# Patient Record
Sex: FEMALE | Race: WHITE | NOT HISPANIC OR LATINO | Employment: OTHER | URBAN - METROPOLITAN AREA
[De-identification: names, ages, dates, MRNs, and addresses within clinical notes are randomized per-mention and may not be internally consistent; named-entity substitution may affect disease eponyms.]

---

## 2017-01-23 DIAGNOSIS — F41.9 ANXIETY: ICD-10-CM

## 2017-01-23 RX ORDER — ALPRAZOLAM 0.5 MG/1
0.5 TABLET ORAL 2 TIMES DAILY
Qty: 60 TABLET | Refills: 0 | Status: SHIPPED | OUTPATIENT
Start: 2017-01-23 | End: 2017-07-05 | Stop reason: SDUPTHER

## 2017-03-11 ENCOUNTER — HOSPITAL ENCOUNTER (EMERGENCY)
Facility: HOSPITAL | Age: 62
Discharge: HOME | End: 2017-03-11
Attending: EMERGENCY MEDICINE | Admitting: EMERGENCY MEDICINE

## 2017-03-11 VITALS
TEMPERATURE: 98.3 F | OXYGEN SATURATION: 99 % | BODY MASS INDEX: 24.45 KG/M2 | SYSTOLIC BLOOD PRESSURE: 130 MMHG | RESPIRATION RATE: 17 BRPM | HEART RATE: 80 BPM | HEIGHT: 63 IN | WEIGHT: 138 LBS | DIASTOLIC BLOOD PRESSURE: 71 MMHG

## 2017-03-11 DIAGNOSIS — M79.604 PAIN IN BOTH LOWER EXTREMITIES: Primary | ICD-10-CM

## 2017-03-11 DIAGNOSIS — M79.605 PAIN IN BOTH LOWER EXTREMITIES: Primary | ICD-10-CM

## 2017-03-11 LAB
ALBUMIN SERPL-MCNC: 4.1 G/DL (ref 3.5–5.2)
ALBUMIN/GLOB SERPL: 1.5 G/DL
ALP SERPL-CCNC: 46 U/L (ref 39–117)
ALT SERPL W P-5'-P-CCNC: 11 U/L (ref 1–33)
ANION GAP SERPL CALCULATED.3IONS-SCNC: 14.4 MMOL/L
AST SERPL-CCNC: 19 U/L (ref 1–32)
BACTERIA UR QL AUTO: ABNORMAL /HPF
BASOPHILS # BLD AUTO: 0.02 10*3/MM3 (ref 0–0.2)
BASOPHILS NFR BLD AUTO: 0.5 % (ref 0–1.5)
BILIRUB SERPL-MCNC: 0.2 MG/DL (ref 0.1–1.2)
BILIRUB UR QL STRIP: NEGATIVE
BUN BLD-MCNC: 15 MG/DL (ref 6–20)
BUN/CREAT SERPL: 17.9 (ref 7–25)
CALCIUM SPEC-SCNC: 8.9 MG/DL (ref 8.6–10.5)
CHLORIDE SERPL-SCNC: 100 MMOL/L (ref 98–107)
CK SERPL-CCNC: 46 U/L (ref 20–180)
CLARITY UR: CLEAR
CO2 SERPL-SCNC: 25.6 MMOL/L (ref 22–29)
COLOR UR: YELLOW
CREAT BLD-MCNC: 0.84 MG/DL (ref 0.57–1)
CRP SERPL-MCNC: 0.15 MG/DL (ref 0–0.5)
DEPRECATED RDW RBC AUTO: 43.5 FL (ref 37–54)
EOSINOPHIL # BLD AUTO: 0.06 10*3/MM3 (ref 0–0.7)
EOSINOPHIL NFR BLD AUTO: 1.5 % (ref 0.3–6.2)
ERYTHROCYTE [DISTWIDTH] IN BLOOD BY AUTOMATED COUNT: 13.8 % (ref 11.7–13)
ERYTHROCYTE [SEDIMENTATION RATE] IN BLOOD: 7 MM/HR (ref 0–30)
FLUAV AG NPH QL: NEGATIVE
FLUBV AG NPH QL IA: NEGATIVE
GFR SERPL CREATININE-BSD FRML MDRD: 69 ML/MIN/1.73
GLOBULIN UR ELPH-MCNC: 2.7 GM/DL
GLUCOSE BLD-MCNC: 100 MG/DL (ref 65–99)
GLUCOSE UR STRIP-MCNC: NEGATIVE MG/DL
HCT VFR BLD AUTO: 40.4 % (ref 35.6–45.5)
HGB BLD-MCNC: 13.1 G/DL (ref 11.9–15.5)
HGB UR QL STRIP.AUTO: ABNORMAL
HOLD SPECIMEN: NORMAL
HYALINE CASTS UR QL AUTO: ABNORMAL /LPF
IMM GRANULOCYTES # BLD: 0 10*3/MM3 (ref 0–0.03)
IMM GRANULOCYTES NFR BLD: 0 % (ref 0–0.5)
KETONES UR QL STRIP: ABNORMAL
LEUKOCYTE ESTERASE UR QL STRIP.AUTO: NEGATIVE
LYMPHOCYTES # BLD AUTO: 1.36 10*3/MM3 (ref 0.9–4.8)
LYMPHOCYTES NFR BLD AUTO: 33 % (ref 19.6–45.3)
MAGNESIUM SERPL-MCNC: 2.1 MG/DL (ref 1.6–2.6)
MCH RBC QN AUTO: 27.8 PG (ref 26.9–32)
MCHC RBC AUTO-ENTMCNC: 32.4 G/DL (ref 32.4–36.3)
MCV RBC AUTO: 85.6 FL (ref 80.5–98.2)
MONOCYTES # BLD AUTO: 0.54 10*3/MM3 (ref 0.2–1.2)
MONOCYTES NFR BLD AUTO: 13.1 % (ref 5–12)
NEUTROPHILS # BLD AUTO: 2.14 10*3/MM3 (ref 1.9–8.1)
NEUTROPHILS NFR BLD AUTO: 51.9 % (ref 42.7–76)
NITRITE UR QL STRIP: NEGATIVE
PH UR STRIP.AUTO: 6 [PH] (ref 5–8)
PLATELET # BLD AUTO: 247 10*3/MM3 (ref 140–500)
PMV BLD AUTO: 10.6 FL (ref 6–12)
POTASSIUM BLD-SCNC: 3.5 MMOL/L (ref 3.5–5.2)
PROT SERPL-MCNC: 6.8 G/DL (ref 6–8.5)
PROT UR QL STRIP: NEGATIVE
RBC # BLD AUTO: 4.72 10*6/MM3 (ref 3.9–5.2)
RBC # UR: ABNORMAL /HPF
REF LAB TEST METHOD: ABNORMAL
SODIUM BLD-SCNC: 140 MMOL/L (ref 136–145)
SP GR UR STRIP: 1.02 (ref 1–1.03)
SQUAMOUS #/AREA URNS HPF: ABNORMAL /HPF
UROBILINOGEN UR QL STRIP: ABNORMAL
WBC NRBC COR # BLD: 4.12 10*3/MM3 (ref 4.5–10.7)
WBC UR QL AUTO: ABNORMAL /HPF
WHOLE BLOOD HOLD SPECIMEN: NORMAL

## 2017-03-11 PROCEDURE — 96361 HYDRATE IV INFUSION ADD-ON: CPT

## 2017-03-11 PROCEDURE — 82550 ASSAY OF CK (CPK): CPT | Performed by: PHYSICIAN ASSISTANT

## 2017-03-11 PROCEDURE — 36415 COLL VENOUS BLD VENIPUNCTURE: CPT | Performed by: EMERGENCY MEDICINE

## 2017-03-11 PROCEDURE — 83735 ASSAY OF MAGNESIUM: CPT | Performed by: PHYSICIAN ASSISTANT

## 2017-03-11 PROCEDURE — 25010000002 MORPHINE PER 10 MG: Performed by: EMERGENCY MEDICINE

## 2017-03-11 PROCEDURE — 96374 THER/PROPH/DIAG INJ IV PUSH: CPT

## 2017-03-11 PROCEDURE — 80053 COMPREHEN METABOLIC PANEL: CPT | Performed by: EMERGENCY MEDICINE

## 2017-03-11 PROCEDURE — 81001 URINALYSIS AUTO W/SCOPE: CPT | Performed by: PHYSICIAN ASSISTANT

## 2017-03-11 PROCEDURE — 86140 C-REACTIVE PROTEIN: CPT | Performed by: PHYSICIAN ASSISTANT

## 2017-03-11 PROCEDURE — 85025 COMPLETE CBC W/AUTO DIFF WBC: CPT | Performed by: EMERGENCY MEDICINE

## 2017-03-11 PROCEDURE — 87804 INFLUENZA ASSAY W/OPTIC: CPT | Performed by: PHYSICIAN ASSISTANT

## 2017-03-11 PROCEDURE — 85652 RBC SED RATE AUTOMATED: CPT | Performed by: PHYSICIAN ASSISTANT

## 2017-03-11 PROCEDURE — 25010000002 ONDANSETRON PER 1 MG: Performed by: PHYSICIAN ASSISTANT

## 2017-03-11 PROCEDURE — 99283 EMERGENCY DEPT VISIT LOW MDM: CPT

## 2017-03-11 PROCEDURE — 96375 TX/PRO/DX INJ NEW DRUG ADDON: CPT

## 2017-03-11 RX ORDER — ONDANSETRON 4 MG/1
4 TABLET, ORALLY DISINTEGRATING ORAL EVERY 8 HOURS PRN
Qty: 12 TABLET | Refills: 0 | Status: SHIPPED | OUTPATIENT
Start: 2017-03-11 | End: 2017-08-25

## 2017-03-11 RX ORDER — SODIUM CHLORIDE 0.9 % (FLUSH) 0.9 %
10 SYRINGE (ML) INJECTION AS NEEDED
Status: DISCONTINUED | OUTPATIENT
Start: 2017-03-11 | End: 2017-03-12 | Stop reason: HOSPADM

## 2017-03-11 RX ORDER — GABAPENTIN 300 MG/1
300 CAPSULE ORAL 3 TIMES DAILY
Qty: 45 CAPSULE | Refills: 0 | Status: SHIPPED | OUTPATIENT
Start: 2017-03-11 | End: 2017-08-25

## 2017-03-11 RX ORDER — ONDANSETRON 2 MG/ML
4 INJECTION INTRAMUSCULAR; INTRAVENOUS ONCE
Status: COMPLETED | OUTPATIENT
Start: 2017-03-11 | End: 2017-03-11

## 2017-03-11 RX ORDER — OXYCODONE HYDROCHLORIDE AND ACETAMINOPHEN 5; 325 MG/1; MG/1
1 TABLET ORAL ONCE
Status: COMPLETED | OUTPATIENT
Start: 2017-03-11 | End: 2017-03-11

## 2017-03-11 RX ADMIN — MORPHINE SULFATE 4 MG: 4 INJECTION, SOLUTION INTRAMUSCULAR; INTRAVENOUS at 18:50

## 2017-03-11 RX ADMIN — SODIUM CHLORIDE 500 ML: 9 INJECTION, SOLUTION INTRAVENOUS at 18:53

## 2017-03-11 RX ADMIN — ONDANSETRON 4 MG: 2 INJECTION INTRAMUSCULAR; INTRAVENOUS at 18:48

## 2017-03-11 RX ADMIN — OXYCODONE HYDROCHLORIDE AND ACETAMINOPHEN 1 TABLET: 5; 325 TABLET ORAL at 21:41

## 2017-03-11 NOTE — ED PROVIDER NOTES
EMERGENCY DEPARTMENT ENCOUNTER    CHIEF COMPLAINT  Chief Complaint: Bilateral leg pain from the knees down  History given by: Patient  History limited by: Nothing  Room Number: 01/01  PMD: Pat Novak MD      HPI:  Pt is a 61 y.o. female who presents complaining of bilateral leg pain from the knees down onset yesterday. The pt states this pain feels different than her chronic back pain. The pt describes the pain as an ache in the joints. The pt states that nothing makes the pain better or worse. Pt reports diarrhea, nausea, and vomiting onset 3 days ago. The pt states the diarrhea, nausea, and vomiting has resolved but the leg pain started after the diarrhea, nausea, and vomiting. Pt denies fever, HA, cough, and sore throat.    Duration: Since yesterday  Onset: Gradaul  Timing: Constant  Location: Bilateral legs from the knees down  Radiation: None  Quality: Aching pain  Intensity/Severity: Moderate  Progression: Unchanged  Associated Symptoms: Diarrhea, nausea, and vomiting onset 3 days ago that have now resolved  Aggravating Factors: Nothing  Alleviating Factors: Nothing  Previous Episodes: None  Treatment before arrival: Nothing    PAST MEDICAL HISTORY  Active Ambulatory Problems     Diagnosis Date Noted   • Pain in extremity 02/10/2016   • Hypokalemia 02/10/2016   • Gastroparesis 02/10/2016   • Dizziness 02/10/2016   • Anxiety 02/10/2016   • Degeneration of lumbar intervertebral disc 07/29/2016   • Annular tear of lumbar disc 07/29/2016     Resolved Ambulatory Problems     Diagnosis Date Noted   • No Resolved Ambulatory Problems     Past Medical History   Diagnosis Date   • Bladder incontinence    • Spinal headache        PAST SURGICAL HISTORY  Past Surgical History   Procedure Laterality Date   • Hysterectomy     • Breast biopsy     • Oophorectomy     • Colonoscopy  2015 JAN   • Bladder sling modified, anterior and posterior vaginal repair  2007,2011   • Laparoscopic cholecystectomy  2009   •  Endoscopy  2015   • Colonoscopy N/A 5/6/2016     Procedure: COLONOSCOPY with cold polypectomies;  Surgeon: Gustavo Guerrero MD;  Location: [facility] ENDOSCOPY;  Service:        FAMILY HISTORY  Family History   Problem Relation Age of Onset   • Diabetes Mother    • Hypertension Mother    • COPD Mother    • COPD Father        SOCIAL HISTORY  Social History     Social History   • Marital status:      Spouse name: N/A   • Number of children: 2   • Years of education: N/A     Occupational History   • Not on file.     Social History Main Topics   • Smoking status: Former Smoker     Quit date: 4/6/2011   • Smokeless tobacco: Never Used   • Alcohol use No   • Drug use: No   • Sexual activity: Not on file     Other Topics Concern   • Not on file     Social History Narrative       ALLERGIES  Codeine and Prednisone    REVIEW OF SYSTEMS  Review of Systems   Constitutional: Negative for chills and fever.   HENT: Negative for sore throat and trouble swallowing.    Eyes: Negative for visual disturbance.   Respiratory: Negative for cough and shortness of breath.    Cardiovascular: Negative for chest pain, palpitations and leg swelling.   Gastrointestinal: Positive for diarrhea (Onset 3 days ago with symptoms being resolved), nausea (Onset 3 days ago with symptoms being resolved) and vomiting (Onset 3 days ago with symptoms being resolved). Negative for abdominal pain.   Endocrine: Negative.    Genitourinary: Negative for decreased urine volume, dysuria and frequency.   Musculoskeletal: Negative for neck pain.        Bilateral leg pain from the knee down   Skin: Negative for rash.   Allergic/Immunologic: Negative.    Neurological: Negative for syncope, weakness, numbness and headaches.   Hematological: Negative.    Psychiatric/Behavioral: Negative.    All other systems reviewed and are negative.      PHYSICAL EXAM  ED Triage Vitals   Temp Heart Rate Resp BP SpO2   03/11/17 1641 03/11/17 1641 03/11/17 1641 03/11/17 1645 03/11/17 1641    97.6 °F (36.4 °C) 106 18 146/80 99 %      Temp src Heart Rate Source Patient Position BP Location FiO2 (%)   03/11/17 1641 -- 03/11/17 1645 03/11/17 1645 --   Tympanic  Sitting Right arm        Physical Exam   Constitutional: She is oriented to person, place, and time and well-developed, well-nourished, and in no distress.   HENT:   Head: Normocephalic and atraumatic.   Mouth/Throat: Oropharynx is clear and moist. No oropharyngeal exudate or posterior oropharyngeal erythema.   Eyes: EOM are normal. Pupils are equal, round, and reactive to light.   Neck: Normal range of motion.   Cardiovascular: Normal rate, regular rhythm and normal heart sounds.    No murmur heard.  Pulses:       Posterior tibial pulses are 2+ on the right side, and 2+ on the left side.   Pulmonary/Chest: Effort normal and breath sounds normal. No respiratory distress.   Abdominal: Soft. There is no tenderness.   Femoral pulses equal bilaterally.   Musculoskeletal: Normal range of motion. She exhibits no edema or tenderness.   The pt has no joint tenderness, warmth or erythema. The pt has no calf tenderness.  Good pedal pulses bilaterally.   Lymphadenopathy:     She has no cervical adenopathy.   Neurological: She is alert and oriented to person, place, and time. She has normal sensation and normal strength.   Skin: Skin is warm and dry. No rash noted.   Psychiatric: Affect normal.   Nursing note and vitals reviewed.      LAB RESULTS  Lab Results (last 24 hours)     Procedure Component Value Units Date/Time    CBC & Differential [[id number]] Collected:  03/11/17 1702    Specimen:  Blood Updated:  03/11/17 1716    Narrative:       The following orders were created for panel order CBC & Differential.  Procedure                               Abnormality         Status                     ---------                               -----------         ------                     CBC Auto Differential[[id number]]         Abnormal            Final result                  Please view results for these tests on the individual orders.    Comprehensive Metabolic Panel [[id number]]  (Abnormal) Collected:  03/11/17 1702    Specimen:  Blood Updated:  03/11/17 1741     Glucose 100 (H) mg/dL      BUN 15 mg/dL      Creatinine 0.84 mg/dL      Sodium 140 mmol/L      Potassium 3.5 mmol/L      Chloride 100 mmol/L      CO2 25.6 mmol/L      Calcium 8.9 mg/dL      Total Protein 6.8 g/dL      Albumin 4.10 g/dL      ALT (SGPT) 11 U/L      AST (SGOT) 19 U/L      Alkaline Phosphatase 46 U/L      Total Bilirubin 0.2 mg/dL      eGFR Non African Amer 69 mL/min/1.73      Globulin 2.7 gm/dL      A/G Ratio 1.5 g/dL      BUN/Creatinine Ratio 17.9      Anion Gap 14.4 mmol/L     CBC Auto Differential [[id number]]  (Abnormal) Collected:  03/11/17 1702    Specimen:  Blood Updated:  03/11/17 1716     WBC 4.12 (L) 10*3/mm3      RBC 4.72 10*6/mm3      Hemoglobin 13.1 g/dL      Hematocrit 40.4 %      MCV 85.6 fL      MCH 27.8 pg      MCHC 32.4 g/dL      RDW 13.8 (H) %      RDW-SD 43.5 fl      MPV 10.6 fL      Platelets 247 10*3/mm3      Neutrophil % 51.9 %      Lymphocyte % 33.0 %      Monocyte % 13.1 (H) %      Eosinophil % 1.5 %      Basophil % 0.5 %      Immature Grans % 0.0 %      Neutrophils, Absolute 2.14 10*3/mm3      Lymphocytes, Absolute 1.36 10*3/mm3      Monocytes, Absolute 0.54 10*3/mm3      Eosinophils, Absolute 0.06 10*3/mm3      Basophils, Absolute 0.02 10*3/mm3      Immature Grans, Absolute 0.00 10*3/mm3     CK [[id number]]  (Normal) Collected:  03/11/17 1702    Specimen:  Blood Updated:  03/11/17 1849     Creatine Kinase 46 U/L     C-reactive Protein [[id number]]  (Normal) Collected:  03/11/17 1702    Specimen:  Blood Updated:  03/11/17 1849     C-Reactive Protein 0.15 mg/dL     Sedimentation Rate [[id number]]  (Normal) Collected:  03/11/17 1702    Specimen:  Blood Updated:  03/11/17 1905     Sed Rate 7 mm/hr     Magnesium [[id number]]  (Normal) Collected:  03/11/17 1702    Specimen:  Blood Updated:   03/11/17 1849     Magnesium 2.1 mg/dL     Influenza Antigen [[id number]]  (Normal) Collected:  03/11/17 1847    Specimen:  Swab from Nasopharynx Updated:  03/11/17 1923     Influenza A Ag, EIA Negative      Influenza B Ag, EIA Negative     Urinalysis With / Culture If Indicated [[id number]]  (Abnormal) Collected:  03/11/17 2028    Specimen:  Urine from Urine, Clean Catch Updated:  03/11/17 2051     Color, UA Yellow      Appearance, UA Clear      pH, UA 6.0      Specific Gravity, UA 1.016      Glucose, UA Negative      Ketones, UA 40 mg/dL (2+) (A)      Bilirubin, UA Negative      Blood, UA Small (1+) (A)      Protein, UA Negative      Leuk Esterase, UA Negative      Nitrite, UA Negative      Urobilinogen, UA 1.0 E.U./dL     Urinalysis, Microscopic Only [[id number]]  (Abnormal) Collected:  03/11/17 2028    Specimen:  Urine from Urine, Clean Catch Updated:  03/11/17 2051     RBC, UA 3-5 (A) /HPF      WBC, UA 0-2 /HPF      Bacteria, UA None Seen /HPF      Squamous Epithelial Cells, UA 0-2 /HPF      Hyaline Casts, UA 3-6 /LPF      Methodology Automated Microscopy           I ordered the above labs and reviewed the results    RADIOLOGY  No orders to display        I ordered the above noted radiological studies. Interpreted by radiologist. Reviewed by me in PACS.       PROCEDURES  Procedures      PROGRESS AND CONSULTS  ED Course     1645  Labs ordered for further evaluation.    1832  Labs ordered for further evaluation. IVFs, Zofran, and Morphine ordered.    2121  BP- 157/74 HR- 83 Temp- 97.6 °F (36.4 °C) (Tympanic) O2 sat- 97%    Rechecked pt, her pain has not improved after receiving the Morphine. Discussed the negative lab results with the pt. Discussed the possible causes of the pt's leg pain. Discussed the plan to discharge the pt with the plan to get an out patient US of her LE's. The pt understands and agrees with the plan.    2124  Percocet ordered.      MEDICAL DECISION MAKING  Results were reviewed/discussed with the  patient and they were also made aware of online access. Pt also made aware that some labs, such as cultures, will not be resulted during ER visit and follow up with PMD is necessary.     MDM  Number of Diagnoses or Management Options     Amount and/or Complexity of Data Reviewed  Clinical lab tests: ordered and reviewed  Review and summarize past medical records: yes (The pt was seen by Dr. Nix on 11-21-16 and was diagnosed with degeneration of the lumbar intervertebral disc.)    Patient Progress  Patient progress: stable         DIAGNOSIS  Final diagnoses:   Pain in both lower extremities       DISPOSITION  DISCHARGE    Patient discharged in stable condition.    Reviewed implications of results, diagnosis, meds, responsibility to follow up, warning signs and symptoms of possible worsening, potential complications and reasons to return to ER.    Patient/Family voiced understanding of above instructions.    Discussed plan for discharge, as there is no emergent indication for admission.  Pt/family is agreeable and understands need for follow up and repeat testing.  Pt is aware that discharge does not mean that nothing is wrong but it indicates no emergency is present that requires admission and they must continue care with follow-up as given below or physician of their choice.     FOLLOW-UP  Pat Novak MD  [address]  [place] [address]  [phone]    Schedule an appointment as soon as possible for a visit in 3 days           Medication List      New Prescriptions          gabapentin 300 MG capsule   Commonly known as:  NEURONTIN   Take 1 capsule by mouth 3 (Three) Times a Day.       ondansetron ODT 4 MG disintegrating tablet   Commonly known as:  ZOFRAN ODT   Take 1 tablet by mouth Every 8 (Eight) Hours As Needed for Nausea.               Latest Documented Vital Signs:  As of 10:06 PM  BP- 130/71 HR- 80 Temp- 98.3 °F (36.8 °C) (Oral) O2 sat- 99%    --  Documentation assistance  provided by ken Benavides for Virgilio Cordova.  Information recorded by the ken was done at my direction and has been verified and validated by me.     Joshua Benavides  03/11/17 2128       HENRY Nuñez  03/11/17 2134       HENRY Nuñez  03/11/17 2206

## 2017-03-11 NOTE — ED NOTES
Pt. reports BLE pain in both calf areas. Does not hurt with dorsiflexion.     Antonio Gleason RN  03/11/17 1112

## 2017-03-12 NOTE — ED NOTES
Discussed discharge instructions with patient, patient verbalized understanding, pt left with NAD.     Diana Cobian RN  03/11/17 6581

## 2017-03-12 NOTE — DISCHARGE INSTRUCTIONS
Home, rest, medicine as directed, follow up with PCP for recheck and further evaluation with outpatient arterial doppler.

## 2017-03-12 NOTE — ED PROVIDER NOTES
Pt is a 61 y.o. female who presents c/o bilateral lower leg pain, onset yesterday, with no known injury or trauma. Pt states there is nothing she can do to exacerbate or alleviate her pain. Pt states she has recently been ill with vomiting and diarrhea. Pt denies any vomiting or diarrhea today. Pt states she was seen by her chiropractor this morning but he did not feel that her sx were related to her back.     PE:  Pt appears uncomfortable  Vitals stable- /101  Heart RRR, lungs clear  2+ pedal pulses bilaterally with cap refill 3-4 sec  Abdomen soft, non tender  Lower extremities appear modeled in thigh but legs are warm to touch      Discussed with Pt nothing acute seen in labs. Plan to discharge for follow up doppler.     I supervised care provided by the midlevel provider Virgilio Cordova PA-C.    We have discussed this patient's history, physical exam, and treatment plan.   I have reviewed the note and personally saw and examined the patient and agree with the plan of care.    Documentation assistance provided by ken Simmons for Dr. Paulo MD.  Information recorded by the scribe was done at my direction and has been verified and validated by me.         Arielle Simmons  03/11/17 2002       Arnold Bates MD  03/11/17 6942

## 2017-03-13 ENCOUNTER — TELEPHONE (OUTPATIENT)
Dept: SOCIAL WORK | Facility: HOSPITAL | Age: 62
End: 2017-03-13

## 2017-03-14 ENCOUNTER — OFFICE VISIT (OUTPATIENT)
Dept: FAMILY MEDICINE CLINIC | Facility: CLINIC | Age: 62
End: 2017-03-14

## 2017-03-14 VITALS
SYSTOLIC BLOOD PRESSURE: 122 MMHG | WEIGHT: 136.5 LBS | HEART RATE: 83 BPM | HEIGHT: 63 IN | BODY MASS INDEX: 24.19 KG/M2 | OXYGEN SATURATION: 99 % | DIASTOLIC BLOOD PRESSURE: 80 MMHG | TEMPERATURE: 98.3 F

## 2017-03-14 DIAGNOSIS — M79.604 PAIN IN BOTH LOWER EXTREMITIES: Primary | ICD-10-CM

## 2017-03-14 DIAGNOSIS — M79.605 PAIN IN BOTH LOWER EXTREMITIES: Primary | ICD-10-CM

## 2017-03-14 PROCEDURE — 99212 OFFICE O/P EST SF 10 MIN: CPT | Performed by: FAMILY MEDICINE

## 2017-03-14 NOTE — PROGRESS NOTES
Subjective   Ritu Hanson is a 61 y.o. female. CC: leg pain    History of Present Illness   Ritu is a 61 year old female who comes in today because of pain in her legs.  This started on Saturday.  The pain was so severe that she went to the ER.  Evaluation was negative.    The pain is much better at this time.  She was able to work today.  No other symptoms besides the pain.  Blood pressure is good.        The following portions of the patient's history were reviewed and updated as appropriate: allergies, current medications, past medical history, past social history, past surgical history and problem list.    Review of Systems   Constitutional: Negative.  Negative for fatigue and unexpected weight change.   HENT: Negative.  Negative for congestion.    Respiratory: Negative.  Negative for cough, shortness of breath and wheezing.    Cardiovascular: Negative.  Negative for chest pain, palpitations and leg swelling.   Gastrointestinal: Negative.  Negative for abdominal pain, blood in stool, constipation, diarrhea, nausea and vomiting.   Genitourinary: Negative.  Negative for difficulty urinating, dysuria, frequency and hematuria.   Musculoskeletal: Negative.         ;pain in both lower legs   Skin: Negative.  Negative for rash.   Neurological: Negative.  Negative for dizziness, light-headedness, numbness and headaches.   Psychiatric/Behavioral: Negative for dysphoric mood and sleep disturbance. The patient is nervous/anxious.        Objective   Physical Exam   Constitutional: She is oriented to person, place, and time. She appears well-developed and well-nourished.   Cardiovascular: Normal rate, regular rhythm, normal heart sounds and intact distal pulses.    No murmur heard.  Pulmonary/Chest: Effort normal and breath sounds normal. No respiratory distress.   Musculoskeletal: She exhibits no edema.   Neurological: She is alert and oriented to person, place, and time.   Skin: Skin is warm and dry. No rash noted.    Psychiatric: She has a normal mood and affect. Her behavior is normal.   Nursing note and vitals reviewed.    Vitals:    03/14/17 1429   BP: 122/80   Pulse: 83   Temp: 98.3 °F (36.8 °C)   SpO2: 99%     Assessment/Plan   Problems Addressed this Visit     None      Visit Diagnoses     Pain in both lower extremities    -  Primary        No further evaluation at this time since the pain is better.  She is to let me know if she has recurrent problems.

## 2017-06-06 ENCOUNTER — TRANSCRIBE ORDERS (OUTPATIENT)
Dept: ADMINISTRATIVE | Facility: HOSPITAL | Age: 62
End: 2017-06-06

## 2017-06-06 DIAGNOSIS — Z12.31 VISIT FOR SCREENING MAMMOGRAM: Primary | ICD-10-CM

## 2017-06-12 ENCOUNTER — HOSPITAL ENCOUNTER (OUTPATIENT)
Dept: MAMMOGRAPHY | Facility: HOSPITAL | Age: 62
Discharge: HOME | End: 2017-06-12
Attending: OBSTETRICS & GYNECOLOGY | Admitting: OBSTETRICS & GYNECOLOGY

## 2017-06-12 DIAGNOSIS — Z12.31 VISIT FOR SCREENING MAMMOGRAM: ICD-10-CM

## 2017-06-12 PROCEDURE — 77063 BREAST TOMOSYNTHESIS BI: CPT

## 2017-06-12 PROCEDURE — G0202 SCR MAMMO BI INCL CAD: HCPCS

## 2017-07-05 DIAGNOSIS — F41.9 ANXIETY: ICD-10-CM

## 2017-07-05 RX ORDER — ALPRAZOLAM 0.5 MG/1
TABLET ORAL
Qty: 60 TABLET | Refills: 0 | OUTPATIENT
Start: 2017-07-05 | End: 2017-09-08 | Stop reason: SDUPTHER

## 2017-07-18 RX ORDER — PANTOPRAZOLE SODIUM 40 MG/1
40 TABLET, DELAYED RELEASE ORAL DAILY
Qty: 90 TABLET | Refills: 3 | OUTPATIENT
Start: 2017-07-18

## 2017-07-19 RX ORDER — PANTOPRAZOLE SODIUM 40 MG/1
40 TABLET, DELAYED RELEASE ORAL DAILY
Qty: 90 TABLET | Refills: 0 | Status: SHIPPED | OUTPATIENT
Start: 2017-07-19 | End: 2017-11-02 | Stop reason: SDUPTHER

## 2017-07-19 NOTE — TELEPHONE ENCOUNTER
----- Message from Aidan Dinero sent at 7/19/2017  1:29 PM EDT -----  Regarding: [facility]  Contact: [phone]  PT CALLED WAITING ON PENDING MEDICATION, PT HAS A FOLLOW UP WITH  ON SEP 13TH.

## 2017-07-19 NOTE — TELEPHONE ENCOUNTER
Pantoprazole 40 mg 1 tab po daily in the morning, #90, R0 called to [facility] @ [phone].    Call to pt to advise of above.  Verb understanding.

## 2017-08-24 ENCOUNTER — TELEPHONE (OUTPATIENT)
Dept: CARDIOLOGY | Facility: CLINIC | Age: 62
End: 2017-08-24

## 2017-08-24 NOTE — TELEPHONE ENCOUNTER
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 She has noticed a fluttering sensation in her chest for the last week. A co-worker did an EKG on her today and stated that the EKG was abnormal; the read out stated NSR with PACs. Her HR was 71.     She would like to be evaluated by a cardiologist. I scheduled her to see Dr. Her tomorrow 8/25 at 1020. She will bring the abnormal EKG with her.    Keke OLIVARES RN

## 2017-08-25 ENCOUNTER — HOSPITAL ENCOUNTER (OUTPATIENT)
Dept: CARDIOLOGY | Facility: HOSPITAL | Age: 62
Discharge: HOME | End: 2017-08-25
Attending: INTERNAL MEDICINE | Admitting: INTERNAL MEDICINE

## 2017-08-25 ENCOUNTER — OFFICE VISIT (OUTPATIENT)
Dept: CARDIOLOGY | Facility: CLINIC | Age: 62
End: 2017-08-25

## 2017-08-25 VITALS
BODY MASS INDEX: 23.57 KG/M2 | DIASTOLIC BLOOD PRESSURE: 100 MMHG | SYSTOLIC BLOOD PRESSURE: 160 MMHG | WEIGHT: 133 LBS | HEIGHT: 63 IN | HEART RATE: 93 BPM

## 2017-08-25 DIAGNOSIS — R00.2 PALPITATIONS: ICD-10-CM

## 2017-08-25 DIAGNOSIS — R00.2 PALPITATIONS: Primary | ICD-10-CM

## 2017-08-25 LAB
BH CV STRESS BP STAGE 1: NORMAL
BH CV STRESS BP STAGE 2: NORMAL
BH CV STRESS DURATION MIN STAGE 1: 3
BH CV STRESS DURATION MIN STAGE 2: 3
BH CV STRESS DURATION SEC STAGE 1: 0
BH CV STRESS DURATION SEC STAGE 2: 0
BH CV STRESS GRADE STAGE 1: 10
BH CV STRESS GRADE STAGE 2: 12
BH CV STRESS HR STAGE 1: 132
BH CV STRESS HR STAGE 2: 156
BH CV STRESS METS STAGE 1: 5
BH CV STRESS METS STAGE 2: 7.5
BH CV STRESS PROTOCOL 1: NORMAL
BH CV STRESS RECOVERY BP: NORMAL MMHG
BH CV STRESS RECOVERY HR: 94 BPM
BH CV STRESS SPEED STAGE 1: 1.7
BH CV STRESS SPEED STAGE 2: 2.5
BH CV STRESS STAGE 1: 1
BH CV STRESS STAGE 2: 2
MAXIMAL PREDICTED HEART RATE: 160 BPM
PERCENT MAX PREDICTED HR: 97.5 %
STRESS BASELINE BP: NORMAL MMHG
STRESS BASELINE HR: 93 BPM
STRESS PERCENT HR: 115 %
STRESS POST ESTIMATED WORKLOAD: 7.5 METS
STRESS POST EXERCISE DUR MIN: 6 MIN
STRESS POST EXERCISE DUR SEC: 0 SEC
STRESS POST PEAK BP: NORMAL MMHG
STRESS POST PEAK HR: 156 BPM
STRESS TARGET HR: 136 BPM

## 2017-08-25 PROCEDURE — 93017 CV STRESS TEST TRACING ONLY: CPT

## 2017-08-25 PROCEDURE — 93000 ELECTROCARDIOGRAM COMPLETE: CPT | Performed by: INTERNAL MEDICINE

## 2017-08-25 PROCEDURE — 93018 CV STRESS TEST I&R ONLY: CPT | Performed by: INTERNAL MEDICINE

## 2017-08-25 PROCEDURE — 99204 OFFICE O/P NEW MOD 45 MIN: CPT | Performed by: INTERNAL MEDICINE

## 2017-08-25 PROCEDURE — 93016 CV STRESS TEST SUPVJ ONLY: CPT | Performed by: INTERNAL MEDICINE

## 2017-08-25 NOTE — PROGRESS NOTES
Ritu Hanson  1955  Date of Office Visit: 08/25/2017  Encounter Provider: Colton Her MD  Place of Service: [facility] CARDIOLOGY      CHIEF COMPLAINT:  Palpitations  Left arm pain  Elevated blood pressure without diagnosis of hypertension    HISTORY OF PRESENT ILLNESS:   Dr. Novak,   I had the pleasure of seeing your patient Ritu Hanson in consultation today.  As you well know she is a very pleasant 62-year-old female with a medical history of previous tobacco abuse, gastroesophageal reflux disease, anxiety, who presents to me secondary to palpitations and left arm discomfort.  She states that over the past 2 weeks in duration she has noticed palpitations that come on typically daily.  They're moderate in intensity and last approximately 5 minutes in duration.  They're nonexertional and come on without any precipitating factor.  She does not have extensive caffeine or alcohol use.  She does state that she has underlying more stress than normal at work over the past couple weeks.  She denies any chest pain with exertion or increased palpitations with exertion.  She does report mild left arm discomfort that is dull and comes on at rest during the same period of time.  This also goes away within 5 minutes.  That this does not increase with movement or with activity.  She had an electrocardiogram done at her work at [employer] which was within normal limits.  Her EKG today shows a normal sinus rhythm with nonspecific ST-T wave abnormalities.  In addition, her blood pressure today is elevated, however she states that she is anxious.  When I look at her previous blood pressure measurements in your office they're within normal limits.    Review of Systems   Constitution: Negative for fever, weakness and malaise/fatigue.   HENT: Negative for nosebleeds and sore throat.    Eyes: Negative for blurred vision and double vision.   Cardiovascular: Negative for chest pain,  claudication, palpitations and syncope.   Respiratory: Negative for cough, shortness of breath and snoring.    Endocrine: Negative for cold intolerance, heat intolerance and polydipsia.   Skin: Negative for itching, poor wound healing and rash.   Musculoskeletal: Negative for joint pain, joint swelling, muscle weakness and myalgias.   Gastrointestinal: Negative for abdominal pain, melena, nausea and vomiting.   Neurological: Negative for light-headedness, loss of balance, seizures and vertigo.   Psychiatric/Behavioral: Negative for altered mental status and depression.          Past Medical History:   Diagnosis Date   • Anxiety    • Bladder incontinence    • Colon polyps    • Dizziness    • PAC (premature atrial contraction)    • Spinal headache     NO FAMILY HX   • Tachycardia        The following portions of the patient's history were reviewed and updated as appropriate: Social history , Family history and Surgical history     Current Outpatient Prescriptions on File Prior to Visit   Medication Sig Dispense Refill   • ALPRAZolam (XANAX) 0.5 MG tablet take 1 tablet by mouth twice a day 60 tablet 0   • estradiol (MINIVELLE, VIVELLE-DOT) 0.1 MG/24HR patch Place on the skin.     • ibuprofen (ADVIL,MOTRIN) 600 MG tablet Take 1 tablet by mouth every 8 (eight) hours as needed for mild pain (1-3). 30 tablet 0   • pantoprazole (PROTONIX) 40 MG EC tablet Take 1 tablet by mouth daily in the morning. 90 tablet 0   • [DISCONTINUED] cyclobenzaprine (FLEXERIL) 10 MG tablet Take 1 tablet by mouth 3 (three) times a day as needed for muscle spasms. 21 tablet 0   • [DISCONTINUED] gabapentin (NEURONTIN) 300 MG capsule Take 1 capsule by mouth 3 (Three) Times a Day. 45 capsule 0   • [DISCONTINUED] ondansetron ODT (ZOFRAN ODT) 4 MG disintegrating tablet Take 1 tablet by mouth Every 8 (Eight) Hours As Needed for Nausea. 12 tablet 0     No current facility-administered medications on file prior to visit.        Allergies   Allergen  "Reactions   • Codeine    • Prednisone        Vitals:    08/25/17 1025 08/25/17 1039   BP: (!) 162/102 160/100   BP Location: Left arm Left arm   Pulse: 93    Weight: 133 lb (60.3 kg)    Height: 63\" (160 cm)      Physical Exam   Constitutional: She is oriented to person, place, and time. She appears well-developed and well-nourished.   HENT:   Head: Normocephalic and atraumatic.   Eyes: Conjunctivae and EOM are normal. No scleral icterus.   Neck: Normal range of motion. Neck supple. Normal carotid pulses, no hepatojugular reflux and no JVD present. Carotid bruit is not present. No tracheal deviation present. No thyromegaly present.   Cardiovascular: Normal rate and regular rhythm.  Exam reveals no gallop and no friction rub.    No murmur heard.  Pulses:       Carotid pulses are 2+ on the right side, and 2+ on the left side.       Radial pulses are 2+ on the right side, and 2+ on the left side.        Femoral pulses are 2+ on the right side, and 2+ on the left side.       Dorsalis pedis pulses are 2+ on the right side, and 2+ on the left side.        Posterior tibial pulses are 2+ on the right side, and 2+ on the left side.   Pulmonary/Chest: Breath sounds normal. No respiratory distress. She has no decreased breath sounds. She has no wheezes. She has no rhonchi. She has no rales. She exhibits no tenderness.   Abdominal: Soft. Bowel sounds are normal. She exhibits no distension. There is no tenderness. There is no rebound.   Musculoskeletal: She exhibits no edema or deformity.   Neurological: She is alert and oriented to person, place, and time. She has normal strength. No sensory deficit.   Skin: No rash noted. No erythema.   Psychiatric: She has a normal mood and affect. Her behavior is normal.     No components found for: CBC  No results found for: CMP  No components found for: LIPID  No results found for: BMP      ECG 12 Lead  Date/Time: 8/25/2017 11:04 AM  Performed by: BREE MONTEIRO  Authorized by: " BREE MONTEIRO   Comparison: compared with previous ECG from 8/24/2017  Similar to previous ECG  Rhythm: sinus rhythm  Rate: normal  ST Segments: ST segments normal  T Waves: T waves normal  QRS axis: normal  Clinical impression: normal ECG          DISCUSSION/SUMMARY  62-year-old female with no significant cardiac history who presents to me for evaluation of palpitations and occasional left arm discomfort.  The palpitations and arm discomfort do not appear to be exertional, and I do not think they're representative of ischemia or angina.  Her palpitations do seem to be frequent and I think that we should establish what the etiology of the palpitations actually is.    1.  Palpitations:      -48 hour Holter monitor has been ordered    -Treadmill stress today.    I will discuss results with her after we have them back.

## 2017-08-29 ENCOUNTER — HOSPITAL ENCOUNTER (OUTPATIENT)
Dept: CARDIOLOGY | Facility: HOSPITAL | Age: 62
Discharge: HOME | End: 2017-08-29
Attending: INTERNAL MEDICINE | Admitting: INTERNAL MEDICINE

## 2017-08-29 VITALS
DIASTOLIC BLOOD PRESSURE: 86 MMHG | BODY MASS INDEX: 23.57 KG/M2 | WEIGHT: 133 LBS | HEIGHT: 63 IN | HEART RATE: 82 BPM | SYSTOLIC BLOOD PRESSURE: 122 MMHG

## 2017-08-29 DIAGNOSIS — R00.2 PALPITATIONS: ICD-10-CM

## 2017-08-29 LAB
BH CV ECHO MEAS - ACS: 2.1 CM
BH CV ECHO MEAS - AO MAX PG: 4.5 MMHG
BH CV ECHO MEAS - AO ROOT AREA (BSA CORRECTED): 1.8
BH CV ECHO MEAS - AO ROOT AREA: 6.9 CM^2
BH CV ECHO MEAS - AO ROOT DIAM: 3 CM
BH CV ECHO MEAS - AO V2 MAX: 106.4 CM/SEC
BH CV ECHO MEAS - BSA(HAYCOCK): 1.6 M^2
BH CV ECHO MEAS - BSA: 1.6 M^2
BH CV ECHO MEAS - BZI_BMI: 23.6 KILOGRAMS/M^2
BH CV ECHO MEAS - BZI_METRIC_HEIGHT: 160 CM
BH CV ECHO MEAS - BZI_METRIC_WEIGHT: 60.3 KG
BH CV ECHO MEAS - CONTRAST EF 4CH: 78 ML/M^2
BH CV ECHO MEAS - EDV(MOD-SP4): 59 ML
BH CV ECHO MEAS - EDV(TEICH): 86.7 ML
BH CV ECHO MEAS - EF(CUBED): 71.5 %
BH CV ECHO MEAS - EF(MOD-SP4): 63 %
BH CV ECHO MEAS - EF(TEICH): 63.4 %
BH CV ECHO MEAS - ESV(MOD-SP4): 13 ML
BH CV ECHO MEAS - ESV(TEICH): 31.7 ML
BH CV ECHO MEAS - FS: 34.2 %
BH CV ECHO MEAS - IVS/LVPW: 1
BH CV ECHO MEAS - IVSD: 0.85 CM
BH CV ECHO MEAS - LAT PEAK E' VEL: 10 CM/SEC
BH CV ECHO MEAS - LV DIASTOLIC VOL/BSA (35-75): 36.3 ML/M^2
BH CV ECHO MEAS - LV MASS(C)D: 113.2 GRAMS
BH CV ECHO MEAS - LV MASS(C)DI: 69.6 GRAMS/M^2
BH CV ECHO MEAS - LV SYSTOLIC VOL/BSA (12-30): 8 ML/M^2
BH CV ECHO MEAS - LVIDD: 4.4 CM
BH CV ECHO MEAS - LVIDS: 2.9 CM
BH CV ECHO MEAS - LVLD AP4: 7 CM
BH CV ECHO MEAS - LVLS AP4: 6.1 CM
BH CV ECHO MEAS - LVPWD: 0.81 CM
BH CV ECHO MEAS - MED PEAK E' VEL: 9 CM/SEC
BH CV ECHO MEAS - MR MAX PG: 37 MMHG
BH CV ECHO MEAS - MR MAX VEL: 304.1 CM/SEC
BH CV ECHO MEAS - MV A DUR: 0.11 SEC
BH CV ECHO MEAS - MV A MAX VEL: 66.9 CM/SEC
BH CV ECHO MEAS - MV DEC SLOPE: 357.6 CM/SEC^2
BH CV ECHO MEAS - MV DEC TIME: 0.21 SEC
BH CV ECHO MEAS - MV E MAX VEL: 74.2 CM/SEC
BH CV ECHO MEAS - MV E/A: 1.1
BH CV ECHO MEAS - MV P1/2T MAX VEL: 72.8 CM/SEC
BH CV ECHO MEAS - MV P1/2T: 59.6 MSEC
BH CV ECHO MEAS - MVA P1/2T LCG: 3 CM^2
BH CV ECHO MEAS - MVA(P1/2T): 3.7 CM^2
BH CV ECHO MEAS - PULM A REVS DUR: 0.11 SEC
BH CV ECHO MEAS - PULM A REVS VEL: 39.8 CM/SEC
BH CV ECHO MEAS - PULM DIAS VEL: 33.5 CM/SEC
BH CV ECHO MEAS - PULM S/D: 1.6
BH CV ECHO MEAS - PULM SYS VEL: 52.4 CM/SEC
BH CV ECHO MEAS - SI(CUBED): 36.9 ML/M^2
BH CV ECHO MEAS - SI(MOD-SP4): 28.3 ML/M^2
BH CV ECHO MEAS - SI(TEICH): 33.8 ML/M^2
BH CV ECHO MEAS - SV(CUBED): 60.1 ML
BH CV ECHO MEAS - SV(MOD-SP4): 46 ML
BH CV ECHO MEAS - SV(TEICH): 55 ML
BH CV ECHO MEAS - TAPSE (>1.6): 2.3 CM2
BH CV STRESS BP STAGE 1: NORMAL
BH CV STRESS BP STAGE 2: NORMAL
BH CV STRESS DURATION MIN STAGE 1: 3
BH CV STRESS DURATION MIN STAGE 2: 3
BH CV STRESS DURATION SEC STAGE 1: 0
BH CV STRESS DURATION SEC STAGE 2: 0
BH CV STRESS ECHO POST STRESS EJECTION FRACTION EF: 77 %
BH CV STRESS GRADE STAGE 1: 10
BH CV STRESS GRADE STAGE 2: 12
BH CV STRESS HR STAGE 1: 131
BH CV STRESS HR STAGE 2: 150
BH CV STRESS METS STAGE 1: 5
BH CV STRESS METS STAGE 2: 7.5
BH CV STRESS PROTOCOL 1: NORMAL
BH CV STRESS SPEED STAGE 1: 1.7
BH CV STRESS SPEED STAGE 2: 2.5
BH CV STRESS STAGE 1: 1
BH CV STRESS STAGE 2: 2
BH CV XLRA - RV BASE: 2.7 CM
BH CV XLRA - TDI S': 10 CM/SEC
E/E' RATIO: 7.5
LEFT ATRIUM VOLUME INDEX: 15 ML/M2
LV EF 2D ECHO EST: 63 %
MAXIMAL PREDICTED HEART RATE: 160 BPM
PERCENT MAX PREDICTED HR: 93.75 %
STRESS BASELINE BP: NORMAL MMHG
STRESS BASELINE HR: 82 BPM
STRESS PERCENT HR: 110 %
STRESS POST ESTIMATED WORKLOAD: 7 METS
STRESS POST EXERCISE DUR MIN: 6 MIN
STRESS POST EXERCISE DUR SEC: 0 SEC
STRESS POST PEAK BP: NORMAL MMHG
STRESS POST PEAK HR: 150 BPM
STRESS TARGET HR: 136 BPM

## 2017-08-29 PROCEDURE — 93325 DOPPLER ECHO COLOR FLOW MAPG: CPT | Performed by: INTERNAL MEDICINE

## 2017-08-29 PROCEDURE — 93350 STRESS TTE ONLY: CPT | Performed by: INTERNAL MEDICINE

## 2017-08-29 PROCEDURE — 93016 CV STRESS TEST SUPVJ ONLY: CPT | Performed by: INTERNAL MEDICINE

## 2017-08-29 PROCEDURE — 93017 CV STRESS TEST TRACING ONLY: CPT

## 2017-08-29 PROCEDURE — 93350 STRESS TTE ONLY: CPT

## 2017-08-29 PROCEDURE — 93325 DOPPLER ECHO COLOR FLOW MAPG: CPT

## 2017-08-29 PROCEDURE — 93320 DOPPLER ECHO COMPLETE: CPT | Performed by: INTERNAL MEDICINE

## 2017-08-29 PROCEDURE — 93320 DOPPLER ECHO COMPLETE: CPT

## 2017-08-29 PROCEDURE — 93018 CV STRESS TEST I&R ONLY: CPT | Performed by: INTERNAL MEDICINE

## 2017-09-08 DIAGNOSIS — F41.9 ANXIETY: ICD-10-CM

## 2017-09-08 RX ORDER — ALPRAZOLAM 0.5 MG/1
TABLET ORAL
Qty: 60 TABLET | Refills: 0 | OUTPATIENT
Start: 2017-09-08 | End: 2017-11-02 | Stop reason: SDUPTHER

## 2017-09-13 ENCOUNTER — OFFICE VISIT (OUTPATIENT)
Dept: GASTROENTEROLOGY | Facility: CLINIC | Age: 62
End: 2017-09-13

## 2017-09-13 VITALS
BODY MASS INDEX: 23.96 KG/M2 | TEMPERATURE: 98.6 F | WEIGHT: 135.2 LBS | HEIGHT: 63 IN | SYSTOLIC BLOOD PRESSURE: 128 MMHG | DIASTOLIC BLOOD PRESSURE: 84 MMHG

## 2017-09-13 DIAGNOSIS — Z80.0 FH: COLON CANCER: ICD-10-CM

## 2017-09-13 DIAGNOSIS — K63.5 COLON POLYPS: ICD-10-CM

## 2017-09-13 DIAGNOSIS — K21.9 GASTROESOPHAGEAL REFLUX DISEASE, ESOPHAGITIS PRESENCE NOT SPECIFIED: Primary | ICD-10-CM

## 2017-09-13 PROCEDURE — 99213 OFFICE O/P EST LOW 20 MIN: CPT | Performed by: INTERNAL MEDICINE

## 2017-09-13 NOTE — PROGRESS NOTES
Chief Complaint   Patient presents with   • Heartburn       History of Present Illness: She is here to get a refill on the Protonix. She occasionall has epigastric bloating but she eats little meals and does OK. She saw Dr. Priest at . He was recommending Domperidone but because of potential cardiac side effects she decided against it. She is on Pantoprazole 40 mg/day. No nausea, vomiting or dysphagia. NO diarrhea or constipation. No rectal bleeding or melena. Weight stable.     Past Medical History:   Diagnosis Date   • Anxiety    • Bladder incontinence    • Colon polyps 01/2015    HP & TA   • Dizziness    • GERD (gastroesophageal reflux disease)    • PAC (premature atrial contraction)    • Spinal headache     NO FAMILY HX   • Tachycardia        Past Surgical History:   Procedure Laterality Date   • BLADDER SLING MODIFIED, ANTERIOR AND POSTERIOR VAGINAL REPAIR  2007,2011   • BREAST BIOPSY     • COLONOSCOPY  01/16/2015    TA w/low grade dysplasia HP x 3   • COLONOSCOPY N/A 5/6/2016    Procedure: COLONOSCOPY with cold polypectomies;  Surgeon: Gustavo Guerrero MD;  Location: [facility] ENDOSCOPY;  Service: IH, HP glands, HP Polyp   • ENDOSCOPY  01/16/2015    erythematous mucosa in stomach   • HYSTERECTOMY     • LAPAROSCOPIC CHOLECYSTECTOMY  2009   • OOPHORECTOMY     • UPPER GASTROINTESTINAL ENDOSCOPY  11/26/2012    erythematous mucosa in stomach   • UPPER GASTROINTESTINAL ENDOSCOPY  06/11/2012    monoilial esophagitis, erythema         Current Outpatient Prescriptions:   •  ALPRAZolam (XANAX) 0.5 MG tablet, take 1 tablet by mouth twice a day if needed, Disp: 60 tablet, Rfl: 0  •  estradiol (MINIVELLE, VIVELLE-DOT) 0.1 MG/24HR patch, Place on the skin., Disp: , Rfl:   •  pantoprazole (PROTONIX) 40 MG EC tablet, Take 1 tablet by mouth daily in the morning., Disp: 90 tablet, Rfl: 0  •  ibuprofen (ADVIL,MOTRIN) 600 MG tablet, Take 1 tablet by mouth every 8 (eight) hours as needed for mild pain (1-3)., Disp: 30 tablet, Rfl:  0    Allergies   Allergen Reactions   • Codeine    • Prednisone        Family History   Problem Relation Age of Onset   • Diabetes Mother    • Hypertension Mother    • COPD Mother    • COPD Father    • Colon cancer Maternal Grandfather        Social History     Social History   • Marital status:      Spouse name: N/A   • Number of children: 2   • Years of education: N/A     Occupational History   • Not on file.     Social History Main Topics   • Smoking status: Former Smoker     Packs/day: 0.50     Quit date: 4/6/2011   • Smokeless tobacco: Never Used   • Alcohol use No   • Drug use: No   • Sexual activity: Not on file     Other Topics Concern   • Not on file     Social History Narrative       Review of Systems   All other systems reviewed and are negative.      Vitals:    09/13/17 1422   BP: 128/84   Temp: 98.6 °F (37 °C)       Physical Exam   Constitutional: She is oriented to person, place, and time. She appears well-developed and well-nourished. No distress.   HENT:   Head: Normocephalic and atraumatic. Hair is normal.   Right Ear: Hearing, tympanic membrane, external ear and ear canal normal. No drainage. No decreased hearing is noted.   Left Ear: Hearing, tympanic membrane, external ear and ear canal normal. No decreased hearing is noted.   Nose: No nasal deformity.   Mouth/Throat: Oropharynx is clear and moist.   Eyes: Conjunctivae, EOM and lids are normal. Pupils are equal, round, and reactive to light. Right eye exhibits no discharge. Left eye exhibits no discharge.   Neck: Normal range of motion. Neck supple. No JVD present. No tracheal deviation present. No thyromegaly present.   Cardiovascular: Normal rate, regular rhythm, normal heart sounds, intact distal pulses and normal pulses.  Exam reveals no gallop and no friction rub.    No murmur heard.  Pulmonary/Chest: Effort normal and breath sounds normal. No respiratory distress. She has no wheezes. She has no rales. She exhibits no tenderness.    Abdominal: Soft. Bowel sounds are normal. She exhibits no distension and no mass. There is no tenderness. There is no rebound and no guarding. No hernia.   Genitourinary: Rectal exam shows guaiac negative stool.   Genitourinary Comments: Normal anorectal exam.   Musculoskeletal: Normal range of motion. She exhibits no edema, tenderness or deformity.   Lymphadenopathy:     She has no cervical adenopathy.   Neurological: She is alert and oriented to person, place, and time. She has normal reflexes. She displays normal reflexes. No cranial nerve deficit. She exhibits normal muscle tone. Coordination normal.   Skin: Skin is warm and dry. No rash noted. She is not diaphoretic. No erythema.   Psychiatric: She has a normal mood and affect. Her behavior is normal. Judgment and thought content normal.   Vitals reviewed.      Ritu was seen today for heartburn.    Diagnoses and all orders for this visit:    Gastroesophageal reflux disease, esophagitis presence not specified    Colon polyps    FH: colon cancer       Assessment:  1) h/o colonic adenomas  2) FH (PGF and MGF) colon cancer.  3) h/o gastroparesis - She has seen Dr. Priest    Recommendations:  1) We discussed the pros and cons of chronic PPI use. Try to decrease of stop the PPI  2) f/u one year.       Return in about 1 year (around 9/13/2018).    Gustavo Guerrero MD  9/13/2017

## 2017-10-12 DIAGNOSIS — F41.9 ANXIETY: ICD-10-CM

## 2017-10-13 RX ORDER — ALPRAZOLAM 0.5 MG/1
TABLET ORAL
Qty: 60 TABLET | Refills: 0 | OUTPATIENT
Start: 2017-10-13

## 2017-11-02 ENCOUNTER — OFFICE VISIT (OUTPATIENT)
Dept: FAMILY MEDICINE CLINIC | Facility: CLINIC | Age: 62
End: 2017-11-02

## 2017-11-02 VITALS
DIASTOLIC BLOOD PRESSURE: 80 MMHG | HEIGHT: 63 IN | BODY MASS INDEX: 23.94 KG/M2 | TEMPERATURE: 98.2 F | SYSTOLIC BLOOD PRESSURE: 120 MMHG | HEART RATE: 86 BPM | WEIGHT: 135.1 LBS | OXYGEN SATURATION: 99 %

## 2017-11-02 DIAGNOSIS — F41.9 ANXIETY: ICD-10-CM

## 2017-11-02 DIAGNOSIS — Z23 ENCOUNTER FOR IMMUNIZATION: Primary | ICD-10-CM

## 2017-11-02 PROCEDURE — 99213 OFFICE O/P EST LOW 20 MIN: CPT | Performed by: FAMILY MEDICINE

## 2017-11-02 PROCEDURE — 90715 TDAP VACCINE 7 YRS/> IM: CPT | Performed by: FAMILY MEDICINE

## 2017-11-02 PROCEDURE — 90471 IMMUNIZATION ADMIN: CPT | Performed by: FAMILY MEDICINE

## 2017-11-02 RX ORDER — ALPRAZOLAM 0.5 MG/1
0.5 TABLET ORAL 2 TIMES DAILY PRN
Qty: 60 TABLET | Refills: 1 | Status: SHIPPED | OUTPATIENT
Start: 2017-11-02 | End: 2018-03-21 | Stop reason: SDUPTHER

## 2017-11-02 NOTE — PROGRESS NOTES
Subjective   Ritu Hanson is a 62 y.o. female. CC: anxiety    History of Present Illness     Ritu is a 62 year old female who comes in today for anxiety.  She is on Xanax.  We hd previously discussed cutting back on this and getting her off of it.  She is requesting to stay on it for the present time.  She is under a lot of stress right now.  Her father  earlier this year.  She and her  are currently .  They are working things through.    She is due for a tetanus shot and will get that today.      The following portions of the patient's history were reviewed and updated as appropriate: allergies, current medications, past medical history, past social history, past surgical history and problem list.    Review of Systems   Constitutional: Negative.  Negative for fatigue and unexpected weight change.   HENT: Negative.  Negative for congestion.    Respiratory: Negative.  Negative for cough, shortness of breath and wheezing.    Cardiovascular: Negative.  Negative for chest pain, palpitations and leg swelling.   Gastrointestinal: Negative.  Negative for diarrhea and nausea.   Genitourinary: Negative.  Negative for difficulty urinating and hematuria.   Musculoskeletal: Negative.  Negative for arthralgias and back pain.   Skin: Negative.  Negative for rash.   Neurological: Negative.  Negative for dizziness, light-headedness and headaches.   Psychiatric/Behavioral: Negative for dysphoric mood and sleep disturbance. The patient is nervous/anxious.        Objective   Physical Exam   Constitutional: She is oriented to person, place, and time. She appears well-developed and well-nourished.   Neck: Normal range of motion. Neck supple.   Cardiovascular: Normal rate, regular rhythm and normal heart sounds.    Pulmonary/Chest: Effort normal and breath sounds normal.   Neurological: She is alert and oriented to person, place, and time.   Skin: Skin is warm and dry. No rash noted.   Psychiatric: She has a  normal mood and affect. Her behavior is normal.   Nursing note and vitals reviewed.    Vitals:    11/02/17 1333   BP: 120/80   Pulse: 86   Temp: 98.2 °F (36.8 °C)   SpO2: 99%     Assessment/Plan   Problems Addressed this Visit        Other    Anxiety    Relevant Medications    ALPRAZolam (XANAX) 0.5 MG tablet      Other Visit Diagnoses     Encounter for immunization    -  Primary    Relevant Orders    Tdap Vaccine Greater Than or Equal To 8yo IM (Completed)      After obtaining informed consent, the immunization is given by Aura BUSH    The patient has read and signed the [facility] Controlled Substance Contract.  I will continue to see patient for regular follow up appointments.  They are well controlled on their medication.  EULALIO is updated every 3 months. The patient is aware of the potential for addiction and dependence.    EULALIO obtained today and is appropriate.

## 2018-02-22 ENCOUNTER — OFFICE VISIT (OUTPATIENT)
Dept: CARDIOLOGY | Facility: CLINIC | Age: 63
End: 2018-02-22

## 2018-02-22 VITALS
SYSTOLIC BLOOD PRESSURE: 138 MMHG | BODY MASS INDEX: 25.16 KG/M2 | WEIGHT: 142 LBS | HEIGHT: 63 IN | HEART RATE: 81 BPM | DIASTOLIC BLOOD PRESSURE: 84 MMHG

## 2018-02-22 DIAGNOSIS — R00.2 HEART PALPITATIONS: Primary | ICD-10-CM

## 2018-02-22 PROCEDURE — 99213 OFFICE O/P EST LOW 20 MIN: CPT | Performed by: INTERNAL MEDICINE

## 2018-02-22 PROCEDURE — 93000 ELECTROCARDIOGRAM COMPLETE: CPT | Performed by: INTERNAL MEDICINE

## 2018-02-22 NOTE — PROGRESS NOTES
Ritu Hanson  1955  Date of Office Visit: 2/22/18  Encounter Provider: Colton Her MD  Place of Service: [facility]      CHIEF COMPLAINT:  Palpitations  Left arm pain      HISTORY OF PRESENT ILLNESS:   Dr. Novak,   I had the pleasure of seeing your patient Ritu Hanson in follow-up today.  As you well know she is a very pleasant 62-year-old female with a medical history of previous tobacco abuse, gastroesophageal reflux disease, anxiety, who presented to me secondary to palpitations and left arm discomfort.  She stated that she had noticed palpitations that come on typically daily.  They were documented to be moderate in intensity and last approximately 5 minutes in duration.    She underwent an evaluation including a stress echocardiogram which is documented below. She did have 1.5 mm of ST depression documented on the stress test; however, her echo images during her stress echocardiogram were normal. I did not recommend additional ischemic evaluation. Her monitor that she wore showed sinus with rare PACs. She had no atrial fibrillation or sustained arrhythmias.          Review of Systems   Constitution: Negative for fever, weakness and malaise/fatigue.   HENT: Negative for nosebleeds and sore throat.    Eyes: Negative for blurred vision and double vision.   Cardiovascular: Positive for palpitations. Negative for chest pain, claudication and syncope.   Respiratory: Negative for cough, shortness of breath and snoring.    Endocrine: Negative for cold intolerance, heat intolerance and polydipsia.   Skin: Negative for itching, poor wound healing and rash.   Musculoskeletal: Negative for joint pain, joint swelling, muscle weakness and myalgias.   Gastrointestinal: Negative for abdominal pain, melena, nausea and vomiting.   Neurological: Negative for light-headedness, loss of balance, seizures and vertigo.   Psychiatric/Behavioral: Negative for altered  "mental status and depression.          Past Medical History:   Diagnosis Date   • Anxiety    • Bladder incontinence    • Colon polyps 01/2015    HP & TA   • Dizziness    • GERD (gastroesophageal reflux disease)    • PAC (premature atrial contraction)    • Spinal headache     NO FAMILY HX   • Tachycardia        The following portions of the patient's history were reviewed and updated as appropriate: Social history , Family history and Surgical history     Current Outpatient Prescriptions on File Prior to Visit   Medication Sig Dispense Refill   • ALPRAZolam (XANAX) 0.5 MG tablet Take 1 tablet by mouth 2 (Two) Times a Day As Needed for Anxiety. 60 tablet 1   • estradiol (MINIVELLE, VIVELLE-DOT) 0.1 MG/24HR patch Place 1 patch on the skin 2 (Two) Times a Week.     • ibuprofen (ADVIL,MOTRIN) 600 MG tablet Take 1 tablet by mouth every 8 (eight) hours as needed for mild pain (1-3). 30 tablet 0   • pantoprazole (PROTONIX) 40 MG EC tablet Take 1 tablet by mouth Daily. 90 tablet 3     No current facility-administered medications on file prior to visit.        Allergies   Allergen Reactions   • Codeine    • Prednisone        Vitals:    02/22/18 1339   BP: 138/84   Pulse: 81   Weight: 64.4 kg (142 lb)   Height: 160 cm (63\")     Physical Exam   Constitutional: She is oriented to person, place, and time. She appears well-developed and well-nourished.   HENT:   Head: Normocephalic and atraumatic.   Eyes: Conjunctivae and EOM are normal. No scleral icterus.   Neck: Normal range of motion. Neck supple. Normal carotid pulses, no hepatojugular reflux and no JVD present. Carotid bruit is not present. No tracheal deviation present. No thyromegaly present.   Cardiovascular: Normal rate and regular rhythm.  Exam reveals no gallop and no friction rub.    No murmur heard.  Pulses:       Carotid pulses are 2+ on the right side, and 2+ on the left side.       Radial pulses are 2+ on the right side, and 2+ on the left side.        Femoral " pulses are 2+ on the right side, and 2+ on the left side.       Dorsalis pedis pulses are 2+ on the right side, and 2+ on the left side.        Posterior tibial pulses are 2+ on the right side, and 2+ on the left side.   Pulmonary/Chest: Breath sounds normal. No respiratory distress. She has no decreased breath sounds. She has no wheezes. She has no rhonchi. She has no rales. She exhibits no tenderness.   Abdominal: Soft. Bowel sounds are normal. She exhibits no distension. There is no tenderness. There is no rebound.   Musculoskeletal: She exhibits no edema or deformity.   Neurological: She is alert and oriented to person, place, and time. She has normal strength. No sensory deficit.   Skin: No rash noted. No erythema.   Psychiatric: She has a normal mood and affect. Her behavior is normal.     No components found for: CBC  No results found for: CMP  No components found for: LIPID  No results found for: BMP      ECG 12 Lead  Date/Time: 2/22/2018 1:59 PM  Performed by: BREE MONTEIRO  Authorized by: BREE MONTEIRO   Comparison: compared with previous ECG from 8/25/2017  Similar to previous ECG  Rhythm: sinus rhythm  Rate: normal  Conduction: conduction normal  ST Segments: ST segments normal  T Waves: T waves normal  QRS axis: normal  Clinical impression: normal ECG               8/31/17  · A normal monitor study.   Sinus rhythm with rare PACs.  No atrial fibrillation.     8/29/17 Stress echo  · Left ventricular systolic function is normal. Calculated EF = 63%.  · ST segment depression of 1.5 mm was noted during stress, beginning at 3 minutes of stress, and returning to baseline after 5-9 minutes of recovery.  · Normal stress echo with no significant echocardiographic evidence for myocardial ischemia.    DISCUSSION/SUMMARY  62-year-old female with no significant cardiac history who presents to me for evaluation of palpitations and occasional left arm discomfort.  The palpitations and arm discomfort do  not appear to be exertional, and I did not think they were representative of ischemia or angina.  She underwent a Holter monitor as documented above which is rare PACs.  Her she rests echocardiogram did show ST depression, however no echocardiographic evidence for ischemia.  No additional ischemic evaluation was warranted..    1.  Palpitations: Short in duration, low risk   -Monitor previously just documented rare premature atrial complexes.    I would not recommend beta-blockade or additional evaluation at this time.  I will see her back as needed.

## 2018-03-21 ENCOUNTER — TELEPHONE (OUTPATIENT)
Dept: FAMILY MEDICINE CLINIC | Facility: CLINIC | Age: 63
End: 2018-03-21

## 2018-03-21 DIAGNOSIS — F41.9 ANXIETY: ICD-10-CM

## 2018-03-22 RX ORDER — ALPRAZOLAM 0.5 MG/1
0.5 TABLET ORAL 2 TIMES DAILY PRN
Qty: 60 TABLET | Refills: 0 | OUTPATIENT
Start: 2018-03-22 | End: 2018-06-18 | Stop reason: SDUPTHER

## 2018-04-24 ENCOUNTER — APPOINTMENT (OUTPATIENT)
Dept: GENERAL RADIOLOGY | Facility: HOSPITAL | Age: 63
End: 2018-04-24

## 2018-04-24 PROCEDURE — 71046 X-RAY EXAM CHEST 2 VIEWS: CPT | Performed by: EMERGENCY MEDICINE

## 2018-05-22 ENCOUNTER — TRANSCRIBE ORDERS (OUTPATIENT)
Dept: ADMINISTRATIVE | Facility: HOSPITAL | Age: 63
End: 2018-05-22

## 2018-05-22 DIAGNOSIS — Z12.39 SCREENING BREAST EXAMINATION: Primary | ICD-10-CM

## 2018-06-15 ENCOUNTER — HOSPITAL ENCOUNTER (OUTPATIENT)
Dept: MAMMOGRAPHY | Facility: HOSPITAL | Age: 63
Discharge: HOME | End: 2018-06-15
Attending: OBSTETRICS & GYNECOLOGY | Admitting: OBSTETRICS & GYNECOLOGY

## 2018-06-15 DIAGNOSIS — Z12.39 SCREENING BREAST EXAMINATION: ICD-10-CM

## 2018-06-15 DIAGNOSIS — F41.9 ANXIETY: ICD-10-CM

## 2018-06-15 PROCEDURE — 77063 BREAST TOMOSYNTHESIS BI: CPT

## 2018-06-15 PROCEDURE — 77067 SCR MAMMO BI INCL CAD: CPT

## 2018-06-15 RX ORDER — ALPRAZOLAM 0.5 MG/1
TABLET ORAL
Qty: 60 TABLET | Refills: 0 | Status: CANCELLED | OUTPATIENT
Start: 2018-06-15

## 2018-06-18 DIAGNOSIS — F41.9 ANXIETY: ICD-10-CM

## 2018-06-18 RX ORDER — ALPRAZOLAM 0.5 MG/1
0.5 TABLET ORAL 2 TIMES DAILY PRN
Qty: 60 TABLET | Refills: 0 | Status: SHIPPED | OUTPATIENT
Start: 2018-06-18 | End: 2018-09-12 | Stop reason: SDUPTHER

## 2018-09-05 ENCOUNTER — OFFICE VISIT (OUTPATIENT)
Dept: FAMILY MEDICINE CLINIC | Facility: CLINIC | Age: 63
End: 2018-09-05

## 2018-09-05 VITALS
SYSTOLIC BLOOD PRESSURE: 132 MMHG | BODY MASS INDEX: 25.87 KG/M2 | OXYGEN SATURATION: 99 % | HEART RATE: 89 BPM | HEIGHT: 63 IN | WEIGHT: 146 LBS | DIASTOLIC BLOOD PRESSURE: 88 MMHG

## 2018-09-05 DIAGNOSIS — D72.819 LEUKOPENIA, UNSPECIFIED TYPE: ICD-10-CM

## 2018-09-05 DIAGNOSIS — K31.84 GASTROPARESIS: ICD-10-CM

## 2018-09-05 DIAGNOSIS — R73.9 HYPERGLYCEMIA: ICD-10-CM

## 2018-09-05 DIAGNOSIS — E78.49 OTHER HYPERLIPIDEMIA: ICD-10-CM

## 2018-09-05 DIAGNOSIS — F41.9 ANXIETY: Primary | ICD-10-CM

## 2018-09-05 PROCEDURE — 99214 OFFICE O/P EST MOD 30 MIN: CPT | Performed by: FAMILY MEDICINE

## 2018-09-07 ENCOUNTER — TRANSCRIBE ORDERS (OUTPATIENT)
Dept: ADMINISTRATIVE | Facility: HOSPITAL | Age: 63
End: 2018-09-07

## 2018-09-07 ENCOUNTER — APPOINTMENT (OUTPATIENT)
Dept: LAB | Facility: HOSPITAL | Age: 63
End: 2018-09-07

## 2018-09-07 LAB
ALBUMIN SERPL-MCNC: 4.5 G/DL (ref 3.5–5.2)
ALBUMIN/GLOB SERPL: 1.6 G/DL
ALP SERPL-CCNC: 80 U/L (ref 39–117)
ALT SERPL W P-5'-P-CCNC: 10 U/L (ref 1–33)
ANION GAP SERPL CALCULATED.3IONS-SCNC: 12.3 MMOL/L
AST SERPL-CCNC: 11 U/L (ref 1–32)
BASOPHILS # BLD AUTO: 0.03 10*3/MM3 (ref 0–0.2)
BASOPHILS NFR BLD AUTO: 0.4 % (ref 0–1.5)
BILIRUB SERPL-MCNC: 0.3 MG/DL (ref 0.1–1.2)
BUN BLD-MCNC: 9 MG/DL (ref 8–23)
BUN/CREAT SERPL: 13.2 (ref 7–25)
CALCIUM SPEC-SCNC: 9 MG/DL (ref 8.6–10.5)
CHLORIDE SERPL-SCNC: 102 MMOL/L (ref 98–107)
CHOLEST SERPL-MCNC: 164 MG/DL (ref 0–200)
CO2 SERPL-SCNC: 26.7 MMOL/L (ref 22–29)
CREAT BLD-MCNC: 0.68 MG/DL (ref 0.57–1)
DEPRECATED RDW RBC AUTO: 43.1 FL (ref 37–54)
EOSINOPHIL # BLD AUTO: 0.14 10*3/MM3 (ref 0–0.7)
EOSINOPHIL NFR BLD AUTO: 1.9 % (ref 0.3–6.2)
ERYTHROCYTE [DISTWIDTH] IN BLOOD BY AUTOMATED COUNT: 13.4 % (ref 11.7–13)
GFR SERPL CREATININE-BSD FRML MDRD: 88 ML/MIN/1.73
GLOBULIN UR ELPH-MCNC: 2.8 GM/DL
GLUCOSE BLD-MCNC: 94 MG/DL (ref 65–99)
HCT VFR BLD AUTO: 39.7 % (ref 35.6–45.5)
HDLC SERPL-MCNC: 53 MG/DL (ref 40–60)
HGB BLD-MCNC: 12.2 G/DL (ref 11.9–15.5)
IMM GRANULOCYTES # BLD: 0 10*3/MM3 (ref 0–0.03)
IMM GRANULOCYTES NFR BLD: 0 % (ref 0–0.5)
LDLC SERPL CALC-MCNC: 98 MG/DL (ref 0–100)
LDLC/HDLC SERPL: 1.84 {RATIO}
LYMPHOCYTES # BLD AUTO: 1.3 10*3/MM3 (ref 0.9–4.8)
LYMPHOCYTES NFR BLD AUTO: 17.4 % (ref 19.6–45.3)
MCH RBC QN AUTO: 27.1 PG (ref 26.9–32)
MCHC RBC AUTO-ENTMCNC: 30.7 G/DL (ref 32.4–36.3)
MCV RBC AUTO: 88.2 FL (ref 80.5–98.2)
MONOCYTES # BLD AUTO: 0.85 10*3/MM3 (ref 0.2–1.2)
MONOCYTES NFR BLD AUTO: 11.4 % (ref 5–12)
NEUTROPHILS # BLD AUTO: 5.15 10*3/MM3 (ref 1.9–8.1)
NEUTROPHILS NFR BLD AUTO: 68.9 % (ref 42.7–76)
PLATELET # BLD AUTO: 362 10*3/MM3 (ref 140–500)
PMV BLD AUTO: 10.4 FL (ref 6–12)
POTASSIUM BLD-SCNC: 4.2 MMOL/L (ref 3.5–5.2)
PROT SERPL-MCNC: 7.3 G/DL (ref 6–8.5)
RBC # BLD AUTO: 4.5 10*6/MM3 (ref 3.9–5.2)
SODIUM BLD-SCNC: 141 MMOL/L (ref 136–145)
TRIGL SERPL-MCNC: 67 MG/DL (ref 0–150)
VLDLC SERPL-MCNC: 13.4 MG/DL (ref 5–40)
WBC NRBC COR # BLD: 7.47 10*3/MM3 (ref 4.5–10.7)

## 2018-09-07 PROCEDURE — 80061 LIPID PANEL: CPT | Performed by: FAMILY MEDICINE

## 2018-09-07 PROCEDURE — 85025 COMPLETE CBC W/AUTO DIFF WBC: CPT | Performed by: FAMILY MEDICINE

## 2018-09-07 PROCEDURE — 36415 COLL VENOUS BLD VENIPUNCTURE: CPT | Performed by: FAMILY MEDICINE

## 2018-09-07 PROCEDURE — 80053 COMPREHEN METABOLIC PANEL: CPT | Performed by: FAMILY MEDICINE

## 2018-09-12 ENCOUNTER — TELEPHONE (OUTPATIENT)
Dept: FAMILY MEDICINE CLINIC | Facility: CLINIC | Age: 63
End: 2018-09-12

## 2018-09-12 DIAGNOSIS — F41.9 ANXIETY: ICD-10-CM

## 2018-09-13 RX ORDER — ALPRAZOLAM 0.25 MG/1
0.25 TABLET ORAL DAILY PRN
Qty: 36 TABLET | Refills: 0 | Status: SHIPPED | OUTPATIENT
Start: 2018-09-13 | End: 2018-12-05 | Stop reason: SDUPTHER

## 2018-11-13 RX ORDER — PANTOPRAZOLE SODIUM 40 MG/1
40 TABLET, DELAYED RELEASE ORAL DAILY
Qty: 90 TABLET | Refills: 3 | OUTPATIENT
Start: 2018-11-13

## 2018-11-14 ENCOUNTER — TELEPHONE (OUTPATIENT)
Dept: FAMILY MEDICINE CLINIC | Facility: CLINIC | Age: 63
End: 2018-11-14

## 2018-11-14 NOTE — TELEPHONE ENCOUNTER
Patient called requesting a letter be written for her dog, she takes the dog everywhere with her and its not official a therapy pet but would like something stating that she is allowed

## 2018-11-20 ENCOUNTER — TELEPHONE (OUTPATIENT)
Dept: FAMILY MEDICINE CLINIC | Facility: CLINIC | Age: 63
End: 2018-11-20

## 2018-11-20 RX ORDER — PANTOPRAZOLE SODIUM 40 MG/1
40 TABLET, DELAYED RELEASE ORAL DAILY
Qty: 90 TABLET | Refills: 3 | OUTPATIENT
Start: 2018-11-20

## 2018-11-20 NOTE — TELEPHONE ENCOUNTER
Patient called requesting a letter to be written for her pet, or direction in where to start on getting her pet approved to be a therapy dog

## 2018-11-21 ENCOUNTER — TELEPHONE (OUTPATIENT)
Dept: GASTROENTEROLOGY | Facility: CLINIC | Age: 63
End: 2018-11-21

## 2018-11-21 RX ORDER — PANTOPRAZOLE SODIUM 40 MG/1
40 TABLET, DELAYED RELEASE ORAL DAILY
Qty: 90 TABLET | Refills: 0 | Status: SHIPPED | OUTPATIENT
Start: 2018-11-21 | End: 2019-01-14 | Stop reason: SDUPTHER

## 2018-11-21 NOTE — TELEPHONE ENCOUNTER
----- Message from Bijal Zuniga sent at 11/21/2018 10:17 AM EST -----  Regarding: med refill  Pt needs her pantoprazol refilled she has her follow up scheduled for 12-26 can you please call it in to the [facility] thank you so much

## 2018-11-21 NOTE — TELEPHONE ENCOUNTER
See o/v note of 9/13/17.  Escribe completed for pantoprazole 40 mg 1 tab po daily, #90, R0.    VM to pt - identifies as Ritu.  Advise that pantoprazole has been refilled.  Contact office if any questions.

## 2018-11-27 RX ORDER — PANTOPRAZOLE SODIUM 40 MG/1
40 TABLET, DELAYED RELEASE ORAL DAILY
Qty: 90 TABLET | Refills: 0 | OUTPATIENT
Start: 2018-11-27

## 2018-12-05 ENCOUNTER — OFFICE VISIT (OUTPATIENT)
Dept: FAMILY MEDICINE CLINIC | Facility: CLINIC | Age: 63
End: 2018-12-05

## 2018-12-05 VITALS
HEART RATE: 88 BPM | BODY MASS INDEX: 25.69 KG/M2 | WEIGHT: 145 LBS | SYSTOLIC BLOOD PRESSURE: 132 MMHG | HEIGHT: 63 IN | DIASTOLIC BLOOD PRESSURE: 78 MMHG | OXYGEN SATURATION: 99 %

## 2018-12-05 DIAGNOSIS — F41.9 ANXIETY: ICD-10-CM

## 2018-12-05 PROCEDURE — 99213 OFFICE O/P EST LOW 20 MIN: CPT | Performed by: FAMILY MEDICINE

## 2018-12-05 RX ORDER — ALPRAZOLAM 0.25 MG/1
0.25 TABLET ORAL DAILY PRN
Qty: 36 TABLET | Refills: 0 | Status: SHIPPED | OUTPATIENT
Start: 2018-12-05 | End: 2019-03-26 | Stop reason: SDUPTHER

## 2018-12-05 RX ORDER — INFLUENZA A VIRUS A/SINGAPORE/GP1908/2015 IVR-180A (H1N1) ANTIGEN (PROPIOLACTONE INACTIVATED), INFLUENZA A VIRUS A/SINGAPORE/INFIMH-16-0019/2016 IVR-186 (H3N2) ANTIGEN (PROPIOLACTONE INACTIVATED), INFLUENZA B VIRUS B/MARYLAND/15/2016 ANTIGEN (PROPIOLACTONE INACTIVATED), AND INFLUENZA B VIRUS B/PHUKET/3073/2013 BVR-1B ANTIGEN (PROPIOLACTONE INACTIVATED) 15; 15; 15; 15 UG/.5ML; UG/.5ML; UG/.5ML; UG/.5ML
INJECTION, SUSPENSION INTRAMUSCULAR
Refills: 0 | COMMUNITY
Start: 2018-09-27 | End: 2019-05-14

## 2018-12-05 NOTE — PROGRESS NOTES
Subjective    Chief Complaint   Patient presents with   • Anxiety     dicuss medication        Ritu Hanson is a 63 y.o. female.     History of Present Illness   Anxiety  Considering an SSRI but she has gastroparesis with multiple intermittent flares and going to ask GI considering all the flares. She controls her diet and takes alprazolam for the shakes. Will use the alprazolam, last script was for 36 in 8/2018 and did not need more.   She cut her skin on her left forearm prior to leaving work today for this appointment. A fellow nurse cleaned her up and she has gauze and tape in place, small distal area with blood but no active bleeding, pt declines exam of this. She is not on a blood thinner, this is a normal result of small trauma for her.     The following portions of the patient's history were reviewed and updated as appropriate: allergies, current medications, past medical history, past social history and problem list.    Review of Systems   Constitutional: Negative.    HENT: Negative.    Eyes: Negative.    Respiratory: Negative.    Cardiovascular: Negative.    Gastrointestinal: Negative.    Endocrine: Negative.    Genitourinary: Negative.    Musculoskeletal: Negative.    Skin: Negative.    Allergic/Immunologic: Negative.    Neurological: Negative.    Hematological: Negative.    Psychiatric/Behavioral: Negative.        Objective    Vitals:    12/05/18 1430   BP: 132/78   Pulse: 88   SpO2: 99%       Physical Exam   Constitutional: She is oriented to person, place, and time. She appears well-nourished. No distress.   Eyes: Conjunctivae are normal. No scleral icterus.   Pulmonary/Chest: Effort normal. No respiratory distress.   Neurological: She is alert and oriented to person, place, and time.   Psychiatric: She has a normal mood and affect. Her behavior is normal.   Vitals reviewed.  pt in good spirits today, smiling and talkative.    Assessment/Plan   Ritu was seen today for anxiety.    Diagnoses and all  orders for this visit:    Anxiety  -     ALPRAZolam (XANAX) 0.25 MG tablet; Take 1 tablet by mouth Daily As Needed for Anxiety.

## 2019-01-14 ENCOUNTER — OFFICE VISIT (OUTPATIENT)
Dept: GASTROENTEROLOGY | Facility: CLINIC | Age: 64
End: 2019-01-14

## 2019-01-14 VITALS
SYSTOLIC BLOOD PRESSURE: 124 MMHG | WEIGHT: 145.6 LBS | DIASTOLIC BLOOD PRESSURE: 78 MMHG | HEIGHT: 62 IN | BODY MASS INDEX: 26.79 KG/M2 | TEMPERATURE: 97.6 F

## 2019-01-14 DIAGNOSIS — K21.9 GASTROESOPHAGEAL REFLUX DISEASE, ESOPHAGITIS PRESENCE NOT SPECIFIED: Primary | ICD-10-CM

## 2019-01-14 DIAGNOSIS — D12.6 ADENOMATOUS POLYP OF COLON, UNSPECIFIED PART OF COLON: ICD-10-CM

## 2019-01-14 DIAGNOSIS — K31.84 GASTROPARESIS: ICD-10-CM

## 2019-01-14 PROCEDURE — 99214 OFFICE O/P EST MOD 30 MIN: CPT | Performed by: NURSE PRACTITIONER

## 2019-01-14 RX ORDER — PANTOPRAZOLE SODIUM 40 MG/1
40 TABLET, DELAYED RELEASE ORAL DAILY
Qty: 90 TABLET | Refills: 3 | Status: SHIPPED | OUTPATIENT
Start: 2019-01-14 | End: 2020-02-26 | Stop reason: SDUPTHER

## 2019-01-14 RX ORDER — MELATONIN
2000 DAILY
COMMUNITY
End: 2021-03-01

## 2019-01-14 NOTE — PROGRESS NOTES
Chief Complaint   Patient presents with   • Follow-up     medication refill        Ritu Hanson is a  63 y.o. female here for a follow up visit for GERD.    HPI  62 yo f presents today for follow up visit for GERD, gastroparesis and hx colon polyps. She is a patient of Dr. Guerrero. She was last seen in the office on 9/2017. She has hx GERD and admits the protonix 40 mg daily works well for her. She denies any dysphagia, reflux, abd pain, N&V, diarrhea, constipation, rectal bleeding or melena. She does report some intermittent rectal itching but otherwise she admits she is doing well. She uses preparation H and Vaseline and admits these help. She also has hx gastroparesis and reports she controls it with diet modification. [facility] of a [facility] there. She admits her appetite is good and her weight is stable. She does have hx adenomatous colon polyps and her last Colonoscopy was 5/2016.     Past Medical History:   Diagnosis Date   • Anxiety    • Bladder incontinence    • Colon polyps 01/2015    HP & TA   • Dizziness    • GERD (gastroesophageal reflux disease)    • PAC (premature atrial contraction)    • Spinal headache     NO FAMILY HX   • Tachycardia        Past Surgical History:   Procedure Laterality Date   • BLADDER SLING MODIFIED, ANTERIOR AND POSTERIOR VAGINAL REPAIR  2007,2011   • BREAST BIOPSY     • COLONOSCOPY  01/16/2015    TA w/low grade dysplasia HP x 3   • COLONOSCOPY N/A 5/6/2016    IH, multiple hyperplastic polyps, otherwise normal exam   • ENDOSCOPY  01/16/2015    erythematous mucosa in stomach   • HYSTERECTOMY     • LAPAROSCOPIC CHOLECYSTECTOMY  2009   • OOPHORECTOMY     • UPPER GASTROINTESTINAL ENDOSCOPY  11/26/2012    erythematous mucosa in stomach   • UPPER GASTROINTESTINAL ENDOSCOPY  06/11/2012    monoilial esophagitis, erythema       Scheduled Meds:    Continuous Infusions:  No current facility-administered medications for this visit.     PRN  Meds:.    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   Tobacco Use   • Smoking status: Former Smoker     Packs/day: 0.50     Years: 30.00     Pack years: 15.00     Last attempt to quit: 2009     Years since quitting: 10.0   • Smokeless tobacco: Never Used   Substance and Sexual Activity   • Alcohol use: No   • Drug use: No   • Sexual activity: Defer   Other Topics Concern   • Not on file   Social History Narrative   • Not on file       Family History   Problem Relation Age of Onset   • Diabetes Mother    • Hypertension Mother    • COPD Mother    • COPD Father    • Colon cancer Maternal Grandfather    • No Known Problems Sister    • No Known Problems Brother    • No Known Problems Sister    • No Known Problems Sister        Review of Systems   Constitutional: Negative for appetite change, chills, diaphoresis, fatigue, fever and unexpected weight change.   HENT: Negative for nosebleeds, postnasal drip, sore throat, trouble swallowing and voice change.    Respiratory: Negative for cough, choking, chest tightness, shortness of breath and wheezing.    Cardiovascular: Negative for chest pain.   Gastrointestinal: Negative for abdominal distention, abdominal pain, anal bleeding, blood in stool, constipation, diarrhea, nausea, rectal pain and vomiting.   Endocrine: Negative for polydipsia, polyphagia and polyuria.   Musculoskeletal: Negative for gait problem.   Skin: Negative for rash and wound.    Allergic/Immunologic: Negative for food allergies.   Neurological: Negative for dizziness, speech difficulty and light-headedness.   Psychiatric/Behavioral: Negative for confusion, self-injury, sleep disturbance and suicidal ideas.       Vitals:    01/14/19 1350   BP: 124/78   Temp: 97.6 °F (36.4 °C)       Physical Exam   Constitutional: She is oriented to person, place, and time. She appears well-developed and well-nourished. She does not appear ill. No distress.   HENT:   Head: Normocephalic.   Eyes: Pupils are equal, round, and reactive to light.   Cardiovascular: Normal rate, regular rhythm and normal heart sounds.   Pulmonary/Chest: Effort normal and breath sounds normal.   Abdominal: Soft. Bowel sounds are normal. She exhibits no distension and no mass. There is no hepatosplenomegaly. There is no tenderness. There is no rebound and no guarding. No hernia.   Musculoskeletal: Normal range of motion.   Neurological: She is alert and oriented to person, place, and time.   Skin: Skin is warm and dry.   Psychiatric: She has a normal mood and affect. Her speech is normal and behavior is normal. Judgment normal.       No images are attached to the encounter.    Assessment & Plan    1. Gastroesophageal reflux disease, esophagitis presence not specified    2. Gastroparesis    3. Adenomatous polyp of colon, unspecified part of colon    GERD is well controlled on Protonix 40 mg daily. Will refill it x 1 year. Gastroparesis doing well with diet control. She does have 1 more follow up planned with  [facility]. Next colonoscopy due 5/2021. Call office with any issues. Follow up with me or Dr. Guerrero in 1 year.

## 2019-02-14 ENCOUNTER — OFFICE VISIT (OUTPATIENT)
Dept: GASTROENTEROLOGY | Facility: CLINIC | Age: 64
End: 2019-02-14

## 2019-02-14 VITALS
WEIGHT: 144 LBS | SYSTOLIC BLOOD PRESSURE: 114 MMHG | TEMPERATURE: 97.7 F | DIASTOLIC BLOOD PRESSURE: 84 MMHG | BODY MASS INDEX: 26.5 KG/M2 | HEIGHT: 62 IN

## 2019-02-14 DIAGNOSIS — L29.0 ANAL ITCHING: Primary | ICD-10-CM

## 2019-02-14 PROCEDURE — 99213 OFFICE O/P EST LOW 20 MIN: CPT | Performed by: NURSE PRACTITIONER

## 2019-02-14 NOTE — PROGRESS NOTES
Chief Complaint   Patient presents with   • Follow-up   • Rectal Pain   • Abdominal Cramping   • change in bowel habits       Ritu Hanson is a  63 y.o. female here for anal itching.     HPI  64 yo established f presents today for anal itching. She is a patient of Dr. Guerrero. She was last seen in the office on 1/14/19. She admits for the past week she has been having anal itching. She denies any change in bowel habits, rectal bleeding or hemorrhoids. She has been using preparation H cream externally but she doesn't feel like the cream is getting where it needs to go. She feels the itching right up inside the rectum. She also tried some other OTC rectal cream and it didn't work either. She denies any dysphagia, reflux, abd pain, N&V, constipation or diarrhea. She has been taking some SITZ baths with vinegar and she thinks that helps a little.       Past Medical History:   Diagnosis Date   • Anxiety    • Bladder incontinence    • Colon polyps 01/2015    HP & TA   • Dizziness    • GERD (gastroesophageal reflux disease)    • PAC (premature atrial contraction)    • Spinal headache     NO FAMILY HX   • Tachycardia        Past Surgical History:   Procedure Laterality Date   • BLADDER SLING MODIFIED, ANTERIOR AND POSTERIOR VAGINAL REPAIR  2007,2011   • BREAST BIOPSY     • COLONOSCOPY  01/16/2015    TA w/low grade dysplasia HP x 3   • COLONOSCOPY N/A 5/6/2016    IH, multiple hyperplastic polyps, otherwise normal exam   • ENDOSCOPY  01/16/2015    erythematous mucosa in stomach   • HYSTERECTOMY     • LAPAROSCOPIC CHOLECYSTECTOMY  2009   • OOPHORECTOMY     • UPPER GASTROINTESTINAL ENDOSCOPY  11/26/2012    erythematous mucosa in stomach   • UPPER GASTROINTESTINAL ENDOSCOPY  06/11/2012    monoilial esophagitis, erythema       Scheduled Meds:    Continuous Infusions:  No current facility-administered medications for this visit.     PRN Meds:.    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   Tobacco Use   • Smoking status: Former Smoker     Packs/day: 0.50     Years: 30.00     Pack years: 15.00     Last attempt to quit: 2009     Years since quitting: 10.1   • Smokeless tobacco: Never Used   Substance and Sexual Activity   • Alcohol use: No   • Drug use: No   • Sexual activity: Defer   Other Topics Concern   • Not on file   Social History Narrative   • Not on file       Family History   Problem Relation Age of Onset   • Diabetes Mother    • Hypertension Mother    • COPD Mother    • COPD Father    • Colon cancer Maternal Grandfather    • No Known Problems Sister    • No Known Problems Brother    • No Known Problems Sister    • No Known Problems Sister        Review of Systems   Constitutional: Negative for appetite change, chills, diaphoresis, fatigue, fever and unexpected weight change.   HENT: Negative for nosebleeds, postnasal drip, sore throat, trouble swallowing and voice change.    Respiratory: Negative for cough, choking, chest tightness, shortness of breath and wheezing.    Cardiovascular: Negative for chest pain.   Gastrointestinal: Negative for abdominal distention, abdominal pain, anal bleeding, blood in stool, constipation, diarrhea, nausea, rectal pain and vomiting.   Endocrine: Negative for polydipsia, polyphagia and polyuria.   Musculoskeletal: Negative for gait problem.   Skin: Negative for rash and wound.   Allergic/Immunologic: Negative for food allergies.   Neurological: Negative for dizziness,  speech difficulty and light-headedness.   Psychiatric/Behavioral: Negative for confusion, self-injury, sleep disturbance and suicidal ideas.       Vitals:    02/14/19 1413   BP: 114/84   Temp: 97.7 °F (36.5 °C)       Physical Exam   Constitutional: She is oriented to person, place, and time. She appears well-developed and well-nourished. She does not appear ill. No distress.   HENT:   Head: Normocephalic.   Eyes: Pupils are equal, round, and reactive to light.   Cardiovascular: Normal rate, regular rhythm and normal heart sounds.   Pulmonary/Chest: Effort normal and breath sounds normal.   Abdominal: Soft. Bowel sounds are normal. She exhibits no distension and no mass. There is no hepatosplenomegaly. There is no tenderness. There is no rebound and no guarding. No hernia.   Musculoskeletal: Normal range of motion.   Neurological: She is alert and oriented to person, place, and time.   Skin: Skin is warm and dry.   Psychiatric: She has a normal mood and affect. Her speech is normal and behavior is normal. Judgment normal.       No images are attached to the encounter.    Assessment & Plan     1. Anal itching    Sounds like the itching is right up inside the rectal vault. Will have her try some preparation H suppositories OTC and see how she does. She needs to try and refrain from scratching if at all possible. Patient to call the office with update in 1 week. Call office with any issues. Follow up as planned.

## 2019-03-26 DIAGNOSIS — F41.9 ANXIETY: ICD-10-CM

## 2019-03-28 RX ORDER — ALPRAZOLAM 0.25 MG/1
TABLET ORAL
Qty: 36 TABLET | Refills: 0 | Status: SHIPPED | OUTPATIENT
Start: 2019-03-28 | End: 2019-05-14 | Stop reason: SDUPTHER

## 2019-05-09 ENCOUNTER — APPOINTMENT (OUTPATIENT)
Dept: GENERAL RADIOLOGY | Facility: HOSPITAL | Age: 64
End: 2019-05-09

## 2019-05-09 PROCEDURE — 73030 X-RAY EXAM OF SHOULDER: CPT | Performed by: PHYSICIAN ASSISTANT

## 2019-05-14 ENCOUNTER — OFFICE VISIT (OUTPATIENT)
Dept: FAMILY MEDICINE CLINIC | Facility: CLINIC | Age: 64
End: 2019-05-14

## 2019-05-14 VITALS
HEART RATE: 70 BPM | SYSTOLIC BLOOD PRESSURE: 140 MMHG | BODY MASS INDEX: 26.5 KG/M2 | DIASTOLIC BLOOD PRESSURE: 88 MMHG | OXYGEN SATURATION: 100 % | TEMPERATURE: 98.4 F | WEIGHT: 144 LBS | HEIGHT: 62 IN | RESPIRATION RATE: 20 BRPM

## 2019-05-14 DIAGNOSIS — M25.562 LEFT MEDIAL KNEE PAIN: ICD-10-CM

## 2019-05-14 DIAGNOSIS — M25.511 ACUTE PAIN OF RIGHT SHOULDER: ICD-10-CM

## 2019-05-14 DIAGNOSIS — M25.511 ACUTE PAIN OF RIGHT SHOULDER: Primary | ICD-10-CM

## 2019-05-14 DIAGNOSIS — M75.22 BICEPS TENDONITIS ON LEFT: ICD-10-CM

## 2019-05-14 DIAGNOSIS — F41.9 ANXIETY: ICD-10-CM

## 2019-05-14 PROCEDURE — 99213 OFFICE O/P EST LOW 20 MIN: CPT | Performed by: NURSE PRACTITIONER

## 2019-05-14 RX ORDER — DICLOFENAC SODIUM 75 MG/1
75 TABLET, DELAYED RELEASE ORAL 2 TIMES DAILY
Qty: 60 TABLET | Refills: 1 | Status: SHIPPED | OUTPATIENT
Start: 2019-05-14 | End: 2019-09-13

## 2019-05-14 NOTE — PROGRESS NOTES
Subjective   Ritu Hanson is a 64 y.o. female.     Chief Complaint   Patient presents with   • Shoulder Pain     no known injury      HPI patient is new to me.  She is here for her right shoulder pain that began about 4 to 5 days ago.  She went to the urgent care and had an x-ray which was normal and was given diclofenac to take orally.  She is also told to rest and ice her right shoulder.  Initially this improved her right shoulder pain, she was off work from Friday through Sunday.  On Monday she returned to work and noticed a return of the shoulder pain.  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 Rest improves pain and is not sure if diclofenac is helpful.     Yesterday when she returned home from work she also noticed acute onset of left medial knee pain.  She has never had this before.  She was not able to walk on her knee.  Her  found an old walker which helped her to keep weight off of her knee and improved her pain.  When she woke up this morning it was gone.  She does not know what made her pain worse or what made it better other than rest.  She has been taking the diclofenac for her right shoulder pain.  She denies any associated left knee swelling, instability, pain along the joint line.  She has never had this sort of knee pain before.  She denies any history is known of arthritis.    She is leaving to go on vacation this Friday and will be working for the next 2 days until she goes on vacation.  Then she will be off for about 10 days.  She was anxious about going on vacation if something terrible was wrong with her.  She plans to just rest on vacation.    Social History     Tobacco Use   • Smoking status: Former Smoker     Packs/day: 0.50     Years: 30.00     Pack years: 15.00     Last attempt to quit: 2009     Years since quitting: 10.3   • Smokeless tobacco: Never Used   Substance Use Topics   • Alcohol use: No   • Drug  "use: No       The following portions of the patient's history were reviewed and updated as appropriate: allergies, current medications, past family history, past medical history, past social history, past surgical history and problem list.    Review of Systems   Constitutional: Negative for chills and fever.   Respiratory: Negative for cough and shortness of breath.    Cardiovascular: Negative for chest pain and palpitations.   Gastrointestinal: Negative for abdominal pain, diarrhea, nausea and vomiting.   Musculoskeletal: Positive for back pain (Chronic back pain). Negative for joint swelling.   Skin: Negative for rash and wound.       Objective   Blood pressure 140/88, pulse 70, temperature 98.4 °F (36.9 °C), resp. rate 20, height 157.5 cm (62\"), weight 65.3 kg (144 lb), SpO2 100 %, not currently breastfeeding.    Physical Exam   Constitutional: She is oriented to person, place, and time. She appears well-developed and well-nourished. No distress.   HENT:   Head: Normocephalic and atraumatic.   Mouth/Throat: Oropharynx is clear and moist.   Eyes: Conjunctivae are normal. Right eye exhibits no discharge. Left eye exhibits no discharge.   Cardiovascular: Normal rate, regular rhythm and normal heart sounds.   Pulmonary/Chest: Effort normal and breath sounds normal.   Abdominal: Soft. Bowel sounds are normal. There is no tenderness.   Musculoskeletal: She exhibits no deformity.   Gait smooth and steady  Right shoulder with full ROM abduction and adduction. Some mild pain with extension to 180 degrees abduction.  Negative for pain with  internal or external rotation.  Mildly TTP anterior shoulder    Left knee without crepitus, instability.  FROM, negative varus, valgus.  Points to medial knee as location of pain, non tender to palpation currently.    Neurological: She is alert and oriented to person, place, and time.   Skin: Skin is warm and dry. She is not diaphoretic.   Psychiatric: She has a normal mood and affect. "   Nursing note and vitals reviewed.      Assessment   Problem List Items Addressed This Visit     None      Visit Diagnoses     Acute pain of right shoulder    -  Primary    Left medial knee pain               Procedures           Impression and Plan:  Diclofenac continued with food. If she is not improved when returning from vacation I want her to call and let me know and I will order PT and/or ortho referral.  We discussed body mechanics and need to rest shoulder as well as expected duration of pain. Topicals may also be helpful such as biofreeze which she already has at home for chronic back pain.   Right shoulder pain:  Most likely has right bicep tendonitis which may be secondary to impingement.  Does not appear to be biceps rupture.  Left knee pain:  Currently resolved.  I think it is most likely due to compensating for right shoulder pain with lifting and pulling caused strain.      UC note and XR report reviewed.     There are no preventive care reminders to display for this patient.         EMR Dragon/Transcription disclaimer:   Much of this encounter note is an electronic transcription/translation of spoken language to printed text. The electronic translation of spoken language may permit erroneous, or at times, nonsensical words or phrases to be inadvertently transcribed; Although I have reviewed the note for such errors, some may still exist.

## 2019-05-14 NOTE — PATIENT INSTRUCTIONS
Shoulder Pain  Many things can cause shoulder pain, including:  · An injury.  · Moving the arm in the same way again and again (overuse).  · Joint pain (arthritis).    Follow these instructions at home:  Take these actions to help with your pain:  · Squeeze a soft ball or a foam pad as much as you can. This helps to prevent swelling. It also makes the arm stronger.  · Take over-the-counter and prescription medicines only as told by your doctor.  · If told, put ice on the area:  ? Put ice in a plastic bag.  ? Place a towel between your skin and the bag.  ? Leave the ice on for 20 minutes, 2-3 times per day. Stop putting on ice if it does not help with the pain.  · If you were given a shoulder sling or immobilizer:  ? Wear it as told.  ? Remove it to shower or bathe.  ? Move your arm as little as possible.  ? Keep your hand moving. This helps prevent swelling.    Contact a doctor if:  · Your pain gets worse.  · Medicine does not help your pain.  · You have new pain in your arm, hand, or fingers.  Get help right away if:  · Your arm, hand, or fingers:  ? Tingle.  ? Are numb.  ? Are swollen.  ? Are painful.  ? Turn white or blue.  This information is not intended to replace advice given to you by your health care provider. Make sure you discuss any questions you have with your health care provider.  Document Released: 06/05/2009 Document Revised: 08/13/2017 Document Reviewed: 04/11/2016  [employer] Interactive Patient Education © 2019 [employer]

## 2019-05-15 ENCOUNTER — HOSPITAL ENCOUNTER (EMERGENCY)
Facility: HOSPITAL | Age: 64
Discharge: HOME | End: 2019-05-15
Attending: EMERGENCY MEDICINE | Admitting: EMERGENCY MEDICINE

## 2019-05-15 ENCOUNTER — APPOINTMENT (OUTPATIENT)
Dept: GENERAL RADIOLOGY | Facility: HOSPITAL | Age: 64
End: 2019-05-15

## 2019-05-15 VITALS
HEART RATE: 103 BPM | BODY MASS INDEX: 26.68 KG/M2 | HEIGHT: 62 IN | DIASTOLIC BLOOD PRESSURE: 95 MMHG | RESPIRATION RATE: 16 BRPM | WEIGHT: 145 LBS | SYSTOLIC BLOOD PRESSURE: 143 MMHG | OXYGEN SATURATION: 99 % | TEMPERATURE: 98.2 F

## 2019-05-15 DIAGNOSIS — M25.562 ACUTE PAIN OF LEFT KNEE: Primary | ICD-10-CM

## 2019-05-15 LAB
ALBUMIN SERPL-MCNC: 3.8 G/DL (ref 3.5–5.2)
ALBUMIN/GLOB SERPL: 1.2 G/DL
ALP SERPL-CCNC: 62 U/L (ref 39–117)
ALT SERPL W P-5'-P-CCNC: 10 U/L (ref 1–33)
ANION GAP SERPL CALCULATED.3IONS-SCNC: 9.7 MMOL/L
AST SERPL-CCNC: 13 U/L (ref 1–32)
BASOPHILS # BLD AUTO: 0.02 10*3/MM3 (ref 0–0.2)
BASOPHILS NFR BLD AUTO: 0.2 % (ref 0–1.5)
BILIRUB SERPL-MCNC: 0.3 MG/DL (ref 0.2–1.2)
BUN BLD-MCNC: 10 MG/DL (ref 8–23)
BUN/CREAT SERPL: 13.7 (ref 7–25)
CALCIUM SPEC-SCNC: 9.5 MG/DL (ref 8.6–10.5)
CHLORIDE SERPL-SCNC: 102 MMOL/L (ref 98–107)
CO2 SERPL-SCNC: 25.3 MMOL/L (ref 22–29)
CREAT BLD-MCNC: 0.73 MG/DL (ref 0.57–1)
CRP SERPL-MCNC: 2.19 MG/DL (ref 0–0.5)
D DIMER PPP FEU-MCNC: 0.38 MCGFEU/ML (ref 0–0.49)
DEPRECATED RDW RBC AUTO: 42.9 FL (ref 37–54)
EOSINOPHIL # BLD AUTO: 0.09 10*3/MM3 (ref 0–0.4)
EOSINOPHIL NFR BLD AUTO: 1 % (ref 0.3–6.2)
ERYTHROCYTE [DISTWIDTH] IN BLOOD BY AUTOMATED COUNT: 13.6 % (ref 12.3–15.4)
ERYTHROCYTE [SEDIMENTATION RATE] IN BLOOD: 18 MM/HR (ref 0–30)
GFR SERPL CREATININE-BSD FRML MDRD: 81 ML/MIN/1.73
GLOBULIN UR ELPH-MCNC: 3.2 GM/DL
GLUCOSE BLD-MCNC: 91 MG/DL (ref 65–99)
HCT VFR BLD AUTO: 36.9 % (ref 34–46.6)
HGB BLD-MCNC: 11.5 G/DL (ref 12–15.9)
IMM GRANULOCYTES # BLD AUTO: 0.04 10*3/MM3 (ref 0–0.05)
IMM GRANULOCYTES NFR BLD AUTO: 0.5 % (ref 0–0.5)
LYMPHOCYTES # BLD AUTO: 1.51 10*3/MM3 (ref 0.7–3.1)
LYMPHOCYTES NFR BLD AUTO: 17.5 % (ref 19.6–45.3)
MCH RBC QN AUTO: 27.1 PG (ref 26.6–33)
MCHC RBC AUTO-ENTMCNC: 31.2 G/DL (ref 31.5–35.7)
MCV RBC AUTO: 86.8 FL (ref 79–97)
MONOCYTES # BLD AUTO: 0.59 10*3/MM3 (ref 0.1–0.9)
MONOCYTES NFR BLD AUTO: 6.9 % (ref 5–12)
NEUTROPHILS # BLD AUTO: 6.36 10*3/MM3 (ref 1.7–7)
NEUTROPHILS NFR BLD AUTO: 73.9 % (ref 42.7–76)
NRBC BLD AUTO-RTO: 0 /100 WBC (ref 0–0.2)
PLATELET # BLD AUTO: 357 10*3/MM3 (ref 140–450)
PMV BLD AUTO: 10.2 FL (ref 6–12)
POTASSIUM BLD-SCNC: 4.4 MMOL/L (ref 3.5–5.2)
PROT SERPL-MCNC: 7 G/DL (ref 6–8.5)
RBC # BLD AUTO: 4.25 10*6/MM3 (ref 3.77–5.28)
SODIUM BLD-SCNC: 137 MMOL/L (ref 136–145)
URATE SERPL-MCNC: 2.3 MG/DL (ref 2.4–5.7)
WBC NRBC COR # BLD: 8.61 10*3/MM3 (ref 3.4–10.8)

## 2019-05-15 PROCEDURE — 99283 EMERGENCY DEPT VISIT LOW MDM: CPT

## 2019-05-15 PROCEDURE — 86140 C-REACTIVE PROTEIN: CPT | Performed by: EMERGENCY MEDICINE

## 2019-05-15 PROCEDURE — 85379 FIBRIN DEGRADATION QUANT: CPT | Performed by: EMERGENCY MEDICINE

## 2019-05-15 PROCEDURE — 84550 ASSAY OF BLOOD/URIC ACID: CPT | Performed by: EMERGENCY MEDICINE

## 2019-05-15 PROCEDURE — 36415 COLL VENOUS BLD VENIPUNCTURE: CPT

## 2019-05-15 PROCEDURE — 85025 COMPLETE CBC W/AUTO DIFF WBC: CPT | Performed by: EMERGENCY MEDICINE

## 2019-05-15 PROCEDURE — 85652 RBC SED RATE AUTOMATED: CPT | Performed by: EMERGENCY MEDICINE

## 2019-05-15 PROCEDURE — 73560 X-RAY EXAM OF KNEE 1 OR 2: CPT

## 2019-05-15 PROCEDURE — 80053 COMPREHEN METABOLIC PANEL: CPT | Performed by: EMERGENCY MEDICINE

## 2019-05-15 RX ORDER — ALPRAZOLAM 0.25 MG/1
TABLET ORAL
Qty: 36 TABLET | Refills: 0 | Status: SHIPPED | OUTPATIENT
Start: 2019-05-15 | End: 2019-07-15 | Stop reason: SDUPTHER

## 2019-05-15 RX ORDER — HYDROCODONE BITARTRATE AND ACETAMINOPHEN 5; 325 MG/1; MG/1
1 TABLET ORAL EVERY 6 HOURS PRN
Qty: 20 TABLET | Refills: 0 | Status: SHIPPED | OUTPATIENT
Start: 2019-05-15 | End: 2019-08-02

## 2019-05-28 ENCOUNTER — TRANSCRIBE ORDERS (OUTPATIENT)
Dept: ADMINISTRATIVE | Facility: HOSPITAL | Age: 64
End: 2019-05-28

## 2019-05-28 DIAGNOSIS — Z12.39 BREAST SCREENING: Primary | ICD-10-CM

## 2019-06-05 ENCOUNTER — OFFICE VISIT (OUTPATIENT)
Dept: ORTHOPEDIC SURGERY | Facility: CLINIC | Age: 64
End: 2019-06-05

## 2019-06-05 VITALS — WEIGHT: 145 LBS | TEMPERATURE: 98.2 F | HEIGHT: 62 IN | BODY MASS INDEX: 26.68 KG/M2

## 2019-06-05 DIAGNOSIS — M75.21 BICEPS TENDINITIS, RIGHT: Primary | ICD-10-CM

## 2019-06-05 DIAGNOSIS — M19.011 ARTHRITIS OF RIGHT ACROMIOCLAVICULAR JOINT: ICD-10-CM

## 2019-06-05 PROCEDURE — 20550 NJX 1 TENDON SHEATH/LIGAMENT: CPT | Performed by: NURSE PRACTITIONER

## 2019-06-05 PROCEDURE — 99203 OFFICE O/P NEW LOW 30 MIN: CPT | Performed by: NURSE PRACTITIONER

## 2019-06-05 RX ADMIN — LIDOCAINE HYDROCHLORIDE 1 ML: 10 INJECTION, SOLUTION EPIDURAL; INFILTRATION; INTRACAUDAL; PERINEURAL at 10:05

## 2019-06-05 RX ADMIN — METHYLPREDNISOLONE ACETATE 80 MG: 80 INJECTION, SUSPENSION INTRA-ARTICULAR; INTRALESIONAL; INTRAMUSCULAR; SOFT TISSUE at 10:05

## 2019-06-05 NOTE — PROGRESS NOTES
Patient: Ritu Hanson    YOB: 1955    Medical Record Number: [id number]    Chief Complaints:  Right upper arm pain    History of Present Illness:     64 y.o. female patient who presents for evaluation of right upper arm pain.  Reports pain began 1 month ago.  Denies injury.  She tells me on [place] she was, playing with a child, throwing a nerve ball.  Pain began approximately 3 days later.  Localizes pain to the upper arm, biceps area.  Describes the pain as severe, intermittent, and crushing with movement and reaching overhead.  Denies pain at rest.  Ice, heat, and Biofreeze all provide temporary relief.  She has tried oral diclofenac without improvement. Denies associated symptoms.     Allergies:   Allergies   Allergen Reactions   • Codeine    • Prednisone      Home Medications:    Current Outpatient Medications:   •  ALPRAZolam (XANAX) 0.25 MG tablet, take 1 tablet by mouth once daily if needed for anxiety, Disp: 36 tablet, Rfl: 0  •  cholecalciferol (VITAMIN D3) 1000 units tablet, Take 2,000 Units by mouth Daily., Disp: , Rfl:   •  diclofenac (VOLTAREN) 75 MG EC tablet, Take 1 tablet by mouth 2 (Two) Times a Day., Disp: 60 tablet, Rfl: 1  •  estradiol (MINIVELLE, VIVELLE-DOT) 0.1 MG/24HR patch, Place 1 patch on the skin 2 (Two) Times a Week., Disp: , Rfl:   •  HYDROcodone-acetaminophen (NORCO) 5-325 MG per tablet, Take 1 tablet by mouth Every 6 (Six) Hours As Needed for Moderate Pain ., Disp: 20 tablet, Rfl: 0  •  pantoprazole (PROTONIX) 40 MG EC tablet, Take 1 tablet by mouth Daily., Disp: 90 tablet, Rfl: 3    Past Medical History:   Diagnosis Date   • Anxiety    • Bladder incontinence    • Colon polyps 01/2015    HP & TA   • Dizziness    • GERD (gastroesophageal reflux disease)    • PAC (premature atrial contraction)    • Spinal headache     NO FAMILY HX   • Tachycardia        Past Surgical History:   Procedure Laterality Date   • BLADDER SLING MODIFIED, ANTERIOR AND POSTERIOR VAGINAL  REPAIR  2007,2011   • BREAST BIOPSY     • COLONOSCOPY  01/16/2015    TA w/low grade dysplasia HP x 3   • COLONOSCOPY N/A 5/6/2016    IH, multiple hyperplastic polyps, otherwise normal exam   • ENDOSCOPY  01/16/2015    erythematous mucosa in stomach   • HYSTERECTOMY     • LAPAROSCOPIC CHOLECYSTECTOMY  2009   • OOPHORECTOMY     • UPPER GASTROINTESTINAL ENDOSCOPY  11/26/2012    erythematous mucosa in stomach   • UPPER GASTROINTESTINAL ENDOSCOPY  06/11/2012    monoilial esophagitis, erythema       Social History     Occupational History   • 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   Tobacco Use   • Smoking status: Former Smoker     Packs/day: 0.50     Years: 30.00     Pack years: 15.00     Last attempt to quit: 2009     Years since quitting: 10.4   • Smokeless tobacco: Never Used   Substance and Sexual Activity   • Alcohol use: No   • Drug use: No   • Sexual activity: Defer      Social History     Social History Narrative   • Not on file       Family History   Problem Relation Age of Onset   • Diabetes Mother    • Hypertension Mother    • COPD Mother    • COPD Father    • Colon cancer Maternal Grandfather    • No Known Problems Sister    • No Known Problems Brother    • No Known Problems Sister    • No Known Problems Sister      Review of Systems:      Constitutional: Denies fever, shaking or chills   Eyes: Denies change in visual acuity   HEENT: Denies nasal congestion or sore throat   Respiratory: Denies cough or shortness of breath   Cardiovascular: Denies chest pain or edema  Endocrine: Denies tremors, palpitations, intolerance of heat or cold, polyuria, polydipsia.  GI: Denies abdominal pain, nausea, vomiting, bloody stools or diarrhea  : Denies frequency, urgency, incontinence, retention, or nocturia.  Musculoskeletal: Denies numbness, tingling or loss of motor function except as above  Integument: Denies rash, lesion or ulceration   Neurologic: Denies headache or focal weakness, deficits  Heme: Denies spontaneous  "or excessive bleeding, epistaxis, hematuria, melena, fatigue, enlarged or tender lymph nodes.      All other pertinent positives and negatives as noted above in HPI.    Physical Exam: 64 y.o. female    Vitals:    06/05/19 0942   Temp: 98.2 °F (36.8 °C)   Weight: 65.8 kg (145 lb)   Height: 157.5 cm (62\")     General:  Patient is awake and alert.  Appears in no acute distress or discomfort.    Psych:  Affect and demeanor are appropriate.    Eyes:  Conjunctiva and sclera appear grossly normal.  Eyes track well and EOM seem to be intact.    Ears:  No gross abnormalities.  Hearing adequate for the exam.    Cardiovascular:  Regular rate and rhythm.    Lungs:  Good chest expansion.  Breathing unlabored.    Lymph:  No palpable masses or adenopathy in the affected extremity    Extremities:  Right shoulder is examined.  Skin is benign.  No gross abnormalities on inspection including any atrophy, swellings, or masses.  No palpable masses or adenopathy.  Focal tenderness noted over biceps groove.  Full shoulder motion.  No evident instability or apprehension.  Positive Speeds and Yergasons maneuver which reproduces the patient's typical pain.  Good strength in the rotator cuff, deltoid, biceps, triceps, and .  Intact sensation throughout the arm.  Brisk cap refill.  Palpable radial pulse.    Radiology:   Dr. Nunez and I reviewed the x-rays together.  Outside AP & lateral xrays are reviewed to evaluate patient's complaint.  The x-rays show no obvious mild acromioclavicular joint degeneration.  Otherwise, no acute abnormalities, lesions, masses, significant degenerative changes, or other concerning findings.  The acromiohumeral interval is normal.  Glenoid version appears normal as well.    Assessment/Plan:  1. Right biceps tendinitis  2.  Acromioclavicular joint arthritis    I have recommended that we start with a conservative approach. With regards to conservative options, we discussed appropriate activity modifications, " icing as needed, anti-inflammatories, and injections.  The risks, benefits and alternatives to the injection were thoroughly discussed.  The patient consented.  I've given her a sample of topical Pennsaid to try. The administration instructions and risks of this medication were discussed.  If the Pennsaid helps, she will call me for a refill.  No physical therapy for now, but that certainly is an option if she responds well to the injection. If her pain does not resolve,  I have a very low threshold for ordering an MRI for further evaluation. Going forward, the patient will follow-up if the symptoms persist or recur.    JUSTIN Sanon    06/05/2019    CC to Kassi Fields MD      Biceps Tendon Sheath   Date/Time: 6/5/2019 10:05 AM  Consent given by: patient  Site marked: site marked  Timeout: Immediately prior to procedure a time out was called to verify the correct patient, procedure, equipment, support staff and site/side marked as required   Supporting Documentation  Indications: pain   Procedure Details  Location: Right Bicep Tendon Sheath   Preparation: Patient was prepped and draped in the usual sterile fashion  Needle size: 25 G  Approach: anterior  Medications administered: 1 mL lidocaine PF 1% 1 %; 80 mg methylPREDNISolone acetate 80 MG/ML  Patient tolerance: patient tolerated the procedure well with no immediate complications

## 2019-06-10 RX ORDER — LIDOCAINE HYDROCHLORIDE 10 MG/ML
1 INJECTION, SOLUTION EPIDURAL; INFILTRATION; INTRACAUDAL; PERINEURAL
Status: COMPLETED | OUTPATIENT
Start: 2019-06-05 | End: 2019-06-05

## 2019-06-10 RX ORDER — METHYLPREDNISOLONE ACETATE 80 MG/ML
80 INJECTION, SUSPENSION INTRA-ARTICULAR; INTRALESIONAL; INTRAMUSCULAR; SOFT TISSUE
Status: COMPLETED | OUTPATIENT
Start: 2019-06-05 | End: 2019-06-05

## 2019-06-11 DIAGNOSIS — F41.9 ANXIETY: ICD-10-CM

## 2019-06-11 RX ORDER — ALPRAZOLAM 0.25 MG/1
TABLET ORAL
Qty: 34 TABLET | Refills: 0 | OUTPATIENT
Start: 2019-06-11

## 2019-06-17 ENCOUNTER — HOSPITAL ENCOUNTER (OUTPATIENT)
Dept: MAMMOGRAPHY | Facility: HOSPITAL | Age: 64
Discharge: HOME | End: 2019-06-17
Admitting: OBSTETRICS & GYNECOLOGY

## 2019-06-17 DIAGNOSIS — Z12.39 BREAST SCREENING: ICD-10-CM

## 2019-06-17 PROCEDURE — 77067 SCR MAMMO BI INCL CAD: CPT

## 2019-06-17 PROCEDURE — 77063 BREAST TOMOSYNTHESIS BI: CPT

## 2019-06-28 ENCOUNTER — OFFICE VISIT (OUTPATIENT)
Dept: ORTHOPEDIC SURGERY | Facility: CLINIC | Age: 64
End: 2019-06-28

## 2019-06-28 VITALS — HEIGHT: 64 IN | WEIGHT: 144.8 LBS | TEMPERATURE: 98.3 F | BODY MASS INDEX: 24.72 KG/M2

## 2019-06-28 DIAGNOSIS — M25.562 LEFT KNEE PAIN, UNSPECIFIED CHRONICITY: Primary | ICD-10-CM

## 2019-06-28 PROCEDURE — 99213 OFFICE O/P EST LOW 20 MIN: CPT | Performed by: NURSE PRACTITIONER

## 2019-06-28 PROCEDURE — 20610 DRAIN/INJ JOINT/BURSA W/O US: CPT | Performed by: NURSE PRACTITIONER

## 2019-06-28 RX ADMIN — METHYLPREDNISOLONE ACETATE 80 MG: 80 INJECTION, SUSPENSION INTRA-ARTICULAR; INTRALESIONAL; INTRAMUSCULAR; SOFT TISSUE at 11:58

## 2019-06-28 NOTE — PROGRESS NOTES
Patient: Ritu Hanson    YOB: 1955    Medical Record Number: [id number]    Chief Complaints:  Left knee pain    History of Present Illness:     64 y.o. female patient who presents with a recent history of pain and dysfunction affecting the left knee.  Onset of pain began approximately 7 weeks ago.  She cannot recall a specific injury or precipitating event.  Localizes pain to the lateral aspect of her knee.  Describes her pain is moderate, intermittent, and aching.  Pain is worse with walking.  She wears a brace at work which seems to help.  She has also tried rest, ice, and heat with mild temporary relief.  She reports associated swelling.  Of note, her right shoulder has improved tremendously.     Allergies:   Allergies   Allergen Reactions   • Codeine    • Prednisone      Medications:   Home Medications:    Current Outpatient Medications:   •  ALPRAZolam (XANAX) 0.25 MG tablet, take 1 tablet by mouth once daily if needed for anxiety, Disp: 36 tablet, Rfl: 0  •  cholecalciferol (VITAMIN D3) 1000 units tablet, Take 2,000 Units by mouth Daily., Disp: , Rfl:   •  diclofenac (VOLTAREN) 75 MG EC tablet, Take 1 tablet by mouth 2 (Two) Times a Day., Disp: 60 tablet, Rfl: 1  •  estradiol (MINIVELLE, VIVELLE-DOT) 0.1 MG/24HR patch, Place 1 patch on the skin 2 (Two) Times a Week., Disp: , Rfl:   •  pantoprazole (PROTONIX) 40 MG EC tablet, Take 1 tablet by mouth Daily., Disp: 90 tablet, Rfl: 3  •  HYDROcodone-acetaminophen (NORCO) 5-325 MG per tablet, Take 1 tablet by mouth Every 6 (Six) Hours As Needed for Moderate Pain ., Disp: 20 tablet, Rfl: 0    Past Medical History:   Diagnosis Date   • Anxiety    • Bladder incontinence    • Colon polyps 01/2015    HP & TA   • Dizziness    • GERD (gastroesophageal reflux disease)    • PAC (premature atrial contraction)    • Spinal headache     NO FAMILY HX   • Tachycardia      Past Surgical History:   Procedure Laterality Date   • BLADDER SLING MODIFIED, ANTERIOR AND  POSTERIOR VAGINAL REPAIR  2007,2011   • BREAST BIOPSY     • COLONOSCOPY  01/16/2015    TA w/low grade dysplasia HP x 3   • COLONOSCOPY N/A 5/6/2016    IH, multiple hyperplastic polyps, otherwise normal exam   • ENDOSCOPY  01/16/2015    erythematous mucosa in stomach   • HYSTERECTOMY     • LAPAROSCOPIC CHOLECYSTECTOMY  2009   • OOPHORECTOMY     • UPPER GASTROINTESTINAL ENDOSCOPY  11/26/2012    erythematous mucosa in stomach   • UPPER GASTROINTESTINAL ENDOSCOPY  06/11/2012    monoilial esophagitis, erythema       Social History     Occupational History   • 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   Tobacco Use   • Smoking status: Former Smoker     Packs/day: 0.50     Years: 30.00     Pack years: 15.00     Last attempt to quit: 2009     Years since quitting: 10.4   • Smokeless tobacco: Never Used   Substance and Sexual Activity   • Alcohol use: No   • Drug use: No   • Sexual activity: Defer      Social History     Social History Narrative   • Not on file       Family History   Problem Relation Age of Onset   • Diabetes Mother    • Hypertension Mother    • COPD Mother    • COPD Father    • Colon cancer Maternal Grandfather    • No Known Problems Sister    • No Known Problems Brother    • No Known Problems Sister    • No Known Problems Sister      Review of Systems:      Constitutional: Denies fever, shaking or chills   Eyes: Denies change in visual acuity   HEENT: Denies nasal congestion or sore throat   Respiratory: Denies cough or shortness of breath   Cardiovascular: Denies chest pain or edema  Endocrine: Denies tremors, palpitations, intolerance of heat or cold, polyuria, polydipsia.  GI: Denies abdominal pain, nausea, vomiting, bloody stools or diarrhea  : Denies frequency, urgency, incontinence, retention, or nocturia.  Musculoskeletal: Denies numbness tingling or loss of motor function except as above  Integument: Denies rash, lesion or ulceration   Neurologic: Denies headache or focal weakness, deficits  Heme:  "Denies epistaxis, spontaneous or excessive bleeding, epistaxis, hematuria, melena, fatigue, enlarged or tender lymph nodes.      All other pertinent positives and negatives as noted above in HPI.    Physical Exam: 64 y.o. female  Vitals:    06/28/19 1152   Temp: 98.3 °F (36.8 °C)   TempSrc: Temporal   Weight: 65.7 kg (144 lb 12.8 oz)   Height: 161.3 cm (63.5\")       General:  Patient is awake and alert.  Appears in no acute distress or discomfort.    Psych:  Affect and demeanor are appropriate.    Eyes:  Conjunctiva and sclera appear grossly normal.  Eyes track well and EOM seem to be intact.    Ears:  No gross abnormalities.  Hearing adequate for the exam.    Cardiovascular:  Regular rate and rhythm.    Lungs:  Good chest expansion.  Breathing unlabored.    Spine:  Back appears grossly normal.  No palpable masses or adenopathy.  Good motion.  Straight leg raise and crossed straight leg raise manuever are both negative for lower leg and/or knee pain.    Left Lower Extremity:  Skin is benign.  No obvious gross abnormalities about the knee.  No palpable masses or adenopathy.  Mild effusion. Moderate to severe tenderness noted over lateral joint line.  Motion is to 125° of flexion, full extension.  Markedly positive lateral Ledy's for pain.  No instability.  Strength is well preserved including hip flexion, knee extension, ankle and toe plantarflexion, ankle inversion and eversion.  Good sensory function throughout the leg and foot.  Palpable pulses.  Gait is mildly antalgic.         Radiology:  Dr. Nunez and I reviewed the x-rays together.  Outside AP and lateral views of the left knee are reviewed to evaluate the patient's complaint.  No comparison films are immediately available.  The x-rays show no obvious acute abnormalities, lesions, masses, significant degenerative changes, or other concerning findings.    Assessment/Plan:  Left knee pain, possible meniscal tear    We discussed treatment options in detail " including the risks, benefits, and alternatives of conservative treatment versus surgical options.  Regarding conservative treatment, we discussed appropriate activity modifications, anti-inflammatories, injections, and physical therapy.  The patient has stated that she would like to proceed with an injection and physical therapy. I have entered the referral for physical therapy.    The risks, benefits, and alternatives to an injection were thoroughly discussed and the patient consented to proceed.  Going forward, I will release the patient to follow-up as needed.  I informed the patient that if the pain persist and/or recurs,  I would be happy to see her back.      Nicole Bradford, JUSTIN    06/28/2019    CC to Kassi Fields MD      Large Joint Arthrocentesis: L knee  Date/Time: 6/28/2019 11:58 AM  Consent given by: patient  Site marked: site marked  Timeout: Immediately prior to procedure a time out was called to verify the correct patient, procedure, equipment, support staff and site/side marked as required   Supporting Documentation  Indications: pain   Procedure Details  Location: knee - L knee  Preparation: Patient was prepped and draped in the usual sterile fashion  Needle gauge: 21 G.  Approach: anterolateral  Medications administered: 2 mL lidocaine (cardiac); 80 mg methylPREDNISolone acetate 80 MG/ML  Patient tolerance: patient tolerated the procedure well with no immediate complications

## 2019-06-30 RX ORDER — METHYLPREDNISOLONE ACETATE 80 MG/ML
80 INJECTION, SUSPENSION INTRA-ARTICULAR; INTRALESIONAL; INTRAMUSCULAR; SOFT TISSUE
Status: COMPLETED | OUTPATIENT
Start: 2019-06-28 | End: 2019-06-28

## 2019-07-05 ENCOUNTER — TELEPHONE (OUTPATIENT)
Dept: FAMILY MEDICINE CLINIC | Facility: CLINIC | Age: 64
End: 2019-07-05

## 2019-07-05 NOTE — TELEPHONE ENCOUNTER
Pt called stating kamila knightill having major problems with her knee swelling and wanted to com delmy sooner than the 26th when she originally made appt with  to get seen for this. Pt is going to schedule appt with viktor to be seen for this issue.

## 2019-07-08 ENCOUNTER — APPOINTMENT (OUTPATIENT)
Dept: LAB | Facility: HOSPITAL | Age: 64
End: 2019-07-08

## 2019-07-08 ENCOUNTER — HOSPITAL ENCOUNTER (OUTPATIENT)
Dept: GENERAL RADIOLOGY | Facility: HOSPITAL | Age: 64
End: 2019-07-08

## 2019-07-08 ENCOUNTER — HOSPITAL ENCOUNTER (OUTPATIENT)
Dept: GENERAL RADIOLOGY | Facility: HOSPITAL | Age: 64
Discharge: HOME | End: 2019-07-08
Admitting: NURSE PRACTITIONER

## 2019-07-08 ENCOUNTER — TELEPHONE (OUTPATIENT)
Dept: FAMILY MEDICINE CLINIC | Facility: CLINIC | Age: 64
End: 2019-07-08

## 2019-07-08 ENCOUNTER — OFFICE VISIT (OUTPATIENT)
Dept: FAMILY MEDICINE CLINIC | Facility: CLINIC | Age: 64
End: 2019-07-08

## 2019-07-08 VITALS
WEIGHT: 141 LBS | HEIGHT: 64 IN | RESPIRATION RATE: 18 BRPM | TEMPERATURE: 98 F | OXYGEN SATURATION: 99 % | HEART RATE: 105 BPM | DIASTOLIC BLOOD PRESSURE: 78 MMHG | SYSTOLIC BLOOD PRESSURE: 138 MMHG | BODY MASS INDEX: 24.07 KG/M2

## 2019-07-08 DIAGNOSIS — M25.561 PAIN IN BOTH KNEES, UNSPECIFIED CHRONICITY: ICD-10-CM

## 2019-07-08 DIAGNOSIS — M25.542 ARTHRALGIA OF BOTH HANDS: Primary | ICD-10-CM

## 2019-07-08 DIAGNOSIS — M25.562 PAIN IN BOTH KNEES, UNSPECIFIED CHRONICITY: ICD-10-CM

## 2019-07-08 DIAGNOSIS — M25.541 ARTHRALGIA OF BOTH HANDS: Primary | ICD-10-CM

## 2019-07-08 LAB
CHROMATIN AB SERPL-ACNC: 87.8 IU/ML (ref 0–14)
ERYTHROCYTE [SEDIMENTATION RATE] IN BLOOD: 16 MM/HR (ref 0–30)

## 2019-07-08 PROCEDURE — 73130 X-RAY EXAM OF HAND: CPT

## 2019-07-08 PROCEDURE — 86431 RHEUMATOID FACTOR QUANT: CPT | Performed by: NURSE PRACTITIONER

## 2019-07-08 PROCEDURE — 85651 RBC SED RATE NONAUTOMATED: CPT | Performed by: NURSE PRACTITIONER

## 2019-07-08 PROCEDURE — 36415 COLL VENOUS BLD VENIPUNCTURE: CPT | Performed by: NURSE PRACTITIONER

## 2019-07-08 PROCEDURE — 99214 OFFICE O/P EST MOD 30 MIN: CPT | Performed by: NURSE PRACTITIONER

## 2019-07-08 PROCEDURE — 86200 CCP ANTIBODY: CPT | Performed by: NURSE PRACTITIONER

## 2019-07-08 NOTE — PROGRESS NOTES
Subjective   Ritu Hanson is a 64 y.o. female.     Chief Complaint   Patient presents with   • Joint Swelling     hand/knee      HPI patient is here today for chronic left knee pain, acute right knee pain and swelling and bilateral joint pain and swelling of hands.  These are new problems to me.  I saw patient for right shoulder pain in the past.  This pain resolved after PT.     Chronic left knee pain: She has been followed by orthopedics and has been seen recently for left knee pain and given an injection.  Pain comes and goes and seems to be improved somewhat by wearing a left knee brace.  She has been taking diclofenac twice per day and does not feel like she gets much relief from it.  It makes it difficult to walk at work.  She reports that orthopedics does not seem to know what is causing her pain other than some mild arthritis in her knee which was seen on x-ray.  Pain and swelling seem to come and go.  Pain is mostly generalized but some focal pain over lateral knee.    Right knee pain: This is more an acute pain with some swelling associated.  She sometimes wears a brace on this knee to help her at work as well.  This pain makes it difficult to walk at work.  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  She is also noticed recently some mild swelling in her right ankle, however it is not painful at this point.  Pain is mostly located medial knee.    Bilateral finger joint pain and swelling: This is located in her PIP joints.  Pain and swelling began couple months ago and been worsening, but does seem to come and go.  She does not notice that it is worse in the morning.  She is stiff all day long.  Her right hand seems to be more affected than her left hand.    +FH RA, though patient has  never been diagnosed with RA.  She denies other autoimmune history in her PMH.     She loves her job but it is difficult some days to be able to walk due to pain.  For example this morning she had increased right  knee swelling and pain in addition to her left knee swelling and pain, when she got up.  She went work but was soon to hold to go home because she was having so much trouble walking due to her pain and swelling in her knees.  She wears good shoes to work but is willing to try other orthotics if they would help.  She reports that other than her left knee pain and swelling she felt pretty good over the weekend and was without much pain and swelling in her other joints. She has not noticed any thing that triggers her joint pain.  Nothing seems to improve her pain other than her left knee has been somewhat improved with injection and wearing any brace.    She denies any fevers, rashes, fatigue prior to her joint pain.  She does not know of any tick bites.  She has never been seen by rheumatology.    She has been taking the Voltaren tablets twice per day without much relief.  She has hydrocodone but does not like to take it as she is not able to work with it.  She also has diclofenac gel which she gets from orthopedics and does not feel like it has been very helpful either.    Social History     Tobacco Use   • Smoking status: Former Smoker     Packs/day: 0.50     Years: 30.00     Pack years: 15.00     Last attempt to quit: 2009     Years since quitting: 10.5   • Smokeless tobacco: Never Used   Substance Use Topics   • Alcohol use: No   • Drug use: No       The following portions of the patient's history were reviewed and updated as appropriate: allergies, current medications, past family history, past medical history, past social history, past surgical history and problem list.    Review of Systems   Constitutional: Negative for chills, fatigue and fever.   Respiratory: Negative for cough and shortness of breath.    Cardiovascular: Negative for chest pain and palpitations.   Gastrointestinal: Negative for abdominal pain, blood in stool, diarrhea, nausea and vomiting.   Musculoskeletal: Positive for gait problem (Due to  "joint pain and swelling in bilateral knees) and joint swelling.   Neurological: Negative for weakness and numbness.   Hematological: Bruises/bleeds easily (Recently noticed she has been bruising more easily).   Psychiatric/Behavioral: Negative for sleep disturbance.       Objective   Blood pressure 138/78, pulse 105, temperature 98 °F (36.7 °C), resp. rate 18, height 161.3 cm (63.5\"), weight 64 kg (141 lb), SpO2 99 %, not currently breastfeeding.    Physical Exam   Constitutional: She is oriented to person, place, and time. She appears well-developed and well-nourished. No distress.   HENT:   Head: Normocephalic and atraumatic.   Eyes: Conjunctivae are normal. Right eye exhibits no discharge. Left eye exhibits no discharge.   Cardiovascular: Normal rate and regular rhythm.   Pulmonary/Chest: Effort normal and breath sounds normal.   Abdominal: Soft. Bowel sounds are normal. There is no tenderness.   Musculoskeletal: She exhibits no deformity.   Gait is somewhat antalgic with first steps especially.  PIP joints of right hand are mildly swollen, and erythematous and mildly tender to palpation.  PIP joints of left hand are less swollen and not erythematous and not tender to palpation.  She does have decreased  strength due to joint swelling especially in her right hand.  Right knee is mildly swollen, no discrete fluid noted, not erythematous.  Right ankle is mildly swollen and erythematous, nontender to palpation.  She does have full range of motion to both knees with a little decrease in flexion due to pain.  No instability of her knees noted.   Neurological: She is alert and oriented to person, place, and time.   Skin: Skin is warm and dry. She is not diaphoretic.   Left posterior forearm with scattered petechial bruising noted, patient reports scratching her arm last night and then noticed bruising this morning   Psychiatric: She has a normal mood and affect.   Nursing note and vitals reviewed.      Assessment "   Problem List Items Addressed This Visit     None      Visit Diagnoses     Arthralgia of both hands    -  Primary    Relevant Orders    Cyclic Citrul Peptide Antibody, IgG / IgA    Rheumatoid Factor, Quant (Completed)    Sedimentation Rate (Completed)    XR Hand 3+ View Bilateral    Pain in both knees, unspecified chronicity        Relevant Orders    Cyclic Citrul Peptide Antibody, IgG / IgA    Rheumatoid Factor, Quant (Completed)    Sedimentation Rate (Completed)           Procedures           Impression and Plan: I have reviewed her lab work and it appears that she has not had a rheumatoid factor checked which I will check today especially due to her family history.  It appears she has had some elevated CRPs but normal sed rates in the past.  I reviewed her last couple orthopedic notes for her left knee pain.  She has had x-rays and a knee injection.  I will get x-rays of both her hands today.  This may be more helpful in diagnosis of RA.  Although her knee pain was first and not noted initially bilaterally which would be a more uncommon presentation of RA.  Her joint pain does seem to come and go intermittently and be associated with swollen painful joints with different joints being affected.  I am concerned about her diclofenac use and think her bruising is most likely related to chronic NSAIDs.  I will see if we can switch her to daily Zorvolex to see if this is more helpful with her pain.  I have cautioned her that if she is able to get the ZORVOLEX filled she should stop the twice a day diclofenac.  She most likely need a referral to rheumatology to see if we can get a diagnosis as well as a treatment plan so that she can continue to work as she loves her job.    Health Maintenance Due   Topic Date Due   • ANNUAL PHYSICAL  05/04/1960   • HEPATITIS C SCREENING  02/18/2016   • PAP SMEAR  02/18/2016              EMR Dragon/Transcription disclaimer:   Much of this encounter note is an electronic  transcription/translation of spoken language to printed text. The electronic translation of spoken language may permit erroneous, or at times, nonsensical words or phrases to be inadvertently transcribed; Although I have reviewed the note for such errors, some may still exist.

## 2019-07-08 NOTE — TELEPHONE ENCOUNTER
Thanks hair sahu-will you let the patient know that she should stop the diclofenac she is taking now when she starts this?  Thanks-TYESHA

## 2019-07-08 NOTE — TELEPHONE ENCOUNTER
Spoke with Rep at [facility], [place], he is processing the patients Zorvolex today, will reach out to the pt for additional information.     This is a pharmacy for mail order only in [place]. It is a new transition with this medication.     Medication should be to the patient within 24-48  hours, shipped direct to pt.

## 2019-07-09 DIAGNOSIS — M05.80 POLYARTHRITIS WITH POSITIVE RHEUMATOID FACTOR (HCC): ICD-10-CM

## 2019-07-09 DIAGNOSIS — M25.541 ARTHRALGIA OF BOTH HANDS: Primary | ICD-10-CM

## 2019-07-09 DIAGNOSIS — M25.542 ARTHRALGIA OF BOTH HANDS: Primary | ICD-10-CM

## 2019-07-09 DIAGNOSIS — M25.562 PAIN IN BOTH KNEES, UNSPECIFIED CHRONICITY: ICD-10-CM

## 2019-07-09 DIAGNOSIS — M25.561 PAIN IN BOTH KNEES, UNSPECIFIED CHRONICITY: ICD-10-CM

## 2019-07-11 LAB — CCP IGA+IGG SERPL IA-ACNC: >250 UNITS (ref 0–19)

## 2019-07-11 NOTE — PROGRESS NOTES
Spoke in detail with Patient about results, patient expressed understanding and will follow up as agreed.     Any pending Labs and/or Diagnostic procedures required have been ordered for future release.    Caroline ZUNIGA

## 2019-07-15 DIAGNOSIS — F41.9 ANXIETY: ICD-10-CM

## 2019-07-15 RX ORDER — ALPRAZOLAM 0.25 MG/1
TABLET ORAL
Qty: 36 TABLET | Refills: 0 | Status: SHIPPED | OUTPATIENT
Start: 2019-07-15 | End: 2019-09-13 | Stop reason: SDUPTHER

## 2019-07-17 ENCOUNTER — HOSPITAL ENCOUNTER (OUTPATIENT)
Dept: PHYSICAL THERAPY | Facility: HOSPITAL | Age: 64
Setting detail: THERAPIES SERIES
Discharge: HOME | End: 2019-07-17

## 2019-07-17 DIAGNOSIS — R26.89 ANTALGIC GAIT: ICD-10-CM

## 2019-07-17 DIAGNOSIS — M25.562 ACUTE PAIN OF LEFT KNEE: Primary | ICD-10-CM

## 2019-07-17 DIAGNOSIS — R29.898 WEAKNESS OF LEFT LOWER EXTREMITY: ICD-10-CM

## 2019-07-17 PROCEDURE — 97161 PT EVAL LOW COMPLEX 20 MIN: CPT

## 2019-07-17 PROCEDURE — 97110 THERAPEUTIC EXERCISES: CPT

## 2019-07-17 NOTE — THERAPY EVALUATION
Outpatient Physical Therapy Ortho Initial Evaluation  [facility]     Patient Name: Ritu Hanson  : 1955  MRN: [id number]  Today's Date: 2019      Visit Date: 2019    Patient Active Problem List   Diagnosis   • Pain in extremity   • Gastroparesis   • Anxiety   • Degeneration of lumbar intervertebral disc   • Annular tear of lumbar disc   • Other hyperlipidemia        Past Medical History:   Diagnosis Date   • Anxiety    • Bladder incontinence    • Colon polyps 2015    HP & TA   • Dizziness    • GERD (gastroesophageal reflux disease)    • PAC (premature atrial contraction)    • Spinal headache     NO FAMILY HX   • Tachycardia         Past Surgical History:   Procedure Laterality Date   • BLADDER SLING MODIFIED, ANTERIOR AND POSTERIOR VAGINAL REPAIR  ,   • BREAST BIOPSY     • COLONOSCOPY  2015    TA w/low grade dysplasia HP x 3   • COLONOSCOPY N/A 2016    IH, multiple hyperplastic polyps, otherwise normal exam   • ENDOSCOPY  2015    erythematous mucosa in stomach   • HYSTERECTOMY     • LAPAROSCOPIC CHOLECYSTECTOMY     • OOPHORECTOMY     • UPPER GASTROINTESTINAL ENDOSCOPY  2012    erythematous mucosa in stomach   • UPPER GASTROINTESTINAL ENDOSCOPY  2012    monoilial esophagitis, erythema       Visit Dx:     ICD-10-CM ICD-9-CM   1. Acute pain of left knee M25.562 719.46   2. Antalgic gait R26.89 781.2   3. Weakness of left lower extremity R29.898 729.89         Patient History     Row Name 19 1400             History    Chief Complaint  Pain  -JA      Type of Pain  Knee pain L  -JA      Date Current Problem(s) Began  19  -      Brief Description of Current Complaint  Woke up with the pain.  It started with R shoulder then hit the L knee, then R knee, thn R hand knuckles.  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  is going to rheumatologist in Aug () for workup for RA.   Worst first thing in the  morning or getting up after sitting for long period.  -JA      Hand Dominance  right-handed  -JA      Are you or can you be pregnant  No  -JA         Pain     Pain Location  Knee  -JA      Pain at Present  3  -JA      Pain at Best  2  -JA      Pain at Worst  3  -JA         Fall Risk Assessment    Any falls in the past year:  No  -JA         Services    Prior Rehab/Home Health Experiences  No  -JA         Daily Activities    Primary Language  English  -JA      How does patient learn best?  Listening  -JA      Does patient have problems with the following?  Anxiety  -JA      Barriers to learning  None  -JA      Recommended Referrals  Physical Therapy  -JA      Pt Participated in POC and Goals  Yes  -JA         Safety    Are you being hurt, hit, or frightened by anyone at home or in your life?  No  -JA      Are you being neglected by a caregiver  No  -JA        User Key  (r) = Recorded By, (t) = Taken By, (c) = Cosigned By    Initials Name Provider Type    Marian Mitchell, PT Physical Therapist          PT Ortho     Row Name 07/17/19 1400       Posture/Observations    Posture/Observations Comments  mild postural deficits: forward head, increased kyphosis, mild hip flexor tightnes (ant tilt)  -JA       Special Tests/Palpation    Special Tests/Palpation  Knee  -JA       Knee Palpation    Knee Palpation?  -- L lateral jt line, LCL  -JA       Patellar Accessory Motions    Patellar Accessory Motions Tested?  -- WNL  -JA       Knee (Tibiofemoral) Accessory Motions    Knee (Tibiofemoral) Accessory Motions Tested?  -- WNL  -JA       General ROM    GENERAL ROM COMMENTS  L AROM , PROM ; R AROM 5-122  -JA       MMT (Manual Muscle Testing)    Rt Lower Ext  Rt Hip Flexion;Rt Hip Extension;Rt Hip ABduction;Rt Hip ADduction;Rt Knee Extension;Rt Knee Flexion;Rt Ankle Plantarflexion;Rt Ankle Dorsiflexion;Comments  -JA    Lt Lower Ext  Lt Hip Flexion;Lt Hip Extension;Lt Hip ABduction;Lt Hip ADduction;Lt Knee Extension;Lt  Knee Flexion;Lt Ankle Plantarflexion;Lt Ankle Dorsiflexion;Comments  -JA       MMT Right Lower Ext    Rt Hip Flexion MMT, Gross Movement  (4+/5) good plus  -JA    Rt Hip ABduction MMT, Gross Movement  (4/5) good  -JA    Rt Knee Extension MMT, Gross Movement  (4/5) good  -JA    Rt Knee Flexion MMT, Gross Movement  (4+/5) good plus  -JA    Rt Ankle Dorsiflexion MMT, Gross Movement  (4+/5) good plus  -JA       MMT Left Lower Ext    Lt Hip Flexion MMT, Gross Movement  (3+/5) fair plus  -JA    Lt Hip ABduction MMT, Gross Movement  (3+/5) fair plus  -JA    Lt Knee Extension MMT, Gross Movement  (3+/5) fair plus  -JA    Lt Knee Flexion MMT, Gross Movement  (4-/5) good minus  -JA    Lt Ankle Dorsiflexion MMT, Gross Movement  (4/5) good  -JA       Gait/Stairs Assessment/Training    Comment (Gait/Stairs)  decreased stance on R, increased weeight shift, decresed heel strike to toe off, worse initially from standing up and improved slightly after 50' of 100' walk  -DEBO      User Key  (r) = Recorded By, (t) = Taken By, (c) = Cosigned By    Initials Name Provider Type    Marian Mitchell, PT Physical Therapist                      Therapy Education  Education Details: Dicussed: appropriate intensity, continue with ice at home but recommended lying flat with leg elevated above heart for 10 min a day to assist with edema reduction; rest when able.  Given: HEP, Symptoms/condition management, Pain management, Edema management  Program: New  How Provided: Verbal, Demonstration, Written  Provided to: Patient  Level of Understanding: Teach back education performed     PT OP Goals     Row Name 07/17/19 1500          PT Short Term Goals    STG Date to Achieve  08/07/19  -DEBO     STG 1  Pt will be independent with initial HEP.  -DEBO     STG 1 Progress  New  -DEBO     STG 2  Pt will demonstrate increased AROM L knee extension in sitting to 10 deg  -DEBO     STG 2 Progress  New  -DEBO        Long Term Goals    LTG Date to Achieve  08/28/19  -DEBO      LTG 1  Pt will be independent with advanced HEP for LE and core strength and knee ROM.  -DEBO     LTG 1 Progress  New  -JA     LTG 2  Pt will report decrease in peak pain with getting up in morning or after prolonged sitting to no more than 2/10.  -DEBO     LTG 2 Progress  New  -JA     LTG 3  Pt will demonstrate increased AROM L knee 7-125 to facilitate return to PLOF.  -DEBO     LTG 3 Progress  New  -JA     LTG 4  Pt will score 79% or better on KOS indicating decrease in perceived functional disability.  -DEBO     LTG 4 Progress  New  -DEBO        Time Calculation    PT Goal Re-Cert Due Date  10/17/19  -DEBO       User Key  (r) = Recorded By, (t) = Taken By, (c) = Cosigned By    Initials Name Provider Type    Marian Mitchell, PT Physical Therapist          PT Assessment/Plan     Row Name 07/17/19 1500          PT Assessment    Functional Limitations  Impaired gait;Limitation in home management;Limitations in community activities;Limitations in functional capacity and performance;Performance in leisure activities;Performance in self-care ADL;Performance in work activities  -DEBO     Impairments  Pain;Range of motion;Muscle strength;Gait;Endurance;Balance  -DEBO     Assessment Comments  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  Patient presents with decreased L knee ROM, decreased strength, mildly antalgic gait, and pain with transitional movements. Signs and symptoms are consistent with referring diagnosis.  Pertinent comorbidities and personal factors that may affect progress include, but are not limited to, possible rheumatoid arthritis (has rheumatology appt in late Aug).  This condition is evolving. Recommend skilled PT to address functional deficits. Thank you for this referral.  -DEBO     Please refer to paper survey for additional self-reported information  Yes  -DEBO     Rehab Potential  Good  -DEBO      "Patient/caregiver participated in establishment of treatment plan and goals  Yes  -JA     Patient would benefit from skilled therapy intervention  Yes  -JA        PT Plan    PT Frequency  1x/week  -JA     Predicted Duration of Therapy Intervention (Therapy Eval)  6-8 week  -JA     Planned CPT's?  PT EVAL LOW COMPLEXITY: 23758;PT THER PROC EA 15 MIN: 82418;PT THER ACT EA 15 MIN: 03930;PT MANUAL THERAPY EA 15 MIN: 34909;PT NEUROMUSC RE-EDUCATION EA 15 MIN: 96267;PT GAIT TRAINING EA 15 MIN: 39431;PT AQUATIC THERAPY EA 15 MIN: 12346;PT SELF CARE/HOME MGMT/TRAIN EA 15: 84518;PT HOT OR COLD PACK TREAT MCARE  -JA     PT Plan Comments  review initial HEP and assess respnse, add Nustep or bike if tolrerated,, progress strengthening if ready (avoid weight-bearing for now due to inflammatory proccess at knee.), add hamstring stretch.  -JA       User Key  (r) = Recorded By, (t) = Taken By, (c) = Cosigned By    Initials Name Provider Type    Marian Mitchell, PT Physical Therapist            Exercises     Row Name 07/17/19 1400             Subjective Comments    Subjective Comments  initial eval  -JA         Subjective Pain    Able to rate subjective pain?  yes  -JA      Pre-Treatment Pain Level  2  -JA         Total Minutes    69552 - PT Therapeutic Exercise Minutes  23  -JA         Exercise 1    Exercise Name 1  discussed approriate intensity level and had pt work on R isometrics to help L muscle \"learn\"  -JA         Exercise 2    Exercise Name 2  supin QUad set w/towel roll  -JA      Cueing 2  Verbal;Tactile  -JA      Reps 2  10  -JA      Time 2  5sec  -JA      Additional Comments  decrease intensity; difficulty recruiting L quad  -JA         Exercise 3    Exercise Name 3  glute sets in supine  -JA      Cueing 3  Verbal  -JA      Reps 3  10 alternate  -JA      Time 3  5sec  -JA      Additional Comments  difficulty recruiting L  -JA         Exercise 4    Exercise Name 4  supine hip ABD \"windshield wiper\"  -JA      Cueing " 4  Verbal;Tactile  -JA      Sets 4  2  -JA      Reps 4  5  -JA      Time 4  stay in small range  -JA      Additional Comments  L knee lateral pain  -JA         Exercise 5    Exercise Name 5  SLR   -JA      Cueing 5  Verbal;Tactile  -JA      Sets 5  2  -JA      Reps 5  5  -JA         Exercise 6    Exercise Name 6  seated knee flex w/towel on floor  -JA      Cueing 6  Verbal;Demo  -JA      Reps 6  10  -JA      Additional Comments  slow  -JA         Exercise 7    Exercise Name 7  LAQ  -JA      Cueing 7  Verbal;Demo  -JA      Sets 7  2  -JA      Reps 7  5  -JA      Time 7  5sec  -JA        User Key  (r) = Recorded By, (t) = Taken By, (c) = Cosigned By    Initials Name Provider Type    Marian Mitchell, PT Physical Therapist                        Outcome Measure Options: Knee Outcome Score- ADL  Knee Outcome Score  Knee Outcome Score Comments: 55/80; 69%      Time Calculation:     Start Time: 1405  Stop Time: 1450  Time Calculation (min): 45 min     Therapy Charges for Today     Code Description Service Date Service Provider Modifiers Qty    12647411620 HC PT THER PROC EA 15 MIN 7/17/2019 Marian Garland, PT GP 2    31718138029 HC PT EVAL LOW COMPLEXITY 1 7/17/2019 Marian Garland, PT GP 1          PT G-Codes  Outcome Measure Options: Knee Outcome Score- ADL         Marian Garland PT  7/17/2019

## 2019-07-25 ENCOUNTER — HOSPITAL ENCOUNTER (OUTPATIENT)
Dept: PHYSICAL THERAPY | Facility: HOSPITAL | Age: 64
Setting detail: THERAPIES SERIES
Discharge: HOME | End: 2019-07-25

## 2019-07-25 DIAGNOSIS — R29.898 WEAKNESS OF LEFT LOWER EXTREMITY: ICD-10-CM

## 2019-07-25 DIAGNOSIS — M25.562 ACUTE PAIN OF LEFT KNEE: Primary | ICD-10-CM

## 2019-07-25 DIAGNOSIS — R26.89 ANTALGIC GAIT: ICD-10-CM

## 2019-07-25 PROCEDURE — 97110 THERAPEUTIC EXERCISES: CPT

## 2019-07-25 NOTE — THERAPY TREATMENT NOTE
Outpatient Physical Therapy Ortho Treatment Note  [facility]     Patient Name: Ritu Hanson  : 1955  MRN: [id number]  Today's Date: 2019      Visit Date: 2019    Visit Dx:    ICD-10-CM ICD-9-CM   1. Acute pain of left knee M25.562 719.46   2. Antalgic gait R26.89 781.2   3. Weakness of left lower extremity R29.898 729.89       Patient Active Problem List   Diagnosis   • Pain in extremity   • Gastroparesis   • Anxiety   • Degeneration of lumbar intervertebral disc   • Annular tear of lumbar disc   • Other hyperlipidemia        Past Medical History:   Diagnosis Date   • Anxiety    • Bladder incontinence    • Colon polyps 2015    HP & TA   • Dizziness    • GERD (gastroesophageal reflux disease)    • PAC (premature atrial contraction)    • Spinal headache     NO FAMILY HX   • Tachycardia         Past Surgical History:   Procedure Laterality Date   • BLADDER SLING MODIFIED, ANTERIOR AND POSTERIOR VAGINAL REPAIR  ,   • BREAST BIOPSY     • COLONOSCOPY  2015    TA w/low grade dysplasia HP x 3   • COLONOSCOPY N/A 2016    IH, multiple hyperplastic polyps, otherwise normal exam   • ENDOSCOPY  2015    erythematous mucosa in stomach   • HYSTERECTOMY     • LAPAROSCOPIC CHOLECYSTECTOMY     • OOPHORECTOMY     • UPPER GASTROINTESTINAL ENDOSCOPY  2012    erythematous mucosa in stomach   • UPPER GASTROINTESTINAL ENDOSCOPY  2012    monoilial esophagitis, erythema       PT Ortho     Row Name 19 1500       Subjective Comments    Subjective Comments  Pt states that hse had no pain in knees prior to her birthday in May.  Multiple joint flare up with pain and swelling.  -SI       Subjective Pain    Able to rate subjective pain?  yes  -SI    Subjective Pain Comment  daily left knee pain at work and left MC joints hand pain  -SI       Posture/Observations    Observations  Edema;Erythema knuckles off hands left greater than right.  -SI    Posture/Observations Comments  " wears bilat knee supports at work  -SI      User Key  (r) = Recorded By, (t) = Taken By, (c) = Cosigned By    Initials Name Provider Type    Richa Odonnell PTA Physical Therapy Assistant                      PT Assessment/Plan     Row Name 07/25/19 1519          PT Assessment    Assessment Comments  Review of initial HEP and added Nu-step, HS and gastroc stretches.  Tolerated well.  Pt has referral to Rhuemaatologist on 8-27-19  -SI       User Key  (r) = Recorded By, (t) = Taken By, (c) = Cosigned By    Initials Name Provider Type    GAIL Richa Hi PTA Physical Therapy Assistant            Exercises     Row Name 07/25/19 1500             Subjective Comments    Subjective Comments  Pt states that hse had no pain in knees prior to her birthday in May.  Multiple joint flare up with pain and swelling.  -SI         Subjective Pain    Able to rate subjective pain?  yes  -SI      Subjective Pain Comment  daily left knee pain at work and left MC joints hand pain  -SI         Total Minutes    69636 - PT Therapeutic Exercise Minutes  45  -SI         Exercise 1    Exercise Name 1  discussed approriate intensity level and had pt work on R isometrics to help L muscle \"learn\"  -SI         Exercise 2    Exercise Name 2  supin QUad set w/towel roll  -SI      Cueing 2  Verbal;Tactile  -SI      Reps 2  10  -SI      Time 2  5sec  -SI         Exercise 3    Exercise Name 3  glute sets in supine  -SI      Cueing 3  Verbal  -SI      Reps 3  10 alternate  -SI      Time 3  5sec  -SI         Exercise 4    Exercise Name 4  supine hip ABD \"windshield wiper\"  -SI      Cueing 4  Verbal;Tactile  -SI      Sets 4  2  -SI      Reps 4  5  -SI      Time 4  stay in small range  -SI         Exercise 5    Exercise Name 5  SLR   -SI      Cueing 5  Verbal;Tactile  -SI      Sets 5  2  -SI      Reps 5  5  -SI         Exercise 6    Exercise Name 6  seated knee flex w/towel on floor  -SI      Cueing 6  Verbal;Demo  -SI      Reps 6  10  -SI         " Exercise 7    Exercise Name 7  LAQ  -SI      Cueing 7  Verbal;Demo  -SI      Sets 7  2  -SI      Reps 7  5  -SI      Time 7  5sec  -SI         Exercise 8    Exercise Name 8  HS stretch long sitting  -SI      Sets 8  1  -SI      Reps 8  3  -SI      Time 8  20  -SI      Additional Comments  both LE  -SI         Exercise 9    Exercise Name 9  Gastroc stretch with towel  -SI      Sets 9  3  -SI      Reps 9  20  -SI      Additional Comments  both LE  -SI         Exercise 10    Exercise Name 10  Nu-step   -SI      Time 10  5 min  -SI        User Key  (r) = Recorded By, (t) = Taken By, (c) = Cosigned By    Initials Name Provider Type    SI Richa Hi PTA Physical Therapy Assistant                           Therapy Education  Given: HEP, Symptoms/condition management, Edema management(No pillow under flexed knee to prevent flexion contracture.)  Program: Progressed  How Provided: Verbal, Demonstration, Written  Provided to: Patient  Level of Understanding: Teach back education performed              Time Calculation:   Start Time: 1445  Stop Time: 1530  Time Calculation (min): 45 min  Total Timed Code Minutes- PT: 45 minute(s)  Therapy Charges for Today     Code Description Service Date Service Provider Modifiers Qty    50494658964 HC PT THER PROC EA 15 MIN 7/25/2019 Richa Hi PTA GP 3                    Richa Hi PTA  7/25/2019

## 2019-07-31 ENCOUNTER — HOSPITAL ENCOUNTER (OUTPATIENT)
Dept: PHYSICAL THERAPY | Facility: HOSPITAL | Age: 64
Setting detail: THERAPIES SERIES
Discharge: HOME | End: 2019-07-31

## 2019-07-31 ENCOUNTER — TELEPHONE (OUTPATIENT)
Dept: FAMILY MEDICINE CLINIC | Facility: CLINIC | Age: 64
End: 2019-07-31

## 2019-07-31 DIAGNOSIS — R26.89 ANTALGIC GAIT: ICD-10-CM

## 2019-07-31 DIAGNOSIS — R29.898 WEAKNESS OF LEFT LOWER EXTREMITY: ICD-10-CM

## 2019-07-31 DIAGNOSIS — M25.562 ACUTE PAIN OF LEFT KNEE: Primary | ICD-10-CM

## 2019-07-31 PROCEDURE — 97110 THERAPEUTIC EXERCISES: CPT

## 2019-07-31 NOTE — TELEPHONE ENCOUNTER
"PLEASE HOLD MESSAGE FOR BRENDON:    pts appointment for the RA Doc is not until Aug 20. Pt states that the Zorvolex is not really doing any good for her pain, she is doing PT and trying to maintain, but is really needing something for her joint pain.     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    Mrs. Hanson is so sweet and easy to talk with, I actually fussed at her a bit for not calling us sooner and letting us know the medication wasn't helping. I know she's just trying to \"muttle through\".    Let me know what we can do for her. I did also  Let her know that you wouldn't be back until tomorrow, she said that's fine.   Caroline  "

## 2019-08-01 DIAGNOSIS — M05.80 POLYARTHRITIS WITH POSITIVE RHEUMATOID FACTOR (HCC): Primary | ICD-10-CM

## 2019-08-01 RX ORDER — TRAMADOL HYDROCHLORIDE 50 MG/1
50 TABLET ORAL EVERY 6 HOURS PRN
Qty: 40 TABLET | Refills: 0 | OUTPATIENT
Start: 2019-08-01 | End: 2019-08-02 | Stop reason: SDUPTHER

## 2019-08-02 DIAGNOSIS — M05.80 POLYARTHRITIS WITH POSITIVE RHEUMATOID FACTOR (HCC): ICD-10-CM

## 2019-08-02 RX ORDER — TRAMADOL HYDROCHLORIDE 50 MG/1
50 TABLET ORAL EVERY 6 HOURS PRN
Qty: 40 TABLET | Refills: 0 | OUTPATIENT
Start: 2019-08-02 | End: 2019-09-13

## 2019-08-02 NOTE — TELEPHONE ENCOUNTER
Had not previously called in, I had not heard from patient about her being able to take it until now. I reset to send in, if you could please.

## 2019-08-07 ENCOUNTER — APPOINTMENT (OUTPATIENT)
Dept: PHYSICAL THERAPY | Facility: HOSPITAL | Age: 64
End: 2019-08-07

## 2019-08-14 ENCOUNTER — HOSPITAL ENCOUNTER (OUTPATIENT)
Dept: PHYSICAL THERAPY | Facility: HOSPITAL | Age: 64
Setting detail: THERAPIES SERIES
Discharge: HOME | End: 2019-08-14

## 2019-08-14 DIAGNOSIS — R26.89 ANTALGIC GAIT: ICD-10-CM

## 2019-08-14 DIAGNOSIS — M25.562 ACUTE PAIN OF LEFT KNEE: Primary | ICD-10-CM

## 2019-08-14 DIAGNOSIS — R29.898 WEAKNESS OF LEFT LOWER EXTREMITY: ICD-10-CM

## 2019-08-14 PROCEDURE — 97110 THERAPEUTIC EXERCISES: CPT

## 2019-08-14 NOTE — THERAPY TREATMENT NOTE
Outpatient Physical Therapy Ortho Treatment Note  [facility]     Patient Name: Ritu Hanson  : 1955  MRN: [id number]  Today's Date: 2019      Visit Date: 2019    Visit Dx:    ICD-10-CM ICD-9-CM   1. Acute pain of left knee M25.562 719.46   2. Antalgic gait R26.89 781.2   3. Weakness of left lower extremity R29.898 729.89       Patient Active Problem List   Diagnosis   • Pain in extremity   • Gastroparesis   • Anxiety   • Degeneration of lumbar intervertebral disc   • Annular tear of lumbar disc   • Other hyperlipidemia        Past Medical History:   Diagnosis Date   • Anxiety    • Bladder incontinence    • Colon polyps 2015    HP & TA   • Dizziness    • GERD (gastroesophageal reflux disease)    • PAC (premature atrial contraction)    • Spinal headache     NO FAMILY HX   • Tachycardia         Past Surgical History:   Procedure Laterality Date   • BLADDER SLING MODIFIED, ANTERIOR AND POSTERIOR VAGINAL REPAIR  ,   • BREAST BIOPSY     • COLONOSCOPY  2015    TA w/low grade dysplasia HP x 3   • COLONOSCOPY N/A 2016    IH, multiple hyperplastic polyps, otherwise normal exam   • ENDOSCOPY  2015    erythematous mucosa in stomach   • HYSTERECTOMY     • LAPAROSCOPIC CHOLECYSTECTOMY     • OOPHORECTOMY     • UPPER GASTROINTESTINAL ENDOSCOPY  2012    erythematous mucosa in stomach   • UPPER GASTROINTESTINAL ENDOSCOPY  2012    monoilial esophagitis, erythema       PT Ortho     Row Name 19 1400       Subjective Comments    Subjective Comments  No left knee pain.. currently but daily knee pain  Chief complaint is bilat hand pain and swelling.  Consult with Rhumatolihist next week..  -SI       Subjective Pain    Able to rate subjective pain?  yes  -SI    Pre-Treatment Pain Level  0  -SI    Post-Treatment Pain Level  0  -SI       General ROM    GENERAL ROM COMMENTS  0 to113  -SI       Gait/Stairs Assessment/Training    Comment (Gait/Stairs)  mild antalgia  "after work.  -SI      User Key  (r) = Recorded By, (t) = Taken By, (c) = Cosigned By    Initials Name Provider Type    Richa Odonnell PTA Physical Therapy Assistant                      PT Assessment/Plan     Row Name 08/14/19 1423          PT Assessment    Assessment Comments  pt with acute arthritic flare up.  Acute daily hand pain, right shoulder pain, and left knee pain.  AROM left knee better with gentle ex.  -SI       User Key  (r) = Recorded By, (t) = Taken By, (c) = Cosigned By    Initials Name Provider Type    Richa Odonnell PTA Physical Therapy Assistant            Exercises     Row Name 08/14/19 1400             Subjective Comments    Subjective Comments  No left knee pain.. currently but daily knee pain  Chief complaint is bilat hand pain and swelling.  Consult with Rhumatolihist next week..  -SI         Subjective Pain    Able to rate subjective pain?  yes  -SI      Pre-Treatment Pain Level  0  -SI      Post-Treatment Pain Level  0  -SI         Total Minutes    44670 - PT Therapeutic Exercise Minutes  35  -SI         Exercise 1    Exercise Name 1  Nustep LE only  -SI      Time 1  5 min  -SI         Exercise 2    Exercise Name 2  supin QUad set w/towel roll  -SI      Cueing 2  Verbal;Tactile  -SI      Reps 2  10  -SI      Time 2  5sec  -SI         Exercise 3    Exercise Name 3  glute sets in supine  -SI      Cueing 3  Verbal  -SI      Reps 3  10 alternate  -SI      Time 3  5sec  -SI         Exercise 4    Exercise Name 4  supine hip ABD \"windshield wiper\"  -SI      Cueing 4  Verbal;Tactile  -SI      Sets 4  2  -SI      Reps 4  10  -SI      Time 4  stay in small range  -SI         Exercise 5    Exercise Name 5  SLR   -SI      Cueing 5  Verbal;Tactile  -SI      Sets 5  2  -SI      Reps 5  5  -SI         Exercise 6    Exercise Name 6  seated knee flex w/towel on floor  -SI      Cueing 6  Verbal;Demo  -SI      Reps 6  10  -SI         Exercise 7    Exercise Name 7  LAQ  -SI      Cueing 7  Verbal;Demo "  -SI      Sets 7  2  -SI      Reps 7  5  -SI      Time 7  5sec  -SI         Exercise 8    Exercise Name 8  HS stretch long sitting  -SI      Sets 8  1  -SI      Reps 8  3  -SI      Time 8  20  -SI         Exercise 9    Exercise Name 9  Gastroc stretch with towel  -SI      Sets 9  3  -SI      Reps 9  20  -SI         Exercise 10    Exercise Name 10  added HL hip add isometric  -SI      Reps 10  10  -SI         Exercise 11    Exercise Name 11  added HL alt hip abd w/tband  -SI      Sets 11  adonay, L, R  -SI      Reps 11  10 ea  -SI      Additional Comments  YTB  -SI        User Key  (r) = Recorded By, (t) = Taken By, (c) = Cosigned By    Initials Name Provider Type    Richa Odonnell PTA Physical Therapy Assistant                           Therapy Education  Given: HEP, Symptoms/condition management(ice, rest, energy conservation.)  Program: Progressed  How Provided: Verbal, Demonstration, Written  Provided to: Patient  Level of Understanding: Teach back education performed              Time Calculation:   Start Time: 1400  Stop Time: 1435  Time Calculation (min): 35 min  Total Timed Code Minutes- PT: 35 minute(s)  Therapy Charges for Today     Code Description Service Date Service Provider Modifiers Qty    01565795675 HC PT THER PROC EA 15 MIN 8/14/2019 Richa Hi PTA GP 2                    Richa Hi PTA  8/14/2019

## 2019-08-20 ENCOUNTER — TRANSCRIBE ORDERS (OUTPATIENT)
Dept: LAB | Facility: HOSPITAL | Age: 64
End: 2019-08-20

## 2019-08-20 ENCOUNTER — LAB (OUTPATIENT)
Dept: LAB | Facility: HOSPITAL | Age: 64
End: 2019-08-20

## 2019-08-20 DIAGNOSIS — M05.9 SEROPOSITIVE RHEUMATOID ARTHRITIS (HCC): ICD-10-CM

## 2019-08-20 DIAGNOSIS — M05.9 SEROPOSITIVE RHEUMATOID ARTHRITIS (HCC): Primary | ICD-10-CM

## 2019-08-20 LAB
ALBUMIN SERPL-MCNC: 4.5 G/DL (ref 3.5–5.2)
ALBUMIN/GLOB SERPL: 1.3 G/DL
ALP SERPL-CCNC: 98 U/L (ref 39–117)
ALT SERPL W P-5'-P-CCNC: 33 U/L (ref 1–33)
ANION GAP SERPL CALCULATED.3IONS-SCNC: 15.5 MMOL/L (ref 5–15)
AST SERPL-CCNC: 17 U/L (ref 1–32)
BASOPHILS # BLD AUTO: 0.05 10*3/MM3 (ref 0–0.2)
BASOPHILS NFR BLD AUTO: 0.5 % (ref 0–1.5)
BILIRUB SERPL-MCNC: 0.3 MG/DL (ref 0.2–1.2)
BUN BLD-MCNC: 13 MG/DL (ref 8–23)
BUN/CREAT SERPL: 24.5 (ref 7–25)
CALCIUM SPEC-SCNC: 9.8 MG/DL (ref 8.6–10.5)
CHLORIDE SERPL-SCNC: 96 MMOL/L (ref 98–107)
CO2 SERPL-SCNC: 25.5 MMOL/L (ref 22–29)
CREAT BLD-MCNC: 0.53 MG/DL (ref 0.57–1)
CRP SERPL-MCNC: 4.92 MG/DL (ref 0–0.5)
DEPRECATED RDW RBC AUTO: 42 FL (ref 37–54)
EOSINOPHIL # BLD AUTO: 0.08 10*3/MM3 (ref 0–0.4)
EOSINOPHIL NFR BLD AUTO: 0.8 % (ref 0.3–6.2)
ERYTHROCYTE [DISTWIDTH] IN BLOOD BY AUTOMATED COUNT: 13.8 % (ref 12.3–15.4)
GFR SERPL CREATININE-BSD FRML MDRD: 117 ML/MIN/1.73
GLOBULIN UR ELPH-MCNC: 3.4 GM/DL
GLUCOSE BLD-MCNC: 95 MG/DL (ref 65–99)
HAV IGM SERPL QL IA: NORMAL
HBV CORE IGM SERPL QL IA: NORMAL
HBV SURFACE AG SERPL QL IA: NORMAL
HCT VFR BLD AUTO: 37.8 % (ref 34–46.6)
HCV AB SER DONR QL: NORMAL
HGB BLD-MCNC: 11.5 G/DL (ref 12–15.9)
IMM GRANULOCYTES # BLD AUTO: 0.05 10*3/MM3 (ref 0–0.05)
IMM GRANULOCYTES NFR BLD AUTO: 0.5 % (ref 0–0.5)
LYMPHOCYTES # BLD AUTO: 1.19 10*3/MM3 (ref 0.7–3.1)
LYMPHOCYTES NFR BLD AUTO: 12.5 % (ref 19.6–45.3)
MCH RBC QN AUTO: 25.3 PG (ref 26.6–33)
MCHC RBC AUTO-ENTMCNC: 30.4 G/DL (ref 31.5–35.7)
MCV RBC AUTO: 83.3 FL (ref 79–97)
MONOCYTES # BLD AUTO: 0.52 10*3/MM3 (ref 0.1–0.9)
MONOCYTES NFR BLD AUTO: 5.5 % (ref 5–12)
NEUTROPHILS # BLD AUTO: 7.62 10*3/MM3 (ref 1.7–7)
NEUTROPHILS NFR BLD AUTO: 80.2 % (ref 42.7–76)
NRBC BLD AUTO-RTO: 0 /100 WBC (ref 0–0.2)
PLATELET # BLD AUTO: 554 10*3/MM3 (ref 140–450)
PMV BLD AUTO: 10.2 FL (ref 6–12)
POTASSIUM BLD-SCNC: 4 MMOL/L (ref 3.5–5.2)
PROT SERPL-MCNC: 7.9 G/DL (ref 6–8.5)
RBC # BLD AUTO: 4.54 10*6/MM3 (ref 3.77–5.28)
SODIUM BLD-SCNC: 137 MMOL/L (ref 136–145)
WBC NRBC COR # BLD: 9.51 10*3/MM3 (ref 3.4–10.8)

## 2019-08-20 PROCEDURE — 86200 CCP ANTIBODY: CPT

## 2019-08-20 PROCEDURE — 80053 COMPREHEN METABOLIC PANEL: CPT

## 2019-08-20 PROCEDURE — 80074 ACUTE HEPATITIS PANEL: CPT

## 2019-08-20 PROCEDURE — 36415 COLL VENOUS BLD VENIPUNCTURE: CPT

## 2019-08-20 PROCEDURE — 85025 COMPLETE CBC W/AUTO DIFF WBC: CPT

## 2019-08-20 PROCEDURE — 86140 C-REACTIVE PROTEIN: CPT

## 2019-08-21 ENCOUNTER — APPOINTMENT (OUTPATIENT)
Dept: PHYSICAL THERAPY | Facility: HOSPITAL | Age: 64
End: 2019-08-21

## 2019-08-21 ENCOUNTER — TELEPHONE (OUTPATIENT)
Dept: PHYSICAL THERAPY | Facility: HOSPITAL | Age: 64
End: 2019-08-21

## 2019-08-22 LAB — CCP IGA+IGG SERPL IA-ACNC: >250 UNITS (ref 0–19)

## 2019-08-28 ENCOUNTER — APPOINTMENT (OUTPATIENT)
Dept: PHYSICAL THERAPY | Facility: HOSPITAL | Age: 64
End: 2019-08-28

## 2019-08-30 DIAGNOSIS — F41.9 ANXIETY: ICD-10-CM

## 2019-08-30 RX ORDER — ALPRAZOLAM 0.25 MG/1
TABLET ORAL
Qty: 36 TABLET | Refills: 0 | OUTPATIENT
Start: 2019-08-30

## 2019-09-04 DIAGNOSIS — F41.9 ANXIETY: ICD-10-CM

## 2019-09-05 NOTE — TELEPHONE ENCOUNTER
Says it was just filled 8/23 so she is early. Please check a EULALIO. If she is out and needing more I definitely need to see her. She should schedule an appointment to see me for continuing this prescription.

## 2019-09-09 DIAGNOSIS — F41.9 ANXIETY: ICD-10-CM

## 2019-09-09 RX ORDER — ALPRAZOLAM 0.25 MG/1
TABLET ORAL
Qty: 36 TABLET | Refills: 0 | OUTPATIENT
Start: 2019-09-09

## 2019-09-09 NOTE — TELEPHONE ENCOUNTER
Please contact this patient after 2 pm at this number [phone]. Advise her that Donnie oliveira needs to be seen for continuing this rx.

## 2019-09-10 RX ORDER — ALPRAZOLAM 0.25 MG/1
TABLET ORAL
Qty: 36 TABLET | Refills: 0 | OUTPATIENT
Start: 2019-09-10

## 2019-09-13 ENCOUNTER — OFFICE VISIT (OUTPATIENT)
Dept: FAMILY MEDICINE CLINIC | Facility: CLINIC | Age: 64
End: 2019-09-13

## 2019-09-13 VITALS
DIASTOLIC BLOOD PRESSURE: 70 MMHG | RESPIRATION RATE: 14 BRPM | TEMPERATURE: 97.8 F | HEART RATE: 89 BPM | HEIGHT: 64 IN | OXYGEN SATURATION: 99 % | WEIGHT: 139.8 LBS | BODY MASS INDEX: 23.87 KG/M2 | SYSTOLIC BLOOD PRESSURE: 112 MMHG

## 2019-09-13 DIAGNOSIS — F41.9 ANXIETY: ICD-10-CM

## 2019-09-13 DIAGNOSIS — M05.79 RHEUMATOID ARTHRITIS INVOLVING MULTIPLE SITES WITH POSITIVE RHEUMATOID FACTOR (HCC): Primary | ICD-10-CM

## 2019-09-13 PROCEDURE — 99214 OFFICE O/P EST MOD 30 MIN: CPT | Performed by: FAMILY MEDICINE

## 2019-09-13 RX ORDER — PAROXETINE 10 MG/1
5 TABLET, FILM COATED ORAL NIGHTLY
Qty: 30 TABLET | Refills: 1 | Status: SHIPPED | OUTPATIENT
Start: 2019-09-13 | End: 2020-04-23

## 2019-09-13 RX ORDER — METHYLPREDNISOLONE 4 MG/1
TABLET ORAL
Refills: 0 | COMMUNITY
Start: 2019-08-21 | End: 2021-07-20 | Stop reason: SDUPTHER

## 2019-09-13 RX ORDER — ALPRAZOLAM 0.25 MG/1
0.25 TABLET ORAL DAILY PRN
Qty: 30 TABLET | Refills: 0 | Status: SHIPPED | OUTPATIENT
Start: 2019-09-13 | End: 2020-02-26

## 2019-09-13 RX ORDER — FOLIC ACID 1 MG/1
1000 TABLET ORAL DAILY
Refills: 0 | COMMUNITY
Start: 2019-08-20 | End: 2021-03-01

## 2019-09-13 NOTE — PROGRESS NOTES
Chief Complaint   Patient presents with   • Anxiety     consult on continuation of medication        Subjective   Ritu Hanson is a 64 y.o. female.     History of Present Illness   New Dx of RA  Seeing Dr. Ontiveros. She was started on methotrexate, folic acid and is taking methylprednisolone on a slow taper. First the shoulder than the knee and then her hands. Labs with this office and referred to rheum. Started on meds mid August. 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 Her colleagues have been very supportive, she took several weeks off away from work while undergoing the evaluation and starting medication. Says it is definitely better since starting the medication but she still has a lot of pain especially in the hands and in the right ankle. This makes a full day of work hard. She loves her job. Has follow up scheduled with rheum for re-evaluation on this medication regimen.   All of the stress of the pain and the diagnosis she has been using her alprazolam a little more often but only as needed.     Anxiety  Worsened related to above. She has been out of her alprazolam for a few days. She is needing the refill sooner than anticipated so she needs to be seen in the office. She understands that this is not the best class of medication to be on but it has really helped her.     The following portions of the patient's history were reviewed and updated as appropriate: allergies, current medications, past medical history, past social history and problem list.    Review of Systems   Respiratory: Negative.    Cardiovascular: Negative.    Gastrointestinal: Negative for abdominal pain.   Musculoskeletal: Positive for arthralgias and joint swelling.   Neurological: Positive for weakness.   this is the sense she gets from her joint pain and from her time away from work, working on getting stronger again moving much more at work.     Vitals:    09/13/19 1346   BP:  "112/70   BP Location: Left arm   Patient Position: Sitting   Cuff Size: Adult   Pulse: 89   Resp: 14   Temp: 97.8 °F (36.6 °C)   TempSrc: Oral   SpO2: 99%   Weight: 63.4 kg (139 lb 12.8 oz)   Height: 161.3 cm (63.5\")       Objective   Physical Exam   Constitutional: She is oriented to person, place, and time. She appears well-nourished. No distress.   Eyes: Conjunctivae are normal. No scleral icterus.   Pulmonary/Chest: Effort normal. No respiratory distress.   Musculoskeletal: She exhibits no edema or deformity.   Neurological: She is alert and oriented to person, place, and time.   Gait is slow to start but she moves fairly well, slowly and carefully.    Psychiatric: She has a normal mood and affect. Her behavior is normal.   Vitals reviewed.      Assessment/Plan   Ritu was seen today for anxiety.    Diagnoses and all orders for this visit:    Rheumatoid arthritis involving multiple sites with positive rheumatoid factor (CMS/HCC)    Anxiety  -     ALPRAZolam (XANAX) 0.25 MG tablet; Take 1 tablet by mouth Daily As Needed for Anxiety.    Other orders  -     PARoxetine (PAXIL) 10 MG tablet; Take 0.5 tablets by mouth Every Night.      Anxiety  Will refill her alprazolam. She is very interested in starting another medication, thought it might be able to wait while she is still addressing meds for the RA but she says she is ready to start now. We will do very low dose. To take 5 mg of paxil at night. Discussed that this may help with postmenopausal symptoms as well which seemed to be appealing to the pt.   We will have close follow up.     Reviewed note from 8/20/19 from rheum with Dr. Ontiveros.   She had several months of some isolated joint pain addressed with joint injections and PT with minimal or short lived relief until she was evaluated in our office by NP and had RF of 87 and ESR of 16. She has extensive FHx of RA.   Treatment was initiated by Dr. Ontiveros with methotrexate and bridge with prednisone. She has " improved, but still some trouble areas. Follow up at 6 weeks. Will request the other labs from that visit. CCP Ab etc.

## 2019-09-22 PROBLEM — M05.79 RHEUMATOID ARTHRITIS INVOLVING MULTIPLE SITES WITH POSITIVE RHEUMATOID FACTOR: Status: ACTIVE | Noted: 2019-09-22

## 2019-11-27 ENCOUNTER — TRANSCRIBE ORDERS (OUTPATIENT)
Dept: ADMINISTRATIVE | Facility: HOSPITAL | Age: 64
End: 2019-11-27

## 2019-11-27 ENCOUNTER — LAB (OUTPATIENT)
Dept: LAB | Facility: HOSPITAL | Age: 64
End: 2019-11-27

## 2019-11-27 DIAGNOSIS — Z79.899 ENCOUNTER FOR LONG-TERM (CURRENT) USE OF OTHER MEDICATIONS: Primary | ICD-10-CM

## 2019-11-27 DIAGNOSIS — M05.79 SEROPOSITIVE RHEUMATOID ARTHRITIS OF MULTIPLE SITES (HCC): ICD-10-CM

## 2019-11-27 DIAGNOSIS — Z79.899 ENCOUNTER FOR LONG-TERM (CURRENT) USE OF OTHER MEDICATIONS: ICD-10-CM

## 2019-11-27 LAB
ALBUMIN SERPL-MCNC: 4.5 G/DL (ref 3.5–5.2)
ALBUMIN/GLOB SERPL: 1.7 G/DL
ALP SERPL-CCNC: 52 U/L (ref 39–117)
ALT SERPL W P-5'-P-CCNC: 10 U/L (ref 1–33)
ANION GAP SERPL CALCULATED.3IONS-SCNC: 9.7 MMOL/L (ref 5–15)
AST SERPL-CCNC: 13 U/L (ref 1–32)
BILIRUB SERPL-MCNC: 0.3 MG/DL (ref 0.2–1.2)
BUN BLD-MCNC: 13 MG/DL (ref 8–23)
BUN/CREAT SERPL: 15.1 (ref 7–25)
CALCIUM SPEC-SCNC: 9.8 MG/DL (ref 8.6–10.5)
CHLORIDE SERPL-SCNC: 100 MMOL/L (ref 98–107)
CO2 SERPL-SCNC: 29.3 MMOL/L (ref 22–29)
CREAT BLD-MCNC: 0.86 MG/DL (ref 0.57–1)
CRP SERPL-MCNC: 0.1 MG/DL (ref 0–0.5)
DEPRECATED RDW RBC AUTO: 57.5 FL (ref 37–54)
ERYTHROCYTE [DISTWIDTH] IN BLOOD BY AUTOMATED COUNT: 19.1 % (ref 12.3–15.4)
ERYTHROCYTE [SEDIMENTATION RATE] IN BLOOD: 6 MM/HR (ref 0–30)
GFR SERPL CREATININE-BSD FRML MDRD: 67 ML/MIN/1.73
GLOBULIN UR ELPH-MCNC: 2.6 GM/DL
GLUCOSE BLD-MCNC: 87 MG/DL (ref 65–99)
HCT VFR BLD AUTO: 38.3 % (ref 34–46.6)
HGB BLD-MCNC: 12.4 G/DL (ref 12–15.9)
MCH RBC QN AUTO: 28.1 PG (ref 26.6–33)
MCHC RBC AUTO-ENTMCNC: 32.4 G/DL (ref 31.5–35.7)
MCV RBC AUTO: 86.8 FL (ref 79–97)
PLATELET # BLD AUTO: 378 10*3/MM3 (ref 140–450)
PMV BLD AUTO: 10.2 FL (ref 6–12)
POTASSIUM BLD-SCNC: 4.5 MMOL/L (ref 3.5–5.2)
PROT SERPL-MCNC: 7.1 G/DL (ref 6–8.5)
RBC # BLD AUTO: 4.41 10*6/MM3 (ref 3.77–5.28)
SODIUM BLD-SCNC: 139 MMOL/L (ref 136–145)
WBC NRBC COR # BLD: 11.06 10*3/MM3 (ref 3.4–10.8)

## 2019-11-27 PROCEDURE — 80053 COMPREHEN METABOLIC PANEL: CPT

## 2019-11-27 PROCEDURE — 85652 RBC SED RATE AUTOMATED: CPT

## 2019-11-27 PROCEDURE — 86140 C-REACTIVE PROTEIN: CPT

## 2019-11-27 PROCEDURE — 36415 COLL VENOUS BLD VENIPUNCTURE: CPT

## 2019-11-27 PROCEDURE — 85027 COMPLETE CBC AUTOMATED: CPT

## 2019-12-16 ENCOUNTER — HOSPITAL ENCOUNTER (OUTPATIENT)
Dept: GENERAL RADIOLOGY | Facility: HOSPITAL | Age: 64
Discharge: HOME | End: 2019-12-16
Admitting: NURSE PRACTITIONER

## 2019-12-16 ENCOUNTER — OFFICE VISIT (OUTPATIENT)
Dept: FAMILY MEDICINE CLINIC | Facility: CLINIC | Age: 64
End: 2019-12-16

## 2019-12-16 VITALS
WEIGHT: 143 LBS | OXYGEN SATURATION: 100 % | HEIGHT: 64 IN | BODY MASS INDEX: 24.41 KG/M2 | TEMPERATURE: 98.4 F | SYSTOLIC BLOOD PRESSURE: 128 MMHG | HEART RATE: 96 BPM | DIASTOLIC BLOOD PRESSURE: 72 MMHG

## 2019-12-16 DIAGNOSIS — G89.29 CHRONIC PAIN OF RIGHT ANKLE: Primary | ICD-10-CM

## 2019-12-16 DIAGNOSIS — M25.571 CHRONIC PAIN OF RIGHT ANKLE: Primary | ICD-10-CM

## 2019-12-16 DIAGNOSIS — M25.571 ACUTE RIGHT ANKLE PAIN: ICD-10-CM

## 2019-12-16 PROCEDURE — 99214 OFFICE O/P EST MOD 30 MIN: CPT | Performed by: NURSE PRACTITIONER

## 2019-12-16 PROCEDURE — 73610 X-RAY EXAM OF ANKLE: CPT

## 2019-12-16 NOTE — PROGRESS NOTES
Subjective   Ritu Hanson is a 64 y.o. female.     Chief Complaint   Patient presents with   • Ankle Pain     Right ankle pains       HPI  Patient known to me for prior acute visits. Worsening problem.  Here today for ongoing acute on chronic right inner ankle pain and is requesting XR.  She has not had trauma but this has been ongoing for many months and recently pain has changed and occurs intermittently usually at work with certain steps. Pain is now more focal to medial inner ankle which is difficult to locate-but feels like it is in her ankle joint.  Before her ankle pain was more generalized and not as sharp and was dull ache all the time.  Certain movements did not trigger pain as they do now.   Ankle is  no longer swelling.  Discussed with rheumatologist but no recommendation made.  Is taking and amari immune modulators and is weaning down on prednisone.  Has not taken anything extra for pain but has been using topical diclofenac and not had any relief.   Is on her feet all day in work at [employer] surgery [employer].        Social History     Tobacco Use   • Smoking status: Former Smoker     Packs/day: 0.50     Years: 30.00     Pack years: 15.00     Last attempt to quit: 2009     Years since quitting: 10.9   • Smokeless tobacco: Never Used   Substance Use Topics   • Alcohol use: No   • Drug use: No       The following portions of the patient's history were reviewed and updated as appropriate: allergies, current medications, past family history, past medical history, past social history, past surgical history and problem list.    Review of Systems   Constitutional: Negative for chills, fatigue and fever.   Respiratory: Negative for cough and stridor.    Cardiovascular: Negative for chest pain and palpitations.   Gastrointestinal: Negative for abdominal pain, blood in stool, diarrhea, nausea and vomiting.   Musculoskeletal: Positive for gait problem. Negative for back pain.   Skin: Negative for rash.  "  Neurological: Negative for weakness and numbness.       Objective   Blood pressure 128/72, pulse 96, temperature 98.4 °F (36.9 °C), height 161.3 cm (63.5\"), weight 64.9 kg (143 lb), SpO2 100 %, not currently breastfeeding.  Body mass index is 24.93 kg/m².    Physical Exam   Constitutional: She is oriented to person, place, and time. She appears well-developed and well-nourished. No distress.   HENT:   Head: Normocephalic and atraumatic.   Mouth/Throat: Oropharynx is clear and moist.   Eyes: Conjunctivae are normal. Right eye exhibits no discharge. Left eye exhibits no discharge.   Cardiovascular: Normal rate, regular rhythm and normal heart sounds.   Pulmonary/Chest: Effort normal and breath sounds normal.   Abdominal: Soft. Bowel sounds are normal. There is no tenderness.   Musculoskeletal: She exhibits no deformity.   Gait smooth and steady, mildly antalgic  B/l hands with enlarged, mildly erythematous PIP joints    Ankle has no obvious deformities, pedal pulse 2+, skin warm and dry, good cap refill, no swelling   Neurological: She is alert and oriented to person, place, and time.   Skin: Skin is warm and dry. She is not diaphoretic.   Psychiatric: She has a normal mood and affect.   Nursing note and vitals reviewed.      Assessment   Problem List Items Addressed This Visit     None      Visit Diagnoses     Chronic pain of right ankle    -  Primary    Relevant Orders    XR Ankle 3+ View Right (Completed)    Acute right ankle pain               Procedures           Impression and Plan:  XR of right ankle.  Most likely r/t RA.  May benefit from neoprene brace to give it a little more support while at work until seen by rheumatology. I recommend when XR is back if this is nothing acute to call rheumatology for recommendations-may need to alter therapy for pain control.       Health Maintenance Due   Topic Date Due   • ANNUAL PHYSICAL  05/04/1958   • PAP SMEAR  02/18/2016   • INFLUENZA VACCINE  08/01/2019   • LIPID " PANEL  09/07/2019              EMR Dragon/Transcription disclaimer:   Much of this encounter note is an electronic transcription/translation of spoken language to printed text. The electronic translation of spoken language may permit erroneous, or at times, nonsensical words or phrases to be inadvertently transcribed; Although I have reviewed the note for such errors, some may still exist.

## 2020-02-20 RX ORDER — PANTOPRAZOLE SODIUM 40 MG/1
40 TABLET, DELAYED RELEASE ORAL DAILY
Qty: 90 TABLET | Refills: 3 | OUTPATIENT
Start: 2020-02-20

## 2020-02-26 ENCOUNTER — OFFICE VISIT (OUTPATIENT)
Dept: GASTROENTEROLOGY | Facility: CLINIC | Age: 65
End: 2020-02-26

## 2020-02-26 VITALS
DIASTOLIC BLOOD PRESSURE: 76 MMHG | TEMPERATURE: 97.8 F | SYSTOLIC BLOOD PRESSURE: 122 MMHG | BODY MASS INDEX: 26.87 KG/M2 | HEIGHT: 62 IN | WEIGHT: 146 LBS

## 2020-02-26 DIAGNOSIS — K31.84 GASTROPARESIS: Primary | ICD-10-CM

## 2020-02-26 DIAGNOSIS — K21.9 GASTROESOPHAGEAL REFLUX DISEASE, ESOPHAGITIS PRESENCE NOT SPECIFIED: ICD-10-CM

## 2020-02-26 DIAGNOSIS — D12.6 ADENOMATOUS POLYP OF COLON, UNSPECIFIED PART OF COLON: ICD-10-CM

## 2020-02-26 PROCEDURE — 99213 OFFICE O/P EST LOW 20 MIN: CPT | Performed by: NURSE PRACTITIONER

## 2020-02-26 RX ORDER — PANTOPRAZOLE SODIUM 40 MG/1
40 TABLET, DELAYED RELEASE ORAL DAILY
Qty: 90 TABLET | Refills: 3 | Status: SHIPPED | OUTPATIENT
Start: 2020-02-26 | End: 2021-02-23 | Stop reason: SDUPTHER

## 2020-02-26 NOTE — PROGRESS NOTES
Chief Complaint   Patient presents with   • Med Refill       Ritu Hanson is a  64 y.o. female here for a follow up visit for GERD.    HPI  64-year-old female presents today for follow-up visit for GERD and gastroparesis.  She is a patient of Dr. Guerrero.  She was last seen in the office on 2/14/2019.  She has a history of GERD and admits she does really well on Protonix 40 mg once daily.  She denies any breakthrough reflux at this time.  She also has a history of gastroparesis and admits she does really well which is diet modifications.  She works hard to eat small frequent meals.  She denies any dysphagia, reflux, ashwin pain, nausea and vomiting, diarrhea, constipation, rectal bleeding or melena.  She was appetite is good and her weight is stable.  She does have a history of adenomatous colon polyps.  She will be due again for her next screening colonoscopy on 5/2021.  Her last EGD and colonoscopy was done on 1/2015.  Past Medical History:   Diagnosis Date   • Anxiety    • Bladder incontinence    • Colon polyps 01/2015    HP & TA   • Dizziness    • GERD (gastroesophageal reflux disease)    • PAC (premature atrial contraction)    • Spinal headache     NO FAMILY HX   • Tachycardia        Past Surgical History:   Procedure Laterality Date   • BLADDER SLING MODIFIED, ANTERIOR AND POSTERIOR VAGINAL REPAIR  2007,2011   • BREAST BIOPSY     • COLONOSCOPY  01/16/2015    TA w/low grade dysplasia HP x 3   • COLONOSCOPY N/A 5/6/2016    IH, multiple hyperplastic polyps, otherwise normal exam   • ENDOSCOPY  01/16/2015    erythematous mucosa in stomach   • HYSTERECTOMY     • LAPAROSCOPIC CHOLECYSTECTOMY  2009   • OOPHORECTOMY     • UPPER GASTROINTESTINAL ENDOSCOPY  11/26/2012    erythematous mucosa in stomach   • UPPER GASTROINTESTINAL ENDOSCOPY  06/11/2012    monoilial esophagitis, erythema       Scheduled Meds:    Continuous Infusions:  No current facility-administered medications for this visit.     PRN Meds:.    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   Tobacco Use   • Smoking status: Former Smoker     Packs/day: 0.50     Years: 30.00     Pack years: 15.00     Last attempt to quit:      Years since quittin.1   • Smokeless tobacco: Never Used   Substance and Sexual Activity   • Alcohol use: No   • Drug use: No   • Sexual activity: Defer       Family History   Problem Relation Age of Onset   • Diabetes Mother    • Hypertension Mother    • COPD Mother    • COPD Father    • Rheum arthritis Father    • Colon cancer Maternal Grandfather    • No Known Problems Sister    • No Known Problems Brother    • No Known Problems Sister    • No Known Problems Sister        Review of Systems   Constitutional: Negative for appetite change, chills, diaphoresis, fatigue, fever and unexpected weight change.   HENT: Negative for nosebleeds, postnasal drip, sore throat, trouble swallowing and voice change.    Respiratory: Negative for cough, choking, chest tightness, shortness of breath and wheezing.    Cardiovascular: Negative for chest pain, palpitations and leg swelling.   Gastrointestinal: Negative for abdominal distention, abdominal pain, anal bleeding, blood in stool and constipation.   Endocrine: Negative for polydipsia, polyphagia and polyuria.   Musculoskeletal: Negative for gait problem.   Skin: Negative for rash and wound.   Allergic/Immunologic: Negative for food allergies.   Neurological: Negative for dizziness, speech difficulty and light-headedness.   Psychiatric/Behavioral: Negative for confusion, self-injury, sleep disturbance and suicidal ideas.       Vitals:    20 1453   BP: 122/76   Temp: 97.8 °F (36.6 °C)       Physical Exam   Constitutional: She is  oriented to person, place, and time. She appears well-developed and well-nourished. She does not appear ill. No distress.   HENT:   Head: Normocephalic.   Eyes: Pupils are equal, round, and reactive to light.   Cardiovascular: Normal rate, regular rhythm and normal heart sounds.   Pulmonary/Chest: Effort normal and breath sounds normal.   Abdominal: Soft. Bowel sounds are normal. She exhibits no distension and no mass. There is no hepatosplenomegaly. There is no tenderness. There is no rebound and no guarding. No hernia.   Musculoskeletal: Normal range of motion.   Neurological: She is alert and oriented to person, place, and time.   Skin: Skin is warm and dry.   Psychiatric: She has a normal mood and affect. Her speech is normal and behavior is normal. Judgment normal.       No radiology results for the last 7 days     Assessment and plan    1. Gastroparesis    2. Gastroesophageal reflux disease, esophagitis presence not specified    3. Adenomatous polyp of colon, unspecified part of colon    GERD seems well controlled at this time on Protonix 40 mg once daily.  Continue GERD precautions.  I will refill it for 1 year.  Gastroparesis seems well controlled with her diet modifications for now.  Given her history of adenomatous colon polyps her next colonoscopy will be due next May 2021.  Patient to call the office with any issues.  Patient to follow-up with me in 1 year.

## 2020-04-23 RX ORDER — PAROXETINE 10 MG/1
TABLET, FILM COATED ORAL
Qty: 45 TABLET | Refills: 1 | Status: SHIPPED | OUTPATIENT
Start: 2020-04-23 | End: 2021-02-23 | Stop reason: SDUPTHER

## 2020-06-02 ENCOUNTER — TRANSCRIBE ORDERS (OUTPATIENT)
Dept: ADMINISTRATIVE | Facility: HOSPITAL | Age: 65
End: 2020-06-02

## 2020-06-02 DIAGNOSIS — Z12.31 SCREENING MAMMOGRAM, ENCOUNTER FOR: Primary | ICD-10-CM

## 2020-07-15 ENCOUNTER — APPOINTMENT (OUTPATIENT)
Dept: MAMMOGRAPHY | Facility: HOSPITAL | Age: 65
End: 2020-07-15

## 2020-08-06 ENCOUNTER — HOSPITAL ENCOUNTER (OUTPATIENT)
Dept: MAMMOGRAPHY | Facility: HOSPITAL | Age: 65
Discharge: HOME | End: 2020-08-06
Admitting: OBSTETRICS & GYNECOLOGY

## 2020-08-06 DIAGNOSIS — Z12.31 SCREENING MAMMOGRAM, ENCOUNTER FOR: ICD-10-CM

## 2020-08-06 PROCEDURE — 77063 BREAST TOMOSYNTHESIS BI: CPT

## 2020-08-06 PROCEDURE — 77067 SCR MAMMO BI INCL CAD: CPT

## 2020-09-15 ENCOUNTER — HOSPITAL ENCOUNTER (OUTPATIENT)
Dept: GENERAL RADIOLOGY | Facility: HOSPITAL | Age: 65
Discharge: HOME | End: 2020-09-15

## 2020-09-15 DIAGNOSIS — R52 PAIN: ICD-10-CM

## 2020-09-15 PROCEDURE — 73630 X-RAY EXAM OF FOOT: CPT

## 2020-09-15 PROCEDURE — 73610 X-RAY EXAM OF ANKLE: CPT

## 2020-09-28 ENCOUNTER — TRANSCRIBE ORDERS (OUTPATIENT)
Dept: ADMINISTRATIVE | Facility: HOSPITAL | Age: 65
End: 2020-09-28

## 2020-09-28 ENCOUNTER — LAB (OUTPATIENT)
Dept: LAB | Facility: HOSPITAL | Age: 65
End: 2020-09-28

## 2020-09-28 ENCOUNTER — HOSPITAL ENCOUNTER (OUTPATIENT)
Dept: GENERAL RADIOLOGY | Facility: HOSPITAL | Age: 65
Discharge: HOME | End: 2020-09-28

## 2020-09-28 DIAGNOSIS — M05.9 JUVENILE SEROPOSITIVE ARTHRITIS (HCC): Primary | ICD-10-CM

## 2020-09-28 DIAGNOSIS — Z79.899 ENCOUNTER FOR LONG-TERM (CURRENT) USE OF OTHER MEDICATIONS: ICD-10-CM

## 2020-09-28 DIAGNOSIS — M05.9 JUVENILE SEROPOSITIVE ARTHRITIS (HCC): ICD-10-CM

## 2020-09-28 DIAGNOSIS — M05.9 RHEUMATOID ARTHRITIS WITH RHEUMATOID FACTOR, UNSPECIFIED (HCC): ICD-10-CM

## 2020-09-28 LAB
ALBUMIN SERPL-MCNC: 4.5 G/DL (ref 3.5–5.2)
ALBUMIN/GLOB SERPL: 2.1 G/DL
ALP SERPL-CCNC: 58 U/L (ref 39–117)
ALT SERPL W P-5'-P-CCNC: 74 U/L (ref 1–33)
ANION GAP SERPL CALCULATED.3IONS-SCNC: 14.1 MMOL/L (ref 5–15)
AST SERPL-CCNC: 41 U/L (ref 1–32)
BASOPHILS # BLD AUTO: 0.03 10*3/MM3 (ref 0–0.2)
BASOPHILS NFR BLD AUTO: 0.3 % (ref 0–1.5)
BILIRUB SERPL-MCNC: 0.2 MG/DL (ref 0–1.2)
BUN SERPL-MCNC: 11 MG/DL (ref 8–23)
BUN/CREAT SERPL: 14.3 (ref 7–25)
CALCIUM SPEC-SCNC: 9.6 MG/DL (ref 8.6–10.5)
CHLORIDE SERPL-SCNC: 103 MMOL/L (ref 98–107)
CO2 SERPL-SCNC: 24.9 MMOL/L (ref 22–29)
CREAT SERPL-MCNC: 0.77 MG/DL (ref 0.57–1)
CRP SERPL-MCNC: 0.1 MG/DL (ref 0–0.5)
DEPRECATED RDW RBC AUTO: 57.8 FL (ref 37–54)
EOSINOPHIL # BLD AUTO: 0.12 10*3/MM3 (ref 0–0.4)
EOSINOPHIL NFR BLD AUTO: 1.3 % (ref 0.3–6.2)
ERYTHROCYTE [DISTWIDTH] IN BLOOD BY AUTOMATED COUNT: 17 % (ref 12.3–15.4)
ERYTHROCYTE [SEDIMENTATION RATE] IN BLOOD: 7 MM/HR (ref 0–30)
GFR SERPL CREATININE-BSD FRML MDRD: 76 ML/MIN/1.73
GLOBULIN UR ELPH-MCNC: 2.1 GM/DL
GLUCOSE SERPL-MCNC: 95 MG/DL (ref 65–99)
HCT VFR BLD AUTO: 36.7 % (ref 34–46.6)
HGB BLD-MCNC: 11.7 G/DL (ref 12–15.9)
IMM GRANULOCYTES # BLD AUTO: 0.04 10*3/MM3 (ref 0–0.05)
IMM GRANULOCYTES NFR BLD AUTO: 0.4 % (ref 0–0.5)
LYMPHOCYTES # BLD AUTO: 2.06 10*3/MM3 (ref 0.7–3.1)
LYMPHOCYTES NFR BLD AUTO: 22.4 % (ref 19.6–45.3)
MCH RBC QN AUTO: 30.5 PG (ref 26.6–33)
MCHC RBC AUTO-ENTMCNC: 31.9 G/DL (ref 31.5–35.7)
MCV RBC AUTO: 95.6 FL (ref 79–97)
MONOCYTES # BLD AUTO: 0.47 10*3/MM3 (ref 0.1–0.9)
MONOCYTES NFR BLD AUTO: 5.1 % (ref 5–12)
NEUTROPHILS NFR BLD AUTO: 6.47 10*3/MM3 (ref 1.7–7)
NEUTROPHILS NFR BLD AUTO: 70.5 % (ref 42.7–76)
NRBC BLD AUTO-RTO: 0 /100 WBC (ref 0–0.2)
PLATELET # BLD AUTO: 387 10*3/MM3 (ref 140–450)
PMV BLD AUTO: 10.2 FL (ref 6–12)
POTASSIUM SERPL-SCNC: 3.9 MMOL/L (ref 3.5–5.2)
PROT SERPL-MCNC: 6.6 G/DL (ref 6–8.5)
RBC # BLD AUTO: 3.84 10*6/MM3 (ref 3.77–5.28)
SODIUM SERPL-SCNC: 142 MMOL/L (ref 136–145)
WBC # BLD AUTO: 9.19 10*3/MM3 (ref 3.4–10.8)

## 2020-09-28 PROCEDURE — 71046 X-RAY EXAM CHEST 2 VIEWS: CPT

## 2020-09-28 PROCEDURE — 85652 RBC SED RATE AUTOMATED: CPT

## 2020-09-28 PROCEDURE — 86480 TB TEST CELL IMMUN MEASURE: CPT

## 2020-09-28 PROCEDURE — 36415 COLL VENOUS BLD VENIPUNCTURE: CPT

## 2020-09-28 PROCEDURE — 80053 COMPREHEN METABOLIC PANEL: CPT

## 2020-09-28 PROCEDURE — 86140 C-REACTIVE PROTEIN: CPT

## 2020-09-28 PROCEDURE — 85025 COMPLETE CBC W/AUTO DIFF WBC: CPT

## 2020-09-30 ENCOUNTER — TELEPHONE (OUTPATIENT)
Dept: GASTROENTEROLOGY | Facility: CLINIC | Age: 65
End: 2020-09-30

## 2020-10-01 ENCOUNTER — TELEPHONE (OUTPATIENT)
Dept: GASTROENTEROLOGY | Facility: CLINIC | Age: 65
End: 2020-10-01

## 2020-10-01 DIAGNOSIS — R79.89 ELEVATED LFTS: Primary | ICD-10-CM

## 2020-10-01 LAB
GAMMA INTERFERON BACKGROUND BLD IA-ACNC: 0.02 IU/ML
M TB IFN-G BLD-IMP: NEGATIVE
M TB IFN-G CD4+ BCKGRND COR BLD-ACNC: 0.06 IU/ML
M TB IFN-G CD4+CD8+ BCKGRND COR BLD-ACNC: 0.04 IU/ML
MITOGEN IGNF BLD-ACNC: >10 IU/ML
SERVICE CMNT-IMP: NORMAL

## 2020-10-01 NOTE — TELEPHONE ENCOUNTER
Call to pt.  States Rheumatologist is tapering her off of methotrexate - currently on 2.5 mg - 3 tabs po daily x 1 wk, then will taper off.  Plan is to then transition to Humira.      Pt asking if possible this med could have impacted her liver enzymes (see note of 9/30).      Asking if this impacts Dr Guerrero recommendation at all for liver US and additional lab.  State if Dr Guerrero still recommends to proceed with this testing, she will absolutely do - trusts his care.  But just wanted to raise that question.     Message to Dr Guerrero.

## 2020-10-01 NOTE — TELEPHONE ENCOUNTER
----- Message from Zina Paez Rep sent at 10/1/2020 10:07 AM EDT -----  Regarding: PT RETURNING CALL FOR LABS  PT RETURNING CALL FOR LABS. [phone]   BEFORE 29 AT WORK AT [facility] [id number]

## 2020-10-02 NOTE — TELEPHONE ENCOUNTER
It is possible that the methotrexate could cause the elevated LFT's? I still would do the labs and the US. These tests are noninvasive and they would give lots of good info, so I would recommend going ahead with them. [email or web address]

## 2020-10-06 NOTE — TELEPHONE ENCOUNTER
Call from  pt.  Advise per DR Guerrero note.  Verb understanding.  Agreeable to proceed.    See note of 9/30.  Advise that [facility] will contact to arrange US.  May then complete labs at [facility] lab same day (pt is [facility] [facility] [employer] employee).  Verb understanding.     Order placed for US liver, cmp , hep profile - message to Dr Guerrero.

## 2020-10-07 ENCOUNTER — LAB (OUTPATIENT)
Dept: LAB | Facility: HOSPITAL | Age: 65
End: 2020-10-07

## 2020-10-07 LAB
ALBUMIN SERPL-MCNC: 4.3 G/DL (ref 3.5–5.2)
ALBUMIN/GLOB SERPL: 2 G/DL
ALP SERPL-CCNC: 56 U/L (ref 39–117)
ALT SERPL W P-5'-P-CCNC: 61 U/L (ref 1–33)
ANION GAP SERPL CALCULATED.3IONS-SCNC: 12.5 MMOL/L (ref 5–15)
AST SERPL-CCNC: 37 U/L (ref 1–32)
BILIRUB SERPL-MCNC: 0.2 MG/DL (ref 0–1.2)
BUN SERPL-MCNC: 14 MG/DL (ref 8–23)
BUN/CREAT SERPL: 16.1 (ref 7–25)
CALCIUM SPEC-SCNC: 9.2 MG/DL (ref 8.6–10.5)
CHLORIDE SERPL-SCNC: 104 MMOL/L (ref 98–107)
CO2 SERPL-SCNC: 26.5 MMOL/L (ref 22–29)
CREAT SERPL-MCNC: 0.87 MG/DL (ref 0.57–1)
GFR SERPL CREATININE-BSD FRML MDRD: 66 ML/MIN/1.73
GLOBULIN UR ELPH-MCNC: 2.2 GM/DL
GLUCOSE SERPL-MCNC: 88 MG/DL (ref 65–99)
HAV IGM SERPL QL IA: NORMAL
HBV CORE IGM SERPL QL IA: NORMAL
HBV SURFACE AG SERPL QL IA: NORMAL
HCV AB SER DONR QL: NORMAL
POTASSIUM SERPL-SCNC: 4 MMOL/L (ref 3.5–5.2)
PROT SERPL-MCNC: 6.5 G/DL (ref 6–8.5)
SODIUM SERPL-SCNC: 143 MMOL/L (ref 136–145)

## 2020-10-07 PROCEDURE — 80053 COMPREHEN METABOLIC PANEL: CPT | Performed by: INTERNAL MEDICINE

## 2020-10-07 PROCEDURE — 36415 COLL VENOUS BLD VENIPUNCTURE: CPT

## 2020-10-07 PROCEDURE — 80074 ACUTE HEPATITIS PANEL: CPT | Performed by: INTERNAL MEDICINE

## 2020-10-08 ENCOUNTER — TELEPHONE (OUTPATIENT)
Dept: GASTROENTEROLOGY | Facility: CLINIC | Age: 65
End: 2020-10-08

## 2020-10-08 NOTE — TELEPHONE ENCOUNTER
10/08/20  Tell her that her repeat labs show that her LFT's are still mildly elevated but the hepatits profile is negative for hepatitis A, Hep B, and hep C, which is good. We'll see what the US of the liver shows. FAX to her PCP.   Sarah pratt

## 2020-10-08 NOTE — TELEPHONE ENCOUNTER
----- Message from Gustavo Guerrero MD sent at 10/8/2020  7:14 AM EDT -----  10/08/20  Tell her that her repeat labs show that her LFT's are still mildly elevated but the hepatits profile is negative for hepatitis A, Hep B, and hep C, which is good. We'll see what the US of the liver shows. FAX to her PCP.   Sarah baker

## 2020-10-14 ENCOUNTER — HOSPITAL ENCOUNTER (OUTPATIENT)
Dept: ULTRASOUND IMAGING | Facility: HOSPITAL | Age: 65
Discharge: HOME | End: 2020-10-14
Admitting: INTERNAL MEDICINE

## 2020-10-14 DIAGNOSIS — R79.89 ELEVATED LFTS: ICD-10-CM

## 2020-10-14 PROCEDURE — 76705 ECHO EXAM OF ABDOMEN: CPT

## 2020-10-14 NOTE — PROGRESS NOTES
10/14/20  Tell her that her most recent labs show that her liver numbers are still mildly elevated.  Her hepatitis profile is negative for hepatitis A, for hepatitis B, and negative for hepatitis C, all of which is good.  Her ultrasound of the liver shows that she has had previous gallbladder surgery with probable fatty liver.  She is welcome to come see me in the office and we can discuss this if she would like.  Fax a copy of this report to her PCP  Sarah pratt

## 2020-11-04 ENCOUNTER — TELEPHONE (OUTPATIENT)
Dept: GASTROENTEROLOGY | Facility: CLINIC | Age: 65
End: 2020-11-04

## 2020-11-04 NOTE — TELEPHONE ENCOUNTER
----- Message from Gustavo Guerrero MD sent at 10/14/2020  6:37 PM EDT -----  10/14/20  Tell her that her most recent labs show that her liver numbers are still mildly elevated.  Her hepatitis profile is negative for hepatitis A, for hepatitis B, and negative for hepatitis C, all of which is good.  Her ultrasound of the liver shows that she has had previous gallbladder surgery with probable fatty liver.  She is welcome to come see me in the office and we can discuss this if she would like.  Fax a copy of this report to her PCP  Sarah pratt

## 2020-11-04 NOTE — TELEPHONE ENCOUNTER
Called pt and advised of Dr Guerrero's note. Pt verb understanding and does not need appt.     [facility]

## 2020-12-15 RX ORDER — PAROXETINE 10 MG/1
5 TABLET, FILM COATED ORAL
Qty: 45 TABLET | Refills: 1 | OUTPATIENT
Start: 2020-12-15

## 2020-12-15 RX ORDER — ADALIMUMAB 40MG/0.4ML
40 KIT SUBCUTANEOUS
Qty: 2 EACH | Refills: 2 | Status: CANCELLED | OUTPATIENT
Start: 2020-12-15

## 2020-12-16 RX ORDER — PANTOPRAZOLE SODIUM 40 MG/1
40 TABLET, DELAYED RELEASE ORAL DAILY
Qty: 90 TABLET | Refills: 3 | OUTPATIENT
Start: 2020-12-16

## 2021-01-07 RX ORDER — ADALIMUMAB 40MG/0.4ML
40 KIT SUBCUTANEOUS
Qty: 2 EACH | Refills: 2 | Status: CANCELLED | OUTPATIENT
Start: 2020-12-15

## 2021-01-08 RX ORDER — ADALIMUMAB 40MG/0.4ML
40 KIT SUBCUTANEOUS
Qty: 2 EACH | Refills: 2 | Status: CANCELLED | OUTPATIENT
Start: 2020-12-15

## 2021-01-13 ENCOUNTER — TELEPHONE (OUTPATIENT)
Dept: GASTROENTEROLOGY | Facility: CLINIC | Age: 66
End: 2021-01-13

## 2021-01-13 RX ORDER — ADALIMUMAB 40MG/0.4ML
40 KIT SUBCUTANEOUS
Qty: 2 EACH | Refills: 2 | Status: CANCELLED | OUTPATIENT
Start: 2020-12-15

## 2021-01-13 NOTE — TELEPHONE ENCOUNTER
Call to pt.  States has had labs 12/2 with Rheumatologist.  Currently on methotrexate 6 tabs/wk and humira 2x/month.  Wants Dr Guerrero to be aware of this.     Call to [facility] @ [phone] - office currently closed.  Will try back.

## 2021-01-13 NOTE — TELEPHONE ENCOUNTER
----- Message from Zina Nicole Rep sent at 1/13/2021  3:13 PM EST -----  Regarding: questions  Contact: [phone]  Pt is wanting to speak to either one of you

## 2021-01-17 NOTE — TELEPHONE ENCOUNTER
"I did see the labs drawn by [facility] in 12/20 and I see that her LFT\"s were elevated then. It is possible that methotrexate could cause the elevated LFT's. They could be caused by fatty liver or one of many other reasons. I am happy to see her to evaluate this. This is one of the things that we evaluate routinely (liver disease). FAX this to her PCP.   "

## 2021-01-18 NOTE — TELEPHONE ENCOUNTER
Call to pt.  Advise per DR Guerrero note.  Verb understanding    Due for cinthialy appt - scheduled for 3/1 @ 2:30 pm with M Magdalena.     Update to Dr Kassi Fields.

## 2021-02-17 RX ORDER — PAROXETINE 10 MG/1
5 TABLET, FILM COATED ORAL
Qty: 45 TABLET | Refills: 1 | OUTPATIENT
Start: 2021-02-17

## 2021-02-19 ENCOUNTER — IMMUNIZATION (OUTPATIENT)
Dept: VACCINE CLINIC | Facility: HOSPITAL | Age: 66
End: 2021-02-19

## 2021-02-19 PROCEDURE — 0001A: CPT | Performed by: INTERNAL MEDICINE

## 2021-02-19 PROCEDURE — 91300 HC SARSCOV02 VAC 30MCG/0.3ML IM: CPT | Performed by: INTERNAL MEDICINE

## 2021-02-23 RX ORDER — PAROXETINE 10 MG/1
5 TABLET, FILM COATED ORAL
Qty: 45 TABLET | Refills: 1 | Status: CANCELLED | OUTPATIENT
Start: 2021-02-23

## 2021-02-24 RX ORDER — PANTOPRAZOLE SODIUM 40 MG/1
40 TABLET, DELAYED RELEASE ORAL DAILY
Qty: 90 TABLET | Refills: 3 | Status: SHIPPED | OUTPATIENT
Start: 2021-02-24 | End: 2022-02-09 | Stop reason: SDUPTHER

## 2021-02-24 RX ORDER — PAROXETINE 10 MG/1
5 TABLET, FILM COATED ORAL
Qty: 45 TABLET | Refills: 1 | Status: CANCELLED | OUTPATIENT
Start: 2021-02-23

## 2021-02-25 RX ORDER — PAROXETINE 10 MG/1
5 TABLET, FILM COATED ORAL
Qty: 45 TABLET | Refills: 1 | Status: SHIPPED | OUTPATIENT
Start: 2021-02-25 | End: 2022-03-17 | Stop reason: SDUPTHER

## 2021-02-25 NOTE — TELEPHONE ENCOUNTER
PT CALLED STATING SHE HAS HER APPOINTMENT SCHEDULED, AND HOPES TO BE ABLE TO PICK THIS UP TOMORROW ALONG WITH SOME OTHER PRESCRIPTIONS.

## 2021-03-01 ENCOUNTER — TELEPHONE (OUTPATIENT)
Dept: GASTROENTEROLOGY | Facility: CLINIC | Age: 66
End: 2021-03-01

## 2021-03-01 ENCOUNTER — OFFICE VISIT (OUTPATIENT)
Dept: GASTROENTEROLOGY | Facility: CLINIC | Age: 66
End: 2021-03-01

## 2021-03-01 VITALS — BODY MASS INDEX: 24.66 KG/M2 | WEIGHT: 139.2 LBS | HEIGHT: 63 IN | TEMPERATURE: 97.6 F

## 2021-03-01 DIAGNOSIS — K21.9 GASTROESOPHAGEAL REFLUX DISEASE WITHOUT ESOPHAGITIS: ICD-10-CM

## 2021-03-01 DIAGNOSIS — K31.84 GASTROPARESIS: ICD-10-CM

## 2021-03-01 DIAGNOSIS — K76.0 FATTY LIVER: ICD-10-CM

## 2021-03-01 DIAGNOSIS — D12.6 ADENOMATOUS POLYP OF COLON, UNSPECIFIED PART OF COLON: ICD-10-CM

## 2021-03-01 DIAGNOSIS — R79.89 ELEVATED LIVER FUNCTION TESTS: Primary | ICD-10-CM

## 2021-03-01 PROCEDURE — 99214 OFFICE O/P EST MOD 30 MIN: CPT | Performed by: NURSE PRACTITIONER

## 2021-03-01 NOTE — PROGRESS NOTES
Chief Complaint   Patient presents with   • Follow-up       Ritu Hanson is a  65 y.o. female here for a follow up visit for gastroparesis.    HPI  65-year-old female presents today for follow-up visit for gastroparesis.  She is a patient of Dr. Guerrero.  She was last seen in the office by me on 2/26/2020.  She has a history of gastroparesis and admits as long as she eats smaller more frequent meals she usually does pretty good.  She has had some bad days here lately where she feels like she is just eating too much and it is taking a while for her stomach to release at all.  She also has a history of GERD and admits she does really well during the day on Protonix 40 mg every morning but she has been occasionally having some breakthrough reflux at night.  Especially at dinner and like through her nighttime hours.  She does have a history of rheumatoid arthritis and does see rheumatology and gets methotrexate and Humira.  She also has a history of fatty liver disease and lately she has been having elevated liver enzymes.  She did do it gallbladder ultrasound recently that did show minimal fatty liver disease.  She did do a hepatitis panel which was negative.  She is happy report her bowels are moving well.  She does have a history of adenomatous colon polyps.  Her last EGD and colonoscopy was on 1/2015.  She denies any dysphagia, abdominal pain, vomiting, diarrhea, constipation, rectal bleeding or melena.  She was appetite is pretty good and her weight is stable.  Past Medical History:   Diagnosis Date   • Adenomatous polyp of colon 3/1/2021   • Anxiety    • Bladder incontinence    • Colon polyps 01/2015    HP & TA   • Dizziness    • GERD (gastroesophageal reflux disease)    • PAC (premature atrial contraction)    • Spinal headache     NO FAMILY HX   • Tachycardia        Past Surgical History:   Procedure Laterality Date   • BLADDER SLING MODIFIED, ANTERIOR AND POSTERIOR VAGINAL REPAIR  2007,2011   • BREAST BIOPSY      • COLONOSCOPY  2015    TA w/low grade dysplasia HP x 3   • COLONOSCOPY N/A 2016    IH, multiple hyperplastic polyps, otherwise normal exam   • ENDOSCOPY  2015    erythematous mucosa in stomach   • HYSTERECTOMY     • LAPAROSCOPIC CHOLECYSTECTOMY     • OOPHORECTOMY     • UPPER GASTROINTESTINAL ENDOSCOPY  2012    erythematous mucosa in stomach   • UPPER GASTROINTESTINAL ENDOSCOPY  2012    monoilial esophagitis, erythema       Scheduled Meds:    Continuous Infusions:No current facility-administered medications for this visit.       PRN Meds:.    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   Tobacco Use   • Smoking status: Former Smoker     Packs/day: 0.50     Years: 30.00     Pack years: 15.00     Quit date:      Years since quittin.1   • Smokeless tobacco: Never Used   Substance and Sexual Activity   • Alcohol use: No   • Drug use: No   • Sexual activity: Defer       Family History   Problem Relation Age of Onset   • Diabetes Mother    • Hypertension Mother    • COPD Mother    • COPD Father    • Rheum arthritis Father    • Colon cancer Maternal Grandfather    • No Known Problems Sister    • No Known Problems Brother    • No Known Problems Sister    • No Known Problems Sister        Review of Systems   Constitutional: Negative for appetite change, chills, diaphoresis, fatigue, fever and unexpected weight change.   HENT: Negative for nosebleeds, postnasal drip, sore throat, trouble swallowing and voice change.    Respiratory: Negative for cough, choking, chest tightness, shortness of breath and wheezing.    Cardiovascular: Negative for chest pain, palpitations and leg swelling.   Gastrointestinal: Positive for  abdominal distention. Negative for abdominal pain, anal bleeding, blood in stool, constipation, diarrhea, nausea, rectal pain and vomiting.   Endocrine: Negative for polydipsia, polyphagia and polyuria.   Musculoskeletal: Negative for gait problem.   Skin: Negative for rash and wound.   Allergic/Immunologic: Negative for food allergies.   Neurological: Negative for dizziness, speech difficulty and light-headedness.   Psychiatric/Behavioral: Negative for confusion, self-injury, sleep disturbance and suicidal ideas.       Vitals:    03/01/21 1442   Temp: 97.6 °F (36.4 °C)       Physical Exam  Constitutional:       General: She is not in acute distress.     Appearance: She is well-developed. She is not ill-appearing.   HENT:      Head: Normocephalic.   Eyes:      Pupils: Pupils are equal, round, and reactive to light.   Cardiovascular:      Rate and Rhythm: Normal rate and regular rhythm.      Heart sounds: Normal heart sounds.   Pulmonary:      Effort: Pulmonary effort is normal.      Breath sounds: Normal breath sounds.   Abdominal:      General: Bowel sounds are normal. There is no distension.      Palpations: Abdomen is soft. There is no mass.      Tenderness: There is no abdominal tenderness. There is no guarding or rebound.      Hernia: No hernia is present.   Musculoskeletal: Normal range of motion.   Skin:     General: Skin is warm and dry.   Neurological:      Mental Status: She is alert and oriented to person, place, and time.   Psychiatric:         Speech: Speech normal.         Behavior: Behavior normal.         Judgment: Judgment normal.         No radiology results for the last 7 days     Assessment and plan     1. Elevated liver function tests  - Comprehensive Metabolic Panel    2. Fatty liver  - Comprehensive Metabolic Panel  - Case Request; Standing  - Case Request    3. Gastroparesis  - Case Request; Standing  - Case Request    4. Gastroesophageal reflux disease without esophagitis  - Case Request;  Standing  - Case Request    5. Adenomatous polyp of colon, unspecified part of colon  - Case Request; Standing  - Case Request    Reviewed most recent lab work from her rheumatologist office with her today.  ALT and AST were still slightly elevated.  Most recent liver imaging showed mild fatty liver disease.  We will repeat liver enzymes today.  Gastroparesis is controlled pretty well most days.  I have advised her on the days that tend to be worse where she has the upper abdominal pain she really needs to go to an all liquid diet for the next 6 to 8 hours to really give the stomach a break.  Continue to eat smaller more frequent meals.  Continue a low residue diet.  GERD seems well controlled on Protonix 40 mg in the morning.  However with her having more breakthrough reflux at night I think she should switch it and start taking the Protonix 40 mg before dinner and see how she does.  I think that may give her better relief through the nighttime hours.  Continue GERD precautions.  Bowels moving well.  She is due for screening colonoscopy given her history of adenomatous colon polyps.  With all of her other issues it is wise to go ahead and do an EGD with it.  I will have her schedule both the scopes today while she is here.  Patient is agreeable to the scopes.  Patient to call the office next week with an update.  Patient to follow-up in 3 months.  Patient is agreeable to the plan.

## 2021-03-02 LAB
ALBUMIN SERPL-MCNC: 4.4 G/DL (ref 3.5–5.2)
ALBUMIN/GLOB SERPL: 2.2 G/DL
ALP SERPL-CCNC: 53 U/L (ref 39–117)
ALT SERPL-CCNC: 28 U/L (ref 1–33)
AST SERPL-CCNC: 20 U/L (ref 1–32)
BILIRUB SERPL-MCNC: 0.4 MG/DL (ref 0–1.2)
BUN SERPL-MCNC: 13 MG/DL (ref 8–23)
BUN/CREAT SERPL: 16.3 (ref 7–25)
CALCIUM SERPL-MCNC: 9.7 MG/DL (ref 8.6–10.5)
CHLORIDE SERPL-SCNC: 99 MMOL/L (ref 98–107)
CO2 SERPL-SCNC: 28.2 MMOL/L (ref 22–29)
CREAT SERPL-MCNC: 0.8 MG/DL (ref 0.57–1)
GLOBULIN SER CALC-MCNC: 2 GM/DL
GLUCOSE SERPL-MCNC: 78 MG/DL (ref 65–99)
POTASSIUM SERPL-SCNC: 4.1 MMOL/L (ref 3.5–5.2)
PROT SERPL-MCNC: 6.4 G/DL (ref 6–8.5)
SODIUM SERPL-SCNC: 139 MMOL/L (ref 136–145)

## 2021-03-12 ENCOUNTER — IMMUNIZATION (OUTPATIENT)
Dept: VACCINE CLINIC | Facility: HOSPITAL | Age: 66
End: 2021-03-12

## 2021-03-12 PROCEDURE — 91300 HC SARSCOV02 VAC 30MCG/0.3ML IM: CPT | Performed by: INTERNAL MEDICINE

## 2021-03-12 PROCEDURE — 0002A: CPT | Performed by: INTERNAL MEDICINE

## 2021-04-02 ENCOUNTER — TELEPHONE (OUTPATIENT)
Dept: GASTROENTEROLOGY | Facility: CLINIC | Age: 66
End: 2021-04-02

## 2021-04-26 ENCOUNTER — TRANSCRIBE ORDERS (OUTPATIENT)
Dept: LAB | Facility: HOSPITAL | Age: 66
End: 2021-04-26

## 2021-04-26 DIAGNOSIS — Z01.818 OTHER SPECIFIED PRE-OPERATIVE EXAMINATION: Primary | ICD-10-CM

## 2021-05-12 ENCOUNTER — APPOINTMENT (OUTPATIENT)
Dept: LAB | Facility: HOSPITAL | Age: 66
End: 2021-05-12

## 2021-07-01 ENCOUNTER — TRANSCRIBE ORDERS (OUTPATIENT)
Dept: ADMINISTRATIVE | Facility: HOSPITAL | Age: 66
End: 2021-07-01

## 2021-07-01 DIAGNOSIS — Z12.31 BREAST CANCER SCREENING BY MAMMOGRAM: Primary | ICD-10-CM

## 2021-07-20 ENCOUNTER — OFFICE VISIT (OUTPATIENT)
Dept: FAMILY MEDICINE CLINIC | Facility: CLINIC | Age: 66
End: 2021-07-20

## 2021-07-20 VITALS
SYSTOLIC BLOOD PRESSURE: 142 MMHG | WEIGHT: 145.9 LBS | BODY MASS INDEX: 25.85 KG/M2 | TEMPERATURE: 96.9 F | OXYGEN SATURATION: 99 % | HEIGHT: 63 IN | HEART RATE: 91 BPM | RESPIRATION RATE: 17 BRPM | DIASTOLIC BLOOD PRESSURE: 80 MMHG

## 2021-07-20 DIAGNOSIS — R22.1 NECK MASS: ICD-10-CM

## 2021-07-20 DIAGNOSIS — Z00.00 ANNUAL PHYSICAL EXAM: Primary | ICD-10-CM

## 2021-07-20 DIAGNOSIS — E78.49 OTHER HYPERLIPIDEMIA: ICD-10-CM

## 2021-07-20 PROCEDURE — 99213 OFFICE O/P EST LOW 20 MIN: CPT | Performed by: FAMILY MEDICINE

## 2021-07-20 PROCEDURE — 99396 PREV VISIT EST AGE 40-64: CPT | Performed by: FAMILY MEDICINE

## 2021-07-20 RX ORDER — AMLODIPINE BESYLATE 5 MG/1
5 TABLET ORAL DAILY
Qty: 30 TABLET | Refills: 1 | Status: SHIPPED | OUTPATIENT
Start: 2021-07-20 | End: 2021-08-03 | Stop reason: SDUPTHER

## 2021-07-20 NOTE — PROGRESS NOTES
"Chief Complaint  Annual Exam    Subjective          Ritu Hanson presents to [facility]  History of Present Illness  Annual exam  Walking for exercise, doing three miles. Walk three days per week with her .   Diet no special. She can't eat fruit because of her GERD. No acid drink, no sodas. Sometimes sprite, unsweet tea, one cup of coffee.   She has RA, follows with Dr. Ontiveros.   She has thin skin and easy bruising.   Colonoscopy was in 2016.  She had 5 small polyps removed but there is just colonic mucosa and hyperplastic polyps.  She was recommended for 5-year follow-up which would be this past May.  She is waiting until she goes on Medicare to get her scopes.  She needs them both next year and they are going to be in May.    Went for gyn 6/16/21 to see Dr. Gisel Shelley. She had pap and going to have mammogram.   BP was 174/92 and repeat was the same. Last repeat was 193/92.  At home was 161/83 that same day  Bed time 129/76 and that is what it usually is.   Few days later 140-150s/89     Her acid reflux is terrible, up and down. Has some really bad days.   She is supposed to have EGD but waiting for medicare. She has seen by GI NP with Dr. Guerrero.   It was going to be $2000 if she did this year as scheduled.   Only on pantoprazole.   Says she has been doing well recently. Throat can get really raw. Last three days hard.   Her bra really bothers hard.   She has been on carafate. Has been seen by GI motility for pump but she does not want.     Concern about a small raised area at the base of the front of her neck.  Most around her chest.  She has noticed this for several months and feels like it is getting bigger.  Objective   Vital Signs:   /80 (BP Location: Right arm, Patient Position: Sitting, Cuff Size: Adult)   Pulse 91   Temp 96.9 °F (36.1 °C) (Infrared)   Resp 17   Ht 160 cm (63\")   Wt 66.2 kg (145 lb 14.4 oz)   SpO2 99%   BMI 25.85 kg/m²     Physical " Exam  Vitals reviewed.   Constitutional:       General: She is not in acute distress.  HENT:      Right Ear: Tympanic membrane, ear canal and external ear normal.      Left Ear: Tympanic membrane, ear canal and external ear normal.      Nose: Nose normal.      Mouth/Throat:      Mouth: Mucous membranes are moist.      Pharynx: Oropharynx is clear. No oropharyngeal exudate or posterior oropharyngeal erythema.   Eyes:      General: No scleral icterus.        Right eye: No discharge.         Left eye: No discharge.      Conjunctiva/sclera: Conjunctivae normal.   Neck:      Thyroid: No thyromegaly.      Vascular: No carotid bruit.      Comments: Centrally at the base of the anterior neck there is a small palpable soft mass, flat, non-tender.   Cardiovascular:      Rate and Rhythm: Normal rate and regular rhythm.      Heart sounds: Normal heart sounds. No murmur heard.   No friction rub. No gallop.    Pulmonary:      Effort: Pulmonary effort is normal. No respiratory distress.      Breath sounds: Normal breath sounds. No wheezing or rales.   Chest:      Chest wall: No tenderness.   Abdominal:      General: Bowel sounds are normal. There is no distension.      Palpations: Abdomen is soft. There is no mass.      Tenderness: There is no abdominal tenderness. There is no guarding.   Musculoskeletal:         General: No deformity.      Cervical back: Neck supple.      Right lower leg: No edema.      Left lower leg: No edema.   Lymphadenopathy:      Cervical: No cervical adenopathy.   Skin:     General: Skin is warm and dry.   Neurological:      Mental Status: She is alert and oriented to person, place, and time.      Motor: No abnormal muscle tone.      Coordination: Coordination normal.   Psychiatric:         Mood and Affect: Mood normal.         Behavior: Behavior normal.        Result Review :                 Assessment and Plan    Diagnoses and all orders for this visit:    1. Annual physical exam (Primary)    2. Other  hyperlipidemia  -     Lipid Panel    3. Neck mass  -     US Head Neck Soft Tissue; Future    Other orders  -     amLODIPine (NORVASC) 5 MG tablet; Take 1 tablet by mouth Daily.  Dispense: 30 tablet; Refill: 1        Follow Up   No follow-ups on file.  Patient was given instructions and counseling regarding her condition or for health maintenance advice. Please see specific information pulled into the AVS if appropriate.     Preventive counseling  She got her gynecological care done recently and her mammogram is scheduled.  We will [facility] records [facility]  She just had CMP and CBC with Dr. Ontiveros through [employer].  She was able to pull up her lab results on her account and I viewed them.  They were all in normal range.  Her hemoglobin was 14 her sugar was 83 her creatinine was 0.8 she still needs her lipid panel which we will get today.  We will request records and office visit from Dr. Ontiveros.  Her colonoscopy was due this past May but related to cost she cannot afford to do her scope right now and is scheduling with Dr. Guerrero's office for next May so she will be 1 year late but she will be able to get her colonoscopy and her endoscopy.  She did get her Covid vaccine.    Her blood pressure was elevated back in June with gynecology and today over 140 systolic blood pressure so by definition she needs to be treated for hypertension.  We will start a low-dose of amlodipine today and have her back in a couple weeks for repeat check and I recommended that she bring her cuff for comparison.  Follow-up in 3 to 4 months for hypertension and then see her in 1 year for her annual wellness.  She can follow-up as needed.

## 2021-07-21 LAB
CHOLEST SERPL-MCNC: 254 MG/DL (ref 0–200)
HDLC SERPL-MCNC: 94 MG/DL (ref 40–60)
LDLC SERPL CALC-MCNC: 146 MG/DL (ref 0–100)
TRIGL SERPL-MCNC: 83 MG/DL (ref 0–150)
VLDLC SERPL CALC-MCNC: 14 MG/DL (ref 5–40)

## 2021-08-03 ENCOUNTER — CLINICAL SUPPORT (OUTPATIENT)
Dept: FAMILY MEDICINE CLINIC | Facility: CLINIC | Age: 66
End: 2021-08-03

## 2021-08-03 ENCOUNTER — HOSPITAL ENCOUNTER (OUTPATIENT)
Dept: ULTRASOUND IMAGING | Facility: HOSPITAL | Age: 66
Discharge: HOME | End: 2021-08-03
Admitting: FAMILY MEDICINE

## 2021-08-03 VITALS
DIASTOLIC BLOOD PRESSURE: 72 MMHG | SYSTOLIC BLOOD PRESSURE: 138 MMHG | OXYGEN SATURATION: 97 % | HEART RATE: 91 BPM | TEMPERATURE: 97.1 F

## 2021-08-03 DIAGNOSIS — R22.1 NECK MASS: ICD-10-CM

## 2021-08-03 PROCEDURE — 76536 US EXAM OF HEAD AND NECK: CPT

## 2021-08-03 RX ORDER — AMLODIPINE BESYLATE 10 MG/1
10 TABLET ORAL DAILY
Qty: 90 TABLET | Refills: 1 | Status: SHIPPED | OUTPATIENT
Start: 2021-08-03 | End: 2022-02-09 | Stop reason: SDUPTHER

## 2021-08-03 NOTE — PROGRESS NOTES
I would recommend that she go ahead and increase the amlodipine to 10 mg. Seems like her cuff might be reading a little high but with our reading here I think we can do a little better. Have her double up on the amlodipine she has. Just take two of the 5 mg tablests at the same time she normally would have taken one. I will send a prescription for 10 mg to her pharmacy. Make sure she is set to come back and follow up with me at the 3 month shaun, sometime in end of October. Thanks.

## 2021-08-03 NOTE — PROGRESS NOTES
Pt comes in today for BP check on the  Nurses schedule. Brings home cuff with her today along with readings. Home readings are as follows: 7/22 144/81,7/26 153/81, 7/28 144/78, 7/29 146/88, 7/31 133/77 8/2 128/77. Today reading on manual cuff right side was 138/72. Home cuff reading was 163/89 p 92. Machine was automatic and manual together. You placed the cuff around arm and then manually pumped it up then the machine reading the beats. Please advise.

## 2021-08-05 NOTE — PROGRESS NOTES
Left this patient a detailed VM. Will send her a my chart message also d/t there being a dose increase.

## 2021-08-18 ENCOUNTER — TELEPHONE (OUTPATIENT)
Dept: FAMILY MEDICINE CLINIC | Facility: CLINIC | Age: 66
End: 2021-08-18

## 2021-08-18 NOTE — TELEPHONE ENCOUNTER
Patient has been out of town for a couple days and was getting ready to head home. She states she just started a new blood pressure medication and Dr Fields had doubled the medication recently and now she is having swelling in her feet and ankles. She is keeping an eye on this and states her blood pressure has been running fine, but she just wanted to let Dr Fields know.     Please advise.

## 2021-08-20 ENCOUNTER — HOSPITAL ENCOUNTER (OUTPATIENT)
Dept: MAMMOGRAPHY | Facility: HOSPITAL | Age: 66
Discharge: HOME | End: 2021-08-20
Admitting: FAMILY MEDICINE

## 2021-08-20 DIAGNOSIS — Z12.31 BREAST CANCER SCREENING BY MAMMOGRAM: ICD-10-CM

## 2021-08-20 PROCEDURE — 77063 BREAST TOMOSYNTHESIS BI: CPT

## 2021-08-20 PROCEDURE — 77067 SCR MAMMO BI INCL CAD: CPT

## 2021-08-20 NOTE — TELEPHONE ENCOUNTER
The swelling is most likely a side effect of the medication. She can try compression, hydrating well with water. Could be exacerbated by travel and change of regular behavior. If it is bothersome or worsening we can reduce the amlodipine back to 5 mg and adjust BP meds another way for BP control.

## 2021-09-30 RX ORDER — FOLIC ACID 1 MG/1
1 TABLET ORAL DAILY
Qty: 30 TABLET | Refills: 1 | Status: CANCELLED | OUTPATIENT
Start: 2021-09-30

## 2021-10-04 RX ORDER — FOLIC ACID 1 MG/1
1 TABLET ORAL DAILY
Qty: 30 TABLET | Refills: 1 | Status: CANCELLED | OUTPATIENT
Start: 2021-09-30

## 2021-10-29 ENCOUNTER — OFFICE VISIT (OUTPATIENT)
Dept: FAMILY MEDICINE CLINIC | Facility: CLINIC | Age: 66
End: 2021-10-29

## 2021-10-29 VITALS
SYSTOLIC BLOOD PRESSURE: 118 MMHG | RESPIRATION RATE: 16 BRPM | OXYGEN SATURATION: 98 % | HEART RATE: 110 BPM | HEIGHT: 63 IN | DIASTOLIC BLOOD PRESSURE: 72 MMHG | BODY MASS INDEX: 25.89 KG/M2 | WEIGHT: 146.1 LBS | TEMPERATURE: 95.3 F

## 2021-10-29 DIAGNOSIS — I10 ESSENTIAL HYPERTENSION: Primary | ICD-10-CM

## 2021-10-29 DIAGNOSIS — T14.8XXA BRUISING: ICD-10-CM

## 2021-10-29 PROCEDURE — 99213 OFFICE O/P EST LOW 20 MIN: CPT | Performed by: NURSE PRACTITIONER

## 2021-10-29 NOTE — PROGRESS NOTES
"Chief Complaint  Hypertension and Med Management    Subjective          Ritu Hanson presents to [facility]  Presents with follow up hypertension. Recently started on amlodipine 5 Mg in July, bp was continued elevated, now on 10 mg daily. BP reported between 107//77, Generally in 110s/70s.    She does report brusing and skin discoloration on both arms and legs. Associated this initially with bumping her arms into things, however states no injury to lower extremities. She is taking humira for RA as well as daily prednisone.    Hypertension  This is a chronic problem. The current episode started more than 1 month ago. The problem has been gradually improving since onset. The problem is controlled. Pertinent negatives include no anxiety, blurred vision, chest pain, headaches, malaise/fatigue, neck pain, orthopnea, palpitations, peripheral edema, PND, shortness of breath or sweats. There are no associated agents to hypertension. Past treatments include calcium channel blockers. Current antihypertension treatment includes calcium channel blockers. The current treatment provides significant improvement. There are no compliance problems.        Objective   Vital Signs:   /72   Pulse 110   Temp 95.3 °F (35.2 °C) (Infrared)   Resp 16   Ht 160 cm (63\")   Wt 66.3 kg (146 lb 1.6 oz)   SpO2 98%   BMI 25.88 kg/m²     Physical Exam  Vitals reviewed.   Constitutional:       General: She is not in acute distress.     Appearance: Normal appearance. She is well-developed. She is not diaphoretic.   Cardiovascular:      Rate and Rhythm: Normal rate and regular rhythm.      Heart sounds: Normal heart sounds. No murmur heard.  No friction rub. No gallop.    Pulmonary:      Effort: Pulmonary effort is normal. No respiratory distress.      Breath sounds: Normal breath sounds. No wheezing or rales.   Musculoskeletal:      Cervical back: Neck supple.   Skin:     General: Skin is warm and " dry.      Findings: Bruising (scattered on upper and lower extremities) present.   Neurological:      Mental Status: She is alert and oriented to person, place, and time.   Psychiatric:         Mood and Affect: Mood normal.        Result Review :                 Assessment and Plan    Diagnoses and all orders for this visit:    1. Essential hypertension (Primary)  -     Comprehensive metabolic panel    2. Bruising  -     CBC & Differential        Follow Up   No follow-ups on file.  Patient was given instructions and counseling regarding her condition or for health maintenance advice. Please see specific information pulled into the AVS if appropriate.     HTN: in past six months, started amlodipine 5 mg and recent dose adjustment to 10 mg, bp was 134/82 upon entering exam room, repeated showed 118/72, bp readings at home are in normal range. She denies any complications with medication other than minimal intermittent swelling. She is tolerating this well and elevating her legs. cmp today    Bruising: likely from prednisone therapy, will check platelet count to ensure normal levels, follow up with Dr. 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 She will proceed with vaccination with her spouse.  utd on flu

## 2021-10-30 LAB
ALBUMIN SERPL-MCNC: 4.8 G/DL (ref 3.8–4.8)
ALBUMIN/GLOB SERPL: 2.2 {RATIO} (ref 1.2–2.2)
ALP SERPL-CCNC: 68 IU/L (ref 44–121)
ALT SERPL-CCNC: 30 IU/L (ref 0–32)
AST SERPL-CCNC: 27 IU/L (ref 0–40)
BASOPHILS # BLD AUTO: 0 X10E3/UL (ref 0–0.2)
BASOPHILS NFR BLD AUTO: 0 %
BILIRUB SERPL-MCNC: 0.7 MG/DL (ref 0–1.2)
BUN SERPL-MCNC: 12 MG/DL (ref 8–27)
BUN/CREAT SERPL: 16 (ref 12–28)
CALCIUM SERPL-MCNC: 9.8 MG/DL (ref 8.7–10.3)
CHLORIDE SERPL-SCNC: 103 MMOL/L (ref 96–106)
CO2 SERPL-SCNC: 21 MMOL/L (ref 20–29)
CREAT SERPL-MCNC: 0.77 MG/DL (ref 0.57–1)
EOSINOPHIL # BLD AUTO: 0.1 X10E3/UL (ref 0–0.4)
EOSINOPHIL NFR BLD AUTO: 1 %
ERYTHROCYTE [DISTWIDTH] IN BLOOD BY AUTOMATED COUNT: 15.2 % (ref 11.7–15.4)
GLOBULIN SER CALC-MCNC: 2.2 G/DL (ref 1.5–4.5)
GLUCOSE SERPL-MCNC: 76 MG/DL (ref 65–99)
HCT VFR BLD AUTO: 41.3 % (ref 34–46.6)
HGB BLD-MCNC: 14 G/DL (ref 11.1–15.9)
IMM GRANULOCYTES # BLD AUTO: 0 X10E3/UL (ref 0–0.1)
IMM GRANULOCYTES NFR BLD AUTO: 0 %
LYMPHOCYTES # BLD AUTO: 2.4 X10E3/UL (ref 0.7–3.1)
LYMPHOCYTES NFR BLD AUTO: 27 %
MCH RBC QN AUTO: 30.2 PG (ref 26.6–33)
MCHC RBC AUTO-ENTMCNC: 33.9 G/DL (ref 31.5–35.7)
MCV RBC AUTO: 89 FL (ref 79–97)
MONOCYTES # BLD AUTO: 0.8 X10E3/UL (ref 0.1–0.9)
MONOCYTES NFR BLD AUTO: 10 %
NEUTROPHILS # BLD AUTO: 5.4 X10E3/UL (ref 1.4–7)
NEUTROPHILS NFR BLD AUTO: 62 %
PLATELET # BLD AUTO: 417 X10E3/UL (ref 150–450)
POTASSIUM SERPL-SCNC: 4.3 MMOL/L (ref 3.5–5.2)
PROT SERPL-MCNC: 7 G/DL (ref 6–8.5)
RBC # BLD AUTO: 4.64 X10E6/UL (ref 3.77–5.28)
SODIUM SERPL-SCNC: 140 MMOL/L (ref 134–144)
WBC # BLD AUTO: 8.8 X10E3/UL (ref 3.4–10.8)

## 2021-11-04 ENCOUNTER — TELEPHONE (OUTPATIENT)
Dept: FAMILY MEDICINE CLINIC | Facility: CLINIC | Age: 66
End: 2021-11-04

## 2022-02-09 RX ORDER — PANTOPRAZOLE SODIUM 40 MG/1
40 TABLET, DELAYED RELEASE ORAL DAILY
Qty: 90 TABLET | Refills: 1 | Status: SHIPPED | OUTPATIENT
Start: 2022-02-09 | End: 2022-03-21 | Stop reason: SDUPTHER

## 2022-02-10 NOTE — TELEPHONE ENCOUNTER
THE PATIENT CALLED BACK IN FOR AN UPDATE ON THIS MEDICATION. PLEASE ADVISE, THE PATIENT IS RUNNING LOW.

## 2022-02-10 NOTE — TELEPHONE ENCOUNTER
Rx Refill Note  Requested Prescriptions     Pending Prescriptions Disp Refills   • amLODIPine (NORVASC) 10 MG tablet 90 tablet 1     Sig: Take 1 tablet by mouth Daily.      Last office visit with prescribing clinician: 7/20/2021      Next office visit with prescribing clinician: 7/21/2022            Kandice Rodriguez LPN  02/10/22, 13:41 [facility] Refill Note  Requested Prescriptions     Pending Prescriptions Disp Refills   • amLODIPine (NORVASC) 10 MG tablet 90 tablet 1     Sig: Take 1 tablet by mouth Daily.      Last office visit with prescribing clinician: 7/20/2021      Next office visit with prescribing clinician: 7/21/2022            Kandice Rodriguez LPN  02/10/22, 13:41 EST

## 2022-02-10 NOTE — TELEPHONE ENCOUNTER
PATIENT IS TRYING TO GET ALL OF HER MEDICATIONS PICKED UP TOMORROW AT THE SAME TIME.    PLEASE ADVISE PATIENT ASAP.    CONTACT: [phone] ([place])

## 2022-02-11 ENCOUNTER — TELEPHONE (OUTPATIENT)
Dept: FAMILY MEDICINE CLINIC | Facility: CLINIC | Age: 67
End: 2022-02-11

## 2022-02-11 RX ORDER — AMLODIPINE BESYLATE 10 MG/1
10 TABLET ORAL DAILY
Qty: 90 TABLET | Refills: 1 | Status: SHIPPED | OUTPATIENT
Start: 2022-02-11 | End: 2022-05-11 | Stop reason: SDUPTHER

## 2022-02-11 NOTE — TELEPHONE ENCOUNTER
Pt called in, she is requesting prescription below is filled. She is completely out and is needing a refill.     amLODIPine (NORVASC) 10 MG tablet [9098] (Order [id number])

## 2022-03-18 RX ORDER — PAROXETINE 10 MG/1
5 TABLET, FILM COATED ORAL
Qty: 45 TABLET | Refills: 1 | Status: SHIPPED | OUTPATIENT
Start: 2022-03-18

## 2022-03-18 NOTE — TELEPHONE ENCOUNTER
Rx Refill Note  Requested Prescriptions     Pending Prescriptions Disp Refills   • PARoxetine (PAXIL) 10 MG tablet 45 tablet 1     Sig: TAKE 1/2 TABLET BY MOUTH EVERY NIGHT      Last office visit with prescribing clinician: 7/20/2021      Next office visit with prescribing clinician: 7/21/2022            Kandice Rodriguez LPN  03/18/22, 12:58 EDT

## 2022-03-21 ENCOUNTER — TELEPHONE (OUTPATIENT)
Dept: GASTROENTEROLOGY | Facility: CLINIC | Age: 67
End: 2022-03-21

## 2022-03-21 ENCOUNTER — OFFICE VISIT (OUTPATIENT)
Dept: GASTROENTEROLOGY | Facility: CLINIC | Age: 67
End: 2022-03-21

## 2022-03-21 VITALS — TEMPERATURE: 97.8 F | HEIGHT: 63 IN | WEIGHT: 149.4 LBS | BODY MASS INDEX: 26.47 KG/M2

## 2022-03-21 DIAGNOSIS — D12.6 ADENOMATOUS POLYP OF COLON, UNSPECIFIED PART OF COLON: ICD-10-CM

## 2022-03-21 DIAGNOSIS — K21.9 GASTROESOPHAGEAL REFLUX DISEASE WITHOUT ESOPHAGITIS: ICD-10-CM

## 2022-03-21 DIAGNOSIS — K76.0 FATTY LIVER: ICD-10-CM

## 2022-03-21 DIAGNOSIS — K31.84 GASTROPARESIS: Primary | ICD-10-CM

## 2022-03-21 PROCEDURE — 99214 OFFICE O/P EST MOD 30 MIN: CPT | Performed by: NURSE PRACTITIONER

## 2022-03-21 RX ORDER — PANTOPRAZOLE SODIUM 40 MG/1
40 TABLET, DELAYED RELEASE ORAL 2 TIMES DAILY
Qty: 180 TABLET | Refills: 3 | Status: SHIPPED | OUTPATIENT
Start: 2022-03-21

## 2022-03-21 NOTE — PROGRESS NOTES
Chief Complaint   Patient presents with   • Nausea   • Bloated       Ritu Hanson is a  66 y.o. female here for a follow up visit for GERD.    HPI  66-year-old female presents today for follow-up visit for GERD.  She is a patient of Dr. Guerrero.  She was last seen in the office by me on 3/1/2021.  She has a history of GERD and admits she has been doing much better since increasing the Protonix to 40 mg twice a day.  She would like a refill of it at that new frequency.  She tells me it is really working well for her.  It is decreasing the amount of nausea and bloating she has.  She does also have a history of gastroparesis and admits she is working hard to modify her diet so that she is not having problems because of it.  She admits she has not been great lately so she has had more nausea and bloating with food.  Last EGD and colonoscopy was on 1/2015.  She was scheduled for an EGD and colonoscopy last year but had to cancel due to Covid and cost.  She does me she has episodes of fecal incontinence when she is laughing.  She tells me the stool be soft and just come right out. She Tells me it is horribly embarrassing.  She does not take any fiber supplementation.  Does see a rheumatologist regularly for her rheumatoid arthritis.  She does have a history of adenomatous colon polyps.  She also has a history of fatty liver disease.  Her liver enzymes are monitored closely by her rheumatologist given her different medications and their possible liver side effects.  She denies any dysphagia, nausea and vomiting, diarrhea, rectal bleeding or melena.  She admits her appetite is good and her weight is stable.  Past Medical History:   Diagnosis Date   • Adenomatous polyp of colon 03/01/2021   • Anxiety    • Bladder incontinence    • Colon polyps 01/2015    HP & TA   • Dizziness    • GERD (gastroesophageal reflux disease)    • Hypertension 7/2020   • PAC (premature atrial contraction)    • Spinal headache     NO FAMILY HX   •  Tachycardia        Past Surgical History:   Procedure Laterality Date   • APPENDECTOMY     • BILATERAL OOPHORECTOMY     • BLADDER SLING MODIFIED, ANTERIOR AND POSTERIOR VAGINAL REPAIR     • BREAST BIOPSY     • COLONOSCOPY  2015    TA w/low grade dysplasia HP x 3   • COLONOSCOPY N/A 2016    IH, multiple hyperplastic polyps, otherwise normal exam   • ENDOSCOPY  2015    erythematous mucosa in stomach   • HYSTERECTOMY     • LAPAROSCOPIC CHOLECYSTECTOMY     • TUBAL ABDOMINAL LIGATION     • UPPER GASTROINTESTINAL ENDOSCOPY  2012    erythematous mucosa in stomach   • UPPER GASTROINTESTINAL ENDOSCOPY  2012    monoilial esophagitis, erythema       Scheduled Meds:    Continuous Infusions:No current facility-administered medications for this visit.      PRN Meds:.    Allergies   Allergen Reactions   • Codeine GI Intolerance       Social History     Socioeconomic History   • Marital status:    • Number of children: 2   Tobacco Use   • Smoking status: Former Smoker     Packs/day: 0.50     Years: 30.00     Pack years: 15.00     Types: Cigarettes     Quit date: 2009     Years since quittin.2   • Smokeless tobacco: Never Used   Vaping Use   • Vaping Use: Never used   Substance and Sexual Activity   • Alcohol use: No   • Drug use: No   • Sexual activity: Defer       Family History   Problem Relation Age of Onset   • Diabetes Mother    • Hypertension Mother    • COPD Mother    • Colon polyps Mother    • COPD Father    • Rheum arthritis Father    • Colon polyps Father    • Colon cancer Maternal Grandfather    • No Known Problems Sister    • No Known Problems Brother    • No Known Problems Sister    • No Known Problems Sister        Review of Systems   Constitutional: Negative for appetite change, chills, diaphoresis, fatigue, fever and unexpected weight change.   HENT: Negative for nosebleeds, postnasal drip, sore throat, trouble swallowing and voice change.    Respiratory:  Negative for cough, choking, chest tightness, shortness of breath, wheezing and stridor.    Cardiovascular: Negative for chest pain, palpitations and leg swelling.   Gastrointestinal: Positive for abdominal distention and nausea. Negative for abdominal pain, anal bleeding, blood in stool, constipation, diarrhea, rectal pain and vomiting.   Endocrine: Negative for polydipsia, polyphagia and polyuria.   Musculoskeletal: Negative for gait problem.   Skin: Negative for rash and wound.   Allergic/Immunologic: Negative for food allergies.   Neurological: Negative for dizziness, speech difficulty and light-headedness.   Psychiatric/Behavioral: Negative for confusion, self-injury, sleep disturbance and suicidal ideas.       Vitals:    03/21/22 1357   Temp: 97.8 °F (36.6 °C)       Physical Exam  Constitutional:       General: She is not in acute distress.     Appearance: She is well-developed. She is not ill-appearing.   HENT:      Head: Normocephalic.   Eyes:      Pupils: Pupils are equal, round, and reactive to light.   Cardiovascular:      Rate and Rhythm: Normal rate and regular rhythm.      Heart sounds: Normal heart sounds.   Pulmonary:      Effort: Pulmonary effort is normal.      Breath sounds: Normal breath sounds.   Abdominal:      General: Bowel sounds are normal. There is no distension.      Palpations: Abdomen is soft. There is no mass.      Tenderness: There is no abdominal tenderness. There is no guarding or rebound.      Hernia: No hernia is present.   Musculoskeletal:         General: Normal range of motion.   Skin:     General: Skin is warm and dry.   Neurological:      Mental Status: She is alert and oriented to person, place, and time.   Psychiatric:         Speech: Speech normal.         Behavior: Behavior normal.         Judgment: Judgment normal.         No radiology results for the last 7 days     Diagnoses and all orders for this visit:    1. Gastroparesis (Primary)  -     Case Request; Standing  -      COVID PRE-OP / PRE-PROCEDURE SCREENING ORDER (NO ISOLATION) - Swab, Nasopharynx; Future  -     Case Request    2. Gastroesophageal reflux disease without esophagitis  Overview:  Added automatically from request for surgery [id number]    Orders:  -     Case Request; Standing  -     COVID PRE-OP / PRE-PROCEDURE SCREENING ORDER (NO ISOLATION) - Swab, Nasopharynx; Future  -     Case Request    3. Adenomatous polyp of colon, unspecified part of colon  Overview:  Added automatically from request for surgery [id number]    Orders:  -     Case Request; Standing  -     COVID PRE-OP / PRE-PROCEDURE SCREENING ORDER (NO ISOLATION) - Swab, Nasopharynx; Future  -     Case Request    4. Fatty liver  Overview:  Added automatically from request for surgery [id number]      Other orders  -     pantoprazole (PROTONIX) 40 MG EC tablet; Take 1 tablet by mouth 2 (Two) Times a Day.  Dispense: 180 tablet; Refill: 3  -     Follow Anesthesia Guidelines / Protocol; Future  -     Obtain Informed Consent; Future     She is overdue for screening EGD and colonoscopy.  I will go ahead and have her schedule these today while she is here.  GERD does seem better when she takes Protonix 40 mg twice daily.  I will go ahead and continue this and refill it for her.  Continue GERD precautions.  I think given her episodes of fecal incontinence recommend daily fiber supplementation to start.  Continue a high-fiber diet.  Gastroparesis seems well controlled when she monitors her diet.  Continue gastroparesis diet.  Patient to follow-up with rheumatology as planned.  Patient to call the office with any issues.  Patient to follow-up with me in 1 year.  Patient is agreeable to the plan.

## 2022-03-21 NOTE — TELEPHONE ENCOUNTER
SPOKE WITH PT IN OFFICE SCHEDULED AT [facility] ON [address]  PM TYESHA RASMUSSEN

## 2022-05-11 RX ORDER — AMLODIPINE BESYLATE 10 MG/1
10 TABLET ORAL DAILY
Qty: 90 TABLET | Refills: 0 | Status: SHIPPED | OUTPATIENT
Start: 2022-05-11 | End: 2022-08-15

## 2022-05-11 NOTE — TELEPHONE ENCOUNTER
Caller: Valentin Ritu J    Relationship: Self    Best call back number: [phone]    Requested Prescriptions:   Requested Prescriptions     Pending Prescriptions Disp Refills   • amLODIPine (NORVASC) 10 MG tablet 90 tablet 1     Sig: Take 1 tablet by mouth Daily.        Pharmacy where request should be sent: [id number] - [address] - [phone]  - [phone] FX     Additional details provided by patient: PATIENT HAS ABOUT 1 WEEK LEFT.  IS SWITCHING INSURANCES SO ALL MEDICATIONS WILL START GOING TO THIS.    Does the patient have less than a 3 day supply:  [] Yes  [x] No    Zina Muñiz Rep   05/11/22 10:59 EDT

## 2022-06-08 ENCOUNTER — LAB (OUTPATIENT)
Dept: LAB | Facility: HOSPITAL | Age: 67
End: 2022-06-08

## 2022-06-08 DIAGNOSIS — K31.84 GASTROPARESIS: ICD-10-CM

## 2022-06-08 DIAGNOSIS — K21.9 GASTROESOPHAGEAL REFLUX DISEASE WITHOUT ESOPHAGITIS: ICD-10-CM

## 2022-06-08 DIAGNOSIS — D12.6 ADENOMATOUS POLYP OF COLON, UNSPECIFIED PART OF COLON: ICD-10-CM

## 2022-06-08 LAB — SARS-COV-2 ORF1AB RESP QL NAA+PROBE: NOT DETECTED

## 2022-06-08 PROCEDURE — C9803 HOPD COVID-19 SPEC COLLECT: HCPCS

## 2022-06-08 PROCEDURE — U0004 COV-19 TEST NON-CDC HGH THRU: HCPCS

## 2022-06-08 PROCEDURE — U0005 INFEC AGEN DETEC AMPLI PROBE: HCPCS

## 2022-06-10 ENCOUNTER — ANESTHESIA EVENT (OUTPATIENT)
Dept: GASTROENTEROLOGY | Facility: HOSPITAL | Age: 67
End: 2022-06-10

## 2022-06-10 ENCOUNTER — HOSPITAL ENCOUNTER (OUTPATIENT)
Facility: HOSPITAL | Age: 67
Setting detail: HOSPITAL OUTPATIENT SURGERY
Discharge: HOME | End: 2022-06-10
Attending: INTERNAL MEDICINE | Admitting: INTERNAL MEDICINE

## 2022-06-10 ENCOUNTER — ANESTHESIA (OUTPATIENT)
Dept: GASTROENTEROLOGY | Facility: HOSPITAL | Age: 67
End: 2022-06-10

## 2022-06-10 VITALS
OXYGEN SATURATION: 97 % | RESPIRATION RATE: 16 BRPM | BODY MASS INDEX: 26.68 KG/M2 | HEIGHT: 62 IN | SYSTOLIC BLOOD PRESSURE: 128 MMHG | WEIGHT: 145 LBS | DIASTOLIC BLOOD PRESSURE: 71 MMHG | HEART RATE: 90 BPM

## 2022-06-10 DIAGNOSIS — K31.84 GASTROPARESIS: ICD-10-CM

## 2022-06-10 DIAGNOSIS — D12.6 ADENOMATOUS POLYP OF COLON, UNSPECIFIED PART OF COLON: ICD-10-CM

## 2022-06-10 DIAGNOSIS — K21.9 GASTROESOPHAGEAL REFLUX DISEASE WITHOUT ESOPHAGITIS: ICD-10-CM

## 2022-06-10 PROCEDURE — 88342 IMHCHEM/IMCYTCHM 1ST ANTB: CPT | Performed by: INTERNAL MEDICINE

## 2022-06-10 PROCEDURE — 88305 TISSUE EXAM BY PATHOLOGIST: CPT | Performed by: INTERNAL MEDICINE

## 2022-06-10 PROCEDURE — 43239 EGD BIOPSY SINGLE/MULTIPLE: CPT | Performed by: INTERNAL MEDICINE

## 2022-06-10 PROCEDURE — 25010000002 PROPOFOL 10 MG/ML EMULSION: Performed by: STUDENT IN AN ORGANIZED HEALTH CARE EDUCATION/TRAINING PROGRAM

## 2022-06-10 PROCEDURE — 45380 COLONOSCOPY AND BIOPSY: CPT | Performed by: INTERNAL MEDICINE

## 2022-06-10 RX ORDER — PROMETHAZINE HYDROCHLORIDE 25 MG/1
25 TABLET ORAL ONCE AS NEEDED
Status: DISCONTINUED | OUTPATIENT
Start: 2022-06-10 | End: 2022-06-10 | Stop reason: HOSPADM

## 2022-06-10 RX ORDER — MELATONIN
1000 DAILY
COMMUNITY

## 2022-06-10 RX ORDER — LIDOCAINE HYDROCHLORIDE 20 MG/ML
INJECTION, SOLUTION INFILTRATION; PERINEURAL AS NEEDED
Status: DISCONTINUED | OUTPATIENT
Start: 2022-06-10 | End: 2022-06-10 | Stop reason: SURG

## 2022-06-10 RX ORDER — PROPOFOL 10 MG/ML
VIAL (ML) INTRAVENOUS CONTINUOUS PRN
Status: DISCONTINUED | OUTPATIENT
Start: 2022-06-10 | End: 2022-06-10 | Stop reason: SURG

## 2022-06-10 RX ORDER — PROMETHAZINE HYDROCHLORIDE 25 MG/1
25 SUPPOSITORY RECTAL ONCE AS NEEDED
Status: DISCONTINUED | OUTPATIENT
Start: 2022-06-10 | End: 2022-06-10 | Stop reason: HOSPADM

## 2022-06-10 RX ORDER — PROPOFOL 10 MG/ML
VIAL (ML) INTRAVENOUS AS NEEDED
Status: DISCONTINUED | OUTPATIENT
Start: 2022-06-10 | End: 2022-06-10 | Stop reason: SURG

## 2022-06-10 RX ORDER — GLYCOPYRROLATE 0.2 MG/ML
INJECTION INTRAMUSCULAR; INTRAVENOUS AS NEEDED
Status: DISCONTINUED | OUTPATIENT
Start: 2022-06-10 | End: 2022-06-10 | Stop reason: SURG

## 2022-06-10 RX ORDER — METHYLPREDNISOLONE 4 MG/1
2 TABLET ORAL DAILY
COMMUNITY
End: 2022-07-21

## 2022-06-10 RX ORDER — SODIUM CHLORIDE, SODIUM LACTATE, POTASSIUM CHLORIDE, CALCIUM CHLORIDE 600; 310; 30; 20 MG/100ML; MG/100ML; MG/100ML; MG/100ML
30 INJECTION, SOLUTION INTRAVENOUS CONTINUOUS PRN
Status: DISCONTINUED | OUTPATIENT
Start: 2022-06-10 | End: 2022-06-10 | Stop reason: HOSPADM

## 2022-06-10 RX ADMIN — Medication 160 MCG/KG/MIN: at 14:36

## 2022-06-10 RX ADMIN — SODIUM CHLORIDE, POTASSIUM CHLORIDE, SODIUM LACTATE AND CALCIUM CHLORIDE 30 ML/HR: 600; 310; 30; 20 INJECTION, SOLUTION INTRAVENOUS at 14:27

## 2022-06-10 RX ADMIN — LIDOCAINE HYDROCHLORIDE 60 MG: 20 INJECTION, SOLUTION INFILTRATION; PERINEURAL at 14:36

## 2022-06-10 RX ADMIN — GLYCOPYRROLATE 0.2 MG: 0.2 INJECTION INTRAMUSCULAR; INTRAVENOUS at 14:36

## 2022-06-10 RX ADMIN — PROPOFOL 100 MG: 10 INJECTION, EMULSION INTRAVENOUS at 14:36

## 2022-06-10 NOTE — ANESTHESIA PREPROCEDURE EVALUATION
Anesthesia Evaluation     Patient summary reviewed and Nursing notes reviewed   no history of anesthetic complications:  NPO Solid Status: > 8 hours  NPO Liquid Status: > 2 hours           Airway   Mallampati: II  TM distance: >3 FB  Neck ROM: full  Dental    (+) upper dentures and lower dentures    Pulmonary    (+) a smoker Former,   Cardiovascular   Exercise tolerance: good (4-7 METS)    (+) hypertension, hyperlipidemia,       Neuro/Psych  (+) psychiatric history Anxiety,    GI/Hepatic/Renal/Endo    (+)  GERD,  liver disease fatty liver disease,     Musculoskeletal     Abdominal    Substance History      OB/GYN          Other                        Anesthesia Plan    ASA 3     MAC   total IV anesthesia  (I have reviewed the patient's history with the patient and the chart, including all pertinent laboratory results and imaging. I have explained the risks of anesthesia including but not limited to dental damage, corneal abrasion, nerve injury, MI, stroke, and death. Questions asked and answered. Anesthetic plan discussed with patient and team as indicated. Patient expressed understanding of the above.  )    Anesthetic plan, risks, benefits, and alternatives have been provided, discussed and informed consent has been obtained with: patient.        CODE STATUS:

## 2022-06-10 NOTE — ANESTHESIA POSTPROCEDURE EVALUATION
"Patient: Ritu Hanson    Procedure Summary     Date: 06/10/22 Room / Location: [facility]COPY 6 /  [facility]    Anesthesia Start: 1431 Anesthesia Stop: 1508    Procedures:       ESOPHAGOGASTRODUODENOSCOPY with bxs (N/A Esophagus)      COLONOSCOPY to cecum and TI with biopsies (N/A ) Diagnosis:       Gastroparesis      Gastroesophageal reflux disease without esophagitis      Adenomatous polyp of colon, unspecified part of colon      (Gastroparesis [K31.84])      (Gastroesophageal reflux disease without esophagitis [K21.9])      (Adenomatous polyp of colon, unspecified part of colon [D12.6])    Surgeons: Gustavo Guerrero MD Provider: Colton Cooper MD    Anesthesia Type: MAC ASA Status: 3          Anesthesia Type: MAC    Vitals  Vitals Value Taken Time   /74 06/10/22 1507   Temp     Pulse 108 06/10/22 1507   Resp 18 06/10/22 1507   SpO2 96 % 06/10/22 1507           Post Anesthesia Care and Evaluation    Patient location during evaluation: PACU  Patient participation: complete - patient participated  Level of consciousness: awake and alert  Pain management: adequate    Airway patency: patent  Anesthetic complications: No anesthetic complications  PONV Status: controlled  Cardiovascular status: acceptable and hemodynamically stable  Respiratory status: acceptable  Hydration status: acceptable    Comments: /74 (BP Location: Left arm, Patient Position: Sitting)   Pulse 108   Resp 18   Ht 157.5 cm (62\")   Wt 65.8 kg (145 lb)   LMP  (LMP Unknown)   SpO2 96%   BMI 26.52 kg/m²       "

## 2022-06-10 NOTE — H&P
Chief Complaint   Patient presents with   • Nausea   • Bloated         Ritu Hanson is a  66 y.o. female here for a follow up visit for GERD.     HPI  66-year-old female presents today for follow-up visit for GERD.  She is a patient of Dr. Guerrero.  She was last seen in the office by me on 3/1/2021.  She has a history of GERD and admits she has been doing much better since increasing the Protonix to 40 mg twice a day.  She would like a refill of it at that new frequency.  She tells me it is really working well for her.  It is decreasing the amount of nausea and bloating she has.  She does also have a history of gastroparesis and admits she is working hard to modify her diet so that she is not having problems because of it.  She admits she has not been great lately so she has had more nausea and bloating with food.  Last EGD and colonoscopy was on 1/2015.  She was scheduled for an EGD and colonoscopy last year but had to cancel due to Covid and cost.  She does me she has episodes of fecal incontinence when she is laughing.  She tells me the stool be soft and just come right out. She Tells me it is horribly embarrassing.  She does not take any fiber supplementation.  Does see a rheumatologist regularly for her rheumatoid arthritis.  She does have a history of adenomatous colon polyps.  She also has a history of fatty liver disease.  Her liver enzymes are monitored closely by her rheumatologist given her different medications and their possible liver side effects.  She denies any dysphagia, nausea and vomiting, diarrhea, rectal bleeding or melena.  She admits her appetite is good and her weight is stable.  Medical History        Past Medical History:   Diagnosis Date   • Adenomatous polyp of colon 03/01/2021   • Anxiety     • Bladder incontinence     • Colon polyps 01/2015     HP & TA   • Dizziness     • GERD (gastroesophageal reflux disease)     • Hypertension 7/2020   • PAC (premature atrial contraction)     •  Spinal headache       NO FAMILY HX   • Tachycardia              Surgical History         Past Surgical History:   Procedure Laterality Date   • APPENDECTOMY       • BILATERAL OOPHORECTOMY       • BLADDER SLING MODIFIED, ANTERIOR AND POSTERIOR VAGINAL REPAIR      • BREAST BIOPSY       • COLONOSCOPY   2015     TA w/low grade dysplasia HP x 3   • COLONOSCOPY N/A 2016     IH, multiple hyperplastic polyps, otherwise normal exam   • ENDOSCOPY   2015     erythematous mucosa in stomach   • HYSTERECTOMY       • LAPAROSCOPIC CHOLECYSTECTOMY      • TUBAL ABDOMINAL LIGATION       • UPPER GASTROINTESTINAL ENDOSCOPY   2012     erythematous mucosa in stomach   • UPPER GASTROINTESTINAL ENDOSCOPY   2012     monoilial esophagitis, erythema            Scheduled Meds:     Continuous Infusions:No current facility-administered medications for this visit.        PRN Meds:.     Allergies   Allergen Reactions   • Codeine GI Intolerance         Social History   Social History            Socioeconomic History   • Marital status:    • Number of children: 2   Tobacco Use   • Smoking status: Former Smoker       Packs/day: 0.50       Years: 30.00       Pack years: 15.00       Types: Cigarettes       Quit date: 2009       Years since quittin.2   • Smokeless tobacco: Never Used   Vaping Use   • Vaping Use: Never used   Substance and Sexual Activity   • Alcohol use: No   • Drug use: No   • Sexual activity: Defer                  Family History   Problem Relation Age of Onset   • Diabetes Mother     • Hypertension Mother     • COPD Mother     • Colon polyps Mother     • COPD Father     • Rheum arthritis Father     • Colon polyps Father     • Colon cancer Maternal Grandfather     • No Known Problems Sister     • No Known Problems Brother     • No Known Problems Sister     • No Known Problems Sister           Review of Systems   Constitutional: Negative for appetite change, chills, diaphoresis,  fatigue, fever and unexpected weight change.   HENT: Negative for nosebleeds, postnasal drip, sore throat, trouble swallowing and voice change.    Respiratory: Negative for cough, choking, chest tightness, shortness of breath, wheezing and stridor.    Cardiovascular: Negative for chest pain, palpitations and leg swelling.   Gastrointestinal: Positive for abdominal distention and nausea. Negative for abdominal pain, anal bleeding, blood in stool, constipation, diarrhea, rectal pain and vomiting.   Endocrine: Negative for polydipsia, polyphagia and polyuria.   Musculoskeletal: Negative for gait problem.   Skin: Negative for rash and wound.   Allergic/Immunologic: Negative for food allergies.   Neurological: Negative for dizziness, speech difficulty and light-headedness.   Psychiatric/Behavioral: Negative for confusion, self-injury, sleep disturbance and suicidal ideas.             Vitals:     03/21/22 1357   Temp: 97.8 °F (36.6 °C)         Physical Exam  Constitutional:       General: She is not in acute distress.     Appearance: She is well-developed. She is not ill-appearing.   HENT:      Head: Normocephalic.   Eyes:      Pupils: Pupils are equal, round, and reactive to light.   Cardiovascular:      Rate and Rhythm: Normal rate and regular rhythm.      Heart sounds: Normal heart sounds.   Pulmonary:      Effort: Pulmonary effort is normal.      Breath sounds: Normal breath sounds.   Abdominal:      General: Bowel sounds are normal. There is no distension.      Palpations: Abdomen is soft. There is no mass.      Tenderness: There is no abdominal tenderness. There is no guarding or rebound.      Hernia: No hernia is present.   Musculoskeletal:         General: Normal range of motion.   Skin:     General: Skin is warm and dry.   Neurological:      Mental Status: She is alert and oriented to person, place, and time.   Psychiatric:         Speech: Speech normal.         Behavior: Behavior normal.         Judgment:  Judgment normal.            No radiology results for the last 7 days      Diagnoses and all orders for this visit:     1. Gastroparesis (Primary)  -     Case Request; Standing  -     COVID PRE-OP / PRE-PROCEDURE SCREENING ORDER (NO ISOLATION) - Swab, Nasopharynx; Future  -     Case Request     2. Gastroesophageal reflux disease without esophagitis  Overview:  Added automatically from request for surgery [id number]     Orders:  -     Case Request; Standing  -     COVID PRE-OP / PRE-PROCEDURE SCREENING ORDER (NO ISOLATION) - Swab, Nasopharynx; Future  -     Case Request     3. Adenomatous polyp of colon, unspecified part of colon  Overview:  Added automatically from request for surgery [id number]     Orders:  -     Case Request; Standing  -     COVID PRE-OP / PRE-PROCEDURE SCREENING ORDER (NO ISOLATION) - Swab, Nasopharynx; Future  -     Case Request     4. Fatty liver  Overview:  Added automatically from request for surgery [id number]        Other orders  -     pantoprazole (PROTONIX) 40 MG EC tablet; Take 1 tablet by mouth 2 (Two) Times a Day.  Dispense: 180 tablet; Refill: 3  -     Follow Anesthesia Guidelines / Protocol; Future  -     Obtain Informed Consent; Future     She is overdue for screening EGD and colonoscopy.  I will go ahead and have her schedule these today while she is here.  GERD does seem better when she takes Protonix 40 mg twice daily.  I will go ahead and continue this and refill it for her.  Continue GERD precautions.  I think given her episodes of fecal incontinence recommend daily fiber supplementation to start.  Continue a high-fiber diet.  Gastroparesis seems well controlled when she monitors her diet.  Continue gastroparesis diet.  Patient to follow-up with rheumatology as planned.  Patient to call the office with any issues.  Patient to follow-up with me in 1 year.  Patient is agreeable to the plan.              6/10/22 - No change from the above Enzo Guerrero M.D.

## 2022-06-10 NOTE — DISCHARGE INSTRUCTIONS
For the next 24 hours patient needs to be with a responsible adult.    For 24 hours DO NOT drive, operate machinery, appliances, drink alcohol, make important decisions or sign legal documents.    Start with a light or bland diet if you are feeling sick to your stomach otherwise advance to regular diet as tolerated.    Follow recommendations on procedure report if provided by your doctor.    Call Dr Guerrero for problems [phone]    Problems may include but not limited to: large amounts of bleeding, trouble breathing, repeated vomiting, severe unrelieved pain, fever or chills.

## 2022-06-13 LAB
LAB AP CASE REPORT: NORMAL
PATH REPORT.FINAL DX SPEC: NORMAL
PATH REPORT.GROSS SPEC: NORMAL

## 2022-06-20 ENCOUNTER — OFFICE VISIT (OUTPATIENT)
Dept: FAMILY MEDICINE CLINIC | Facility: CLINIC | Age: 67
End: 2022-06-20

## 2022-06-20 ENCOUNTER — APPOINTMENT (OUTPATIENT)
Dept: WOMENS IMAGING | Facility: HOSPITAL | Age: 67
End: 2022-06-20

## 2022-06-20 ENCOUNTER — HOSPITAL ENCOUNTER (OUTPATIENT)
Dept: GENERAL RADIOLOGY | Facility: HOSPITAL | Age: 67
Discharge: HOME | End: 2022-06-20
Admitting: FAMILY MEDICINE

## 2022-06-20 VITALS
BODY MASS INDEX: 27.09 KG/M2 | WEIGHT: 147.2 LBS | TEMPERATURE: 95.3 F | DIASTOLIC BLOOD PRESSURE: 78 MMHG | HEIGHT: 62 IN | HEART RATE: 108 BPM | OXYGEN SATURATION: 96 % | RESPIRATION RATE: 18 BRPM | SYSTOLIC BLOOD PRESSURE: 128 MMHG

## 2022-06-20 DIAGNOSIS — S39.92XA INJURY OF LOW BACK, INITIAL ENCOUNTER: ICD-10-CM

## 2022-06-20 DIAGNOSIS — M54.50 ACUTE RIGHT-SIDED LOW BACK PAIN WITHOUT SCIATICA: Primary | ICD-10-CM

## 2022-06-20 PROCEDURE — 77067 SCR MAMMO BI INCL CAD: CPT | Performed by: RADIOLOGY

## 2022-06-20 PROCEDURE — 77063 BREAST TOMOSYNTHESIS BI: CPT | Performed by: RADIOLOGY

## 2022-06-20 PROCEDURE — 99214 OFFICE O/P EST MOD 30 MIN: CPT | Performed by: FAMILY MEDICINE

## 2022-06-20 PROCEDURE — 72110 X-RAY EXAM L-2 SPINE 4/>VWS: CPT

## 2022-06-20 RX ORDER — BACLOFEN 10 MG/1
10 TABLET ORAL 3 TIMES DAILY
Qty: 30 TABLET | Refills: 0 | Status: SHIPPED | OUTPATIENT
Start: 2022-06-20 | End: 2022-07-21

## 2022-06-20 RX ORDER — PREDNISONE 20 MG/1
40 TABLET ORAL DAILY
Qty: 14 TABLET | Refills: 0 | Status: SHIPPED | OUTPATIENT
Start: 2022-06-20 | End: 2022-07-21

## 2022-06-25 NOTE — PROGRESS NOTES
06/25/22       Tell her that pathology from the EGD did show some erosion in the stomach but no evidence of Helicobacter pylori lining the stomach.       The colon polyps that were removed were not cancerous and not precancerous.  The other colon biopsies came back normal.  I recommend a repeat colonoscopy in 5 years.       Please send a copy of this report to her PCP.  Sarah pratt

## 2022-06-30 ENCOUNTER — TELEPHONE (OUTPATIENT)
Dept: GASTROENTEROLOGY | Facility: CLINIC | Age: 67
End: 2022-06-30

## 2022-06-30 NOTE — TELEPHONE ENCOUNTER
----- Message from Gustavo Guerrero MD sent at 6/25/2022 12:38 PM EDT -----  06/25/22       Tell her that pathology from the EGD did show some erosion in the stomach but no evidence of Helicobacter pylori lining the stomach.       The colon polyps that were removed were not cancerous and not precancerous.  The other colon biopsies came back normal.  I recommend a repeat colonoscopy in 5 years.       Please send a copy of this report to her PCP.  Sarah pratt

## 2022-06-30 NOTE — TELEPHONE ENCOUNTER
Patient notified of results and recommendations and verbalized understanding    Result note forwarded to PCP via [facility]    Hm and cs recall placed for 6/10/27

## 2022-07-21 ENCOUNTER — OFFICE VISIT (OUTPATIENT)
Dept: FAMILY MEDICINE CLINIC | Facility: CLINIC | Age: 67
End: 2022-07-21

## 2022-07-21 VITALS
OXYGEN SATURATION: 97 % | SYSTOLIC BLOOD PRESSURE: 120 MMHG | RESPIRATION RATE: 17 BRPM | DIASTOLIC BLOOD PRESSURE: 74 MMHG | HEART RATE: 93 BPM | BODY MASS INDEX: 27.33 KG/M2 | TEMPERATURE: 98.2 F | WEIGHT: 148.5 LBS | HEIGHT: 62 IN

## 2022-07-21 DIAGNOSIS — Z00.00 MEDICARE ANNUAL WELLNESS VISIT, INITIAL: Primary | ICD-10-CM

## 2022-07-21 PROBLEM — E78.2 MIXED HYPERLIPIDEMIA: Status: ACTIVE | Noted: 2018-09-05

## 2022-07-21 PROCEDURE — G0402 INITIAL PREVENTIVE EXAM: HCPCS | Performed by: FAMILY MEDICINE

## 2022-07-21 PROCEDURE — 1160F RVW MEDS BY RX/DR IN RCRD: CPT | Performed by: FAMILY MEDICINE

## 2022-07-21 PROCEDURE — G0009 ADMIN PNEUMOCOCCAL VACCINE: HCPCS | Performed by: FAMILY MEDICINE

## 2022-07-21 PROCEDURE — 1170F FXNL STATUS ASSESSED: CPT | Performed by: FAMILY MEDICINE

## 2022-07-21 PROCEDURE — 90677 PCV20 VACCINE IM: CPT | Performed by: FAMILY MEDICINE

## 2022-07-21 NOTE — PROGRESS NOTES
The ABCs of the Annual Wellness Visit  Initial Medicare Wellness Visit    Chief Complaint   Patient presents with   • Welcome To Medicare     Subjective   History of Present Illness:  Ritu Hanson is a 67 y.o. female who presents for an Initial Medicare Wellness Visit.    The following portions of the patient's history were reviewed and   updated as appropriate: allergies, current medications, past family history, past medical history, past social history, past surgical history and problem list.     Compared to one year ago, the patient feels her physical   health is the same.  Her back is feeling much better.     Compared to one year ago, the patient feels her mental   health is the same.    Recent Hospitalizations:  She was not admitted to the hospital during the last year.       Current Medical Providers:  Patient Care Team:  Kassi Fields MD as PCP - General (Family Medicine)  Gustavo Guerrero MD as Consulting Physician (Gastroenterology)    Outpatient Medications Prior to Visit   Medication Sig Dispense Refill   • Adalimumab (Humira Pen) 40 MG/0.4ML Pen-injector Kit Inject 40 mg under the skin into the appropriate area (abdomen or thighs) as directed Every 14 (Fourteen) Days. Rotate injection sites 2 each 0   • amLODIPine (NORVASC) 10 MG tablet Take 1 tablet by mouth Daily. 90 tablet 0   • cholecalciferol (VITAMIN D3) 25 MCG (1000 UT) tablet Take 1,000 Units by mouth Daily.     • Diclofenac Sodium (VOLTAREN) 1 % gel gel apply (4G)  by topical route 4 times every day to the affected area(s) 300 g 5   • estradiol (CLIMARA) 0.1 MG/24HR patch Apply 1 (one) Patch Weekly on skin once a week 12 patch 4   • folic acid (FOLVITE) 1 MG tablet TAKE 1 TABLET BY MOUTH ONCE DAILY 90 tablet 3   • methotrexate 2.5 MG tablet TAKE 6 TABLETS BY MOUTH ONCE A WEEK (Patient taking differently: Take 15 mg by mouth 1 (One) Time Per Week. Taking 4 tablets once a week) 72 tablet 1   • methylcellulose, Laxative, (CITRUCEL) 500 MG tablet  "tablet Take 2 tablets by mouth Daily.     • pantoprazole (PROTONIX) 40 MG EC tablet Take 1 tablet by mouth 2 (Two) Times a Day. (Patient taking differently: Take 40 mg by mouth Daily.) 180 tablet 3   • PARoxetine (PAXIL) 10 MG tablet Take 0.5 tablets by mouth every night at bedtime. 45 tablet 1   • baclofen (LIORESAL) 10 MG tablet Take 1 tablet by mouth 3 (Three) Times a Day. 30 tablet 0   • methylPREDNISolone (MEDROL) 4 MG tablet Take 2 mg by mouth Daily.     • predniSONE (DELTASONE) 20 MG tablet Take 2 tablets by mouth Daily. 14 tablet 0     No facility-administered medications prior to visit.       No opioid medication identified on active medication list. I have reviewed chart for other potential  high risk medication/s and harmful drug interactions in the elderly.          Aspirin is not on active medication list.  Aspirin use is not indicated based on review of current medical condition/s. Risk of harm outweighs potential benefits.  .    Patient Active Problem List   Diagnosis   • Pain in extremity   • Gastroparesis   • Anxiety   • Degeneration of lumbar intervertebral disc   • Annular tear of lumbar disc   • Mixed hyperlipidemia   • Rheumatoid arthritis involving multiple sites with positive rheumatoid factor (HCC)   • Fatty liver   • Gastroesophageal reflux disease without esophagitis   • Adenomatous polyp of colon     Advance Care Planning  Advance Directive is not on file.  ACP discussion was held with the patient during this visit. Patient has an advance directive (not in EMR), copy requested.          Objective       Vitals:    07/21/22 1026   BP: 120/74   BP Location: Right arm   Patient Position: Sitting   Cuff Size: Small Adult   Pulse: 93   Resp: 17   Temp: 98.2 °F (36.8 °C)   TempSrc: Infrared   SpO2: 97%   Weight: 67.4 kg (148 lb 8 oz)   Height: 157.5 cm (62\")   PainSc: 0-No pain     Estimated body mass index is 27.16 kg/m² as calculated from the following:    Height as of this encounter: 157.5 cm " "(62\").    Weight as of this encounter: 67.4 kg (148 lb 8 oz).    BMI is >= 25 and <30. (Overweight) The following options were offered after discussion;: exercise counseling/recommendations and nutrition counseling/recommendations      Does the patient have evidence of cognitive impairment? No    Physical Exam  Vitals reviewed.   Constitutional:       General: She is not in acute distress.     Appearance: Normal appearance. She is not ill-appearing or toxic-appearing.   HENT:      Head: Normocephalic and atraumatic.      Right Ear: Tympanic membrane, ear canal and external ear normal. There is no impacted cerumen.      Left Ear: Tympanic membrane, ear canal and external ear normal. There is no impacted cerumen.      Nose: Nose normal.      Mouth/Throat:      Mouth: Mucous membranes are moist.      Pharynx: Oropharynx is clear. No oropharyngeal exudate or posterior oropharyngeal erythema.   Eyes:      General: No scleral icterus.        Right eye: No discharge.         Left eye: No discharge.      Conjunctiva/sclera: Conjunctivae normal.   Neck:      Thyroid: No thyromegaly.      Vascular: No carotid bruit.   Cardiovascular:      Rate and Rhythm: Normal rate and regular rhythm.      Pulses: Normal pulses.      Heart sounds: Normal heart sounds. No murmur heard.    No friction rub. No gallop.   Pulmonary:      Effort: Pulmonary effort is normal. No respiratory distress.      Breath sounds: Normal breath sounds. No wheezing or rales.   Chest:      Chest wall: No tenderness.   Abdominal:      General: Bowel sounds are normal. There is no distension.      Palpations: Abdomen is soft. There is no mass.      Tenderness: There is no abdominal tenderness. There is no guarding.   Musculoskeletal:         General: No deformity.      Cervical back: Neck supple.      Right lower leg: No edema.      Left lower leg: No edema.   Lymphadenopathy:      Cervical: No cervical adenopathy.   Skin:     Coloration: Skin is not jaundiced or " pale.   Neurological:      General: No focal deficit present.      Mental Status: She is alert and oriented to person, place, and time.      Motor: No abnormal muscle tone.      Coordination: Coordination normal.   Psychiatric:         Mood and Affect: Mood normal.         Behavior: Behavior normal.               HEALTH RISK ASSESSMENT    Smoking Status:  Social History     Tobacco Use   Smoking Status Former Smoker   • Packs/day: 0.50   • Years: 30.00   • Pack years: 15.00   • Types: Cigarettes, Cigarettes   • Quit date: 2009   • Years since quittin.5   Smokeless Tobacco Never Used     Alcohol Consumption:  Social History     Substance and Sexual Activity   Alcohol Use No     Fall Risk Screen:    ALBAADI Fall Risk Assessment was completed, and patient is at LOW risk for falls.Assessment completed on:2022    Depression Screen:   PHQ-2/PHQ-9 Depression Screening 2022   Retired PHQ-9 Total Score -   Retired Total Score -   Little Interest or Pleasure in Doing Things 0-->not at all   Feeling Down, Depressed or Hopeless 0-->not at all   PHQ-9: Brief Depression Severity Measure Score 0       Health Habits and Functional and Cognitive Screening:  Functional & Cognitive Status 2022   Do you have difficulty preparing food and eating? No   Do you have difficulty bathing yourself, getting dressed or grooming yourself? No   Do you have difficulty using the toilet? No   Do you have difficulty moving around from place to place? No   Do you have trouble with steps or getting out of a bed or a chair? No   Current Diet Well Balanced Diet   Dental Exam Up to date   Eye Exam Up to date   Exercise (times per week) 3 times per week   Current Exercises Include Walking   Do you need help using the phone?  No   Are you deaf or do you have serious difficulty hearing?  No   Do you need help with transportation? No   Do you need help shopping? No   Do you need help preparing meals?  No   Do you need help with  housework?  No   Do you need help with laundry? No   Do you need help taking your medications? No   Do you need help managing money? No   Do you ever drive or ride in a car without wearing a seat belt? No   Have you felt unusual stress, anger or loneliness in the last month? No   Who do you live with? Spouse   If you need help, do you have trouble finding someone available to you? No   Have you been bothered in the last four weeks by sexual problems? No   Do you have difficulty concentrating, remembering or making decisions? No       Age-appropriate Screening Schedule:  Refer to the list below for future screening recommendations based on patient's age, sex and/or medical conditions. Orders for these recommended tests are listed in the plan section. The patient has been provided with a written plan.    Health Maintenance   Topic Date Due   • DXA SCAN  Never done   • LIPID PANEL  07/20/2022   • INFLUENZA VACCINE  10/01/2022   • MAMMOGRAM  08/20/2023   • PAP SMEAR  06/16/2024   • TDAP/TD VACCINES (2 - Td or Tdap) 11/02/2027   • ZOSTER VACCINE  Completed            Assessment & Plan   CMS Preventative Services Quick Reference  Risk Factors Identified During Encounter  Immunizations Discussed/Encouraged (specific Immunizations; Prevnar 20 (Pneumococcal 20-valent conjugate)  The above risks/problems have been discussed with the patient.  Follow up actions/plans if indicated are seen below in the Assessment/Plan Section.  Pertinent information has been shared with the patient in the After Visit Summary.    Diagnoses and all orders for this visit:    1. Medicare annual wellness visit, initial (Primary)  -     Lipid Panel        Follow Up:  No follow-ups on file.     An After Visit Summary and PPPS were made available to the patient.           Cr 0.71  Hgb stable at 14  plt 353  Sugar was 125 but she was not fasting  White count up a little at 12.1 with neuts at 9.3  Other labs unremarkable.

## 2022-07-22 LAB
CHOLEST SERPL-MCNC: 240 MG/DL (ref 100–199)
HDLC SERPL-MCNC: 79 MG/DL
LDLC SERPL CALC-MCNC: 141 MG/DL (ref 0–99)
TRIGL SERPL-MCNC: 117 MG/DL (ref 0–149)
VLDLC SERPL CALC-MCNC: 20 MG/DL (ref 5–40)

## 2022-07-27 ENCOUNTER — OFFICE VISIT (OUTPATIENT)
Dept: GASTROENTEROLOGY | Facility: CLINIC | Age: 67
End: 2022-07-27

## 2022-07-27 VITALS
BODY MASS INDEX: 27.6 KG/M2 | SYSTOLIC BLOOD PRESSURE: 127 MMHG | TEMPERATURE: 97.5 F | DIASTOLIC BLOOD PRESSURE: 84 MMHG | HEART RATE: 83 BPM | HEIGHT: 61 IN | WEIGHT: 146.2 LBS

## 2022-07-27 DIAGNOSIS — R11.0 NAUSEA: ICD-10-CM

## 2022-07-27 DIAGNOSIS — R10.10 PAIN OF UPPER ABDOMEN: ICD-10-CM

## 2022-07-27 DIAGNOSIS — K21.9 GASTROESOPHAGEAL REFLUX DISEASE WITHOUT ESOPHAGITIS: ICD-10-CM

## 2022-07-27 DIAGNOSIS — K76.0 FATTY LIVER: ICD-10-CM

## 2022-07-27 DIAGNOSIS — D12.6 ADENOMATOUS POLYP OF COLON, UNSPECIFIED PART OF COLON: ICD-10-CM

## 2022-07-27 DIAGNOSIS — K31.84 GASTROPARESIS: Primary | ICD-10-CM

## 2022-07-27 PROCEDURE — 99214 OFFICE O/P EST MOD 30 MIN: CPT | Performed by: INTERNAL MEDICINE

## 2022-07-27 RX ORDER — PROMETHAZINE HYDROCHLORIDE 25 MG/1
25 TABLET ORAL EVERY 6 HOURS PRN
Status: DISCONTINUED | OUTPATIENT
Start: 2022-07-27 | End: 2022-08-01

## 2022-08-01 ENCOUNTER — TELEPHONE (OUTPATIENT)
Dept: GASTROENTEROLOGY | Facility: CLINIC | Age: 67
End: 2022-08-01

## 2022-08-01 RX ORDER — PROMETHAZINE HYDROCHLORIDE 25 MG/1
25 TABLET ORAL EVERY 6 HOURS PRN
Qty: 40 TABLET | Refills: 4 | Status: SHIPPED | OUTPATIENT
Start: 2022-08-01

## 2022-08-01 NOTE — TELEPHONE ENCOUNTER
See phenergan rx of 7/27 - receipt not confirmed.     Re-escribed phenergan 25 mg - 1 tab by mouth every 6 hours prn nausea, vomtiing, #40, R4.  Receipt confirmed.     Call to pt. Advise as above.      \[facility]\"" has not yet scheduled CT.  [facility] will contact to arrange.  If have not heard in timely manner, may contact them to schedule.  \[facility]\"" has their #.

## 2022-08-01 NOTE — TELEPHONE ENCOUNTER
Patient is needing the promethazine that DR. Guerrero prescribed sent to [facility] in chart. Please call [phone] if any questions.

## 2022-08-15 ENCOUNTER — TELEPHONE (OUTPATIENT)
Dept: FAMILY MEDICINE CLINIC | Facility: CLINIC | Age: 67
End: 2022-08-15

## 2022-08-15 RX ORDER — AMLODIPINE BESYLATE 10 MG/1
TABLET ORAL
Qty: 90 TABLET | Refills: 1 | Status: SHIPPED | OUTPATIENT
Start: 2022-08-15 | End: 2023-02-09 | Stop reason: SDUPTHER

## 2022-08-15 NOTE — TELEPHONE ENCOUNTER
Rx Refill Note  Requested Prescriptions     Pending Prescriptions Disp Refills   • amLODIPine (NORVASC) 10 MG tablet [Pharmacy Med Name: amLODIPine Besylate Oral Tablet 10 MG] 90 tablet 0     Sig: TAKE 1 TABLET BY MOUTH EVERY DAY      Last office visit with prescribing clinician: 7/21/2022      Next office visit with prescribing clinician: 7/24/2023            Kandice Rodriguez LPN  08/15/22, 14:27 EDT

## 2022-08-15 NOTE — TELEPHONE ENCOUNTER
Patient is going out of town this week, and will be out of medication on Wednesday. Requesting this be refilled.     amLODIPine (NORVASC) 10 MG tablet [[id number]]

## 2022-09-23 ENCOUNTER — HOSPITAL ENCOUNTER (OUTPATIENT)
Dept: CT IMAGING | Facility: HOSPITAL | Age: 67
Discharge: HOME | End: 2022-09-23
Admitting: INTERNAL MEDICINE

## 2022-09-23 DIAGNOSIS — R10.10 PAIN OF UPPER ABDOMEN: ICD-10-CM

## 2022-09-23 DIAGNOSIS — R11.0 NAUSEA: ICD-10-CM

## 2022-09-23 LAB — CREAT BLDA-MCNC: 0.6 MG/DL (ref 0.6–1.3)

## 2022-09-23 PROCEDURE — 25010000002 IOPAMIDOL 61 % SOLUTION: Performed by: INTERNAL MEDICINE

## 2022-09-23 PROCEDURE — 0 DIATRIZOATE MEGLUMINE & SODIUM PER 1 ML: Performed by: INTERNAL MEDICINE

## 2022-09-23 PROCEDURE — 82565 ASSAY OF CREATININE: CPT

## 2022-09-23 PROCEDURE — 74177 CT ABD & PELVIS W/CONTRAST: CPT

## 2022-09-23 RX ADMIN — IOPAMIDOL 85 ML: 612 INJECTION, SOLUTION INTRAVENOUS at 08:11

## 2022-09-23 RX ADMIN — DIATRIZOATE MEGLUMINE AND DIATRIZOATE SODIUM 30 ML: 600; 100 SOLUTION ORAL; RECTAL at 06:55

## 2022-09-25 NOTE — PROGRESS NOTES
09/25/22       Tell her that the CT abd/pelvis shows a normal pancreas. There are a bunch of things found (gallbladder is absent, a left kidney cyst, diverticulum off of the fundus of the stomach, calcium deposits in the wall of the aorta, old mild T12 vertebral compression fracture, etc). NOne of these things are causing her troubles. We can discuss in detail when I see her in F/U.       Send a copy of this report to her PCP.   Sarah pratt

## 2022-09-27 ENCOUNTER — TELEPHONE (OUTPATIENT)
Dept: GASTROENTEROLOGY | Facility: CLINIC | Age: 67
End: 2022-09-27

## 2022-09-27 NOTE — TELEPHONE ENCOUNTER
----- Message from Gustavo Guerrero MD sent at 9/25/2022  3:24 PM EDT -----  09/25/22       Tell her that the CT abd/pelvis shows a normal pancreas. There are a bunch of things found (gallbladder is absent, a left kidney cyst, diverticulum off of the fundus of the stomach, calcium deposits in the wall of the aorta, old mild T12 vertebral compression fracture, etc). NOne of these things are causing her troubles. We can discuss in detail when I see her in F/U.       Send a copy of this report to her PCP.   Sarah pratt

## 2022-09-29 RX ORDER — ROSUVASTATIN CALCIUM 5 MG/1
5 TABLET, COATED ORAL DAILY
Qty: 90 TABLET | Refills: 3 | Status: SHIPPED | OUTPATIENT
Start: 2022-09-29

## 2022-11-28 ENCOUNTER — TELEMEDICINE (OUTPATIENT)
Dept: FAMILY MEDICINE CLINIC | Facility: CLINIC | Age: 67
End: 2022-11-28

## 2022-11-28 DIAGNOSIS — J06.9 VIRAL URI: Primary | ICD-10-CM

## 2022-11-28 PROCEDURE — 99213 OFFICE O/P EST LOW 20 MIN: CPT | Performed by: FAMILY MEDICINE

## 2022-11-28 RX ORDER — DEXTROMETHORPHN/ACETAMINOPH/CP 10-325-2MG
TABLET ORAL
COMMUNITY

## 2022-11-28 NOTE — PROGRESS NOTES
"Chief Complaint  Cough (Cough 7 days and neg covid test yesterday 11/27/22)    Subjective        Ritu Hanson presents to [facility]  History of Present Illness     Coronavirus pandemic telehealth visit.  Good audio and video connection.  Patient gave informed consent through the [employer] check in process.    I am seeing this patient for the first time.  Video visit today.  She has had about 5 to 7 days of an upper respiratory syndrome.  Mostly coughing.  Coughing bothers her at night somewhat.  However she otherwise feels well.  She does not feel like she is getting much worse.  No shortness of breath.  No high fever.  No wheezing.  She quit smoking about 10 years ago.  She states she does not have a history of COPD or asthma.  She does have rheumatoid arthritis and continues Humira and methotrexate.  He is wondering if she can take anything other than Coricidin dextromethorphan cough syrup.  She had a negative COVID test yesterday.        Objective   Vital Signs:  There were no vitals taken for this visit.  Estimated body mass index is 27.62 kg/m² as calculated from the following:    Height as of 7/27/22: 154.9 cm (61\").    Weight as of 7/27/22: 66.3 kg (146 lb 3.2 oz).          Physical Exam  Constitutional:       Comments: Good video connection.  The patient appears in no acute distress.  She does not appear uncomfortable.  She does not look tired.  Her breathing seems comfortable.  No apparent tachypnea.  She is able to speak in complete sentences without getting short of breath.  She has a intermittent dry cough.  Her color is good.  She is alert and oriented.   Psychiatric:         Mood and Affect: Mood normal.        Result Review :                Assessment and Plan   Diagnoses and all orders for this visit:    1. Viral URI (Primary)      Probable viral upper respiratory infection.  Likely not COVID-19.  Likely not influenza.  She is almost a week into this.  There are no " concerning symptoms such as shortness of breath.  However I did explain to the patient I am concerned about her history of rheumatoid arthritis and Humira and methotrexate use.  This could predispose her to rapidly progressing pneumonia.  However at this time it is very unlikely she has lab.  She was counseled to get to the emergency room with very severe symptoms such as shortness of breath.  In the meantime she is going to continue the Coricidin.  And she will contact her primary care provider or contact me later this week if it seems like things are getting worse.  With sudden severe symptoms she was told to go to the emergency room       I spent 16 minutes caring for Ritu on this date of service. This time includes time spent by me in the following activities:preparing for the visit, reviewing tests, performing a medically appropriate examination and/or evaluation , counseling and educating the patient/family/caregiver and documenting information in the medical record  Follow Up   No follow-ups on file.  Patient was given instructions and counseling regarding her condition or for health maintenance advice. Please see specific information pulled into the AVS if appropriate.

## 2023-01-18 ENCOUNTER — OFFICE VISIT (OUTPATIENT)
Dept: GASTROENTEROLOGY | Facility: CLINIC | Age: 68
End: 2023-01-18
Payer: MEDICARE

## 2023-01-18 VITALS
WEIGHT: 144.4 LBS | HEART RATE: 91 BPM | DIASTOLIC BLOOD PRESSURE: 80 MMHG | TEMPERATURE: 97.3 F | SYSTOLIC BLOOD PRESSURE: 129 MMHG | HEIGHT: 62 IN | OXYGEN SATURATION: 99 % | BODY MASS INDEX: 26.57 KG/M2

## 2023-01-18 DIAGNOSIS — K31.84 GASTROPARESIS: Primary | ICD-10-CM

## 2023-01-18 DIAGNOSIS — D12.6 ADENOMATOUS POLYP OF COLON, UNSPECIFIED PART OF COLON: ICD-10-CM

## 2023-01-18 DIAGNOSIS — K21.9 GASTROESOPHAGEAL REFLUX DISEASE WITHOUT ESOPHAGITIS: ICD-10-CM

## 2023-01-18 PROCEDURE — 99213 OFFICE O/P EST LOW 20 MIN: CPT | Performed by: INTERNAL MEDICINE

## 2023-01-18 RX ORDER — METHYLPREDNISOLONE 4 MG/1
1 TABLET ORAL DAILY
COMMUNITY
Start: 2022-11-15

## 2023-01-18 NOTE — PROGRESS NOTES
Chief Complaint   Patient presents with   • gastroparesis       History of Present Illness:   67 y.o. female with a history of gastroesophageal reflux disease, gastroparesis (she has been seen by the  [facility]), and a history of colon polyps.  She last had an EGD and colonoscopy in 6 of 2022.  I recommended a repeat colonoscopy in 5 years.  I last saw her in 7 of 22:  Assessment:  1.  Gastroesophageal reflux disease.  2.  Gastroparesis. She is on a low dose steroids.   3.  History of colon polyps.  Her last colonoscopy was in 6 of 2022.  I had recommended a repeat colonoscopy in 5 years.  4.  Epigastric pain     Recommendations:  1. CT abd/pelvis with pancreatic protocol.  2. F/u 6 mos.   3. Continue Protonix 40 mg/day.        She had a CT of the abdomen and pelvis with IV contrast in 9 of 2022 that showed:  IMPRESSION:  Chronic incidental findings as discussed above. No  abnormality identified to account for the reported history of abdomen  pain.     This report was finalized on 9/25/2022 12:05 AM by Dr. Virgilio Guo M.D.       She feels OK. She has occasional nausea. She takes occasional phenergan prn for nausea. NO abdominal or chest pain. Weight stable. On Humira for RA. I reviewed her 11/15/22 CBC and CMP done at Dr. Ontiveros's office.  She has tried reglan and doesn't remember if it helped. She occasionally gets diarrhea. No rectal bleeding or melena. On Pantoprazole 40 mg/day and works well.     Past Medical History:   Diagnosis Date   • Adenomatous polyp of colon 03/01/2021   • Anxiety    • Arthritis 04/2018   • Bladder incontinence    • Colon polyps 01/2015    HP & TA   • Dizziness    • GERD (gastroesophageal reflux disease)    • Hypertension 7/2020   • PAC (premature atrial contraction)    • Tachycardia        Past Surgical History:   Procedure Laterality Date   • APPENDECTOMY     • BILATERAL OOPHORECTOMY     • BLADDER SLING MODIFIED, ANTERIOR AND POSTERIOR VAGINAL REPAIR  2007,2011   • BREAST  BIOPSY     • COLONOSCOPY  01/16/2015    TA w/low grade dysplasia HP x 3   • COLONOSCOPY N/A 05/06/2016    IH, multiple hyperplastic polyps, otherwise normal exam   • COLONOSCOPY N/A 06/10/2022    Procedure: COLONOSCOPY to cecum and TI with biopsies;  Surgeon: Gustavo Guerrero MD;  Location: [facility] ENDOSCOPY;  Service: Gastroenterology;  Laterality: N/A;  Pre: H/x of polyps  Post: polyps, internal hemorrhoids   • ENDOSCOPY  01/16/2015    erythematous mucosa in stomach   • ENDOSCOPY N/A 06/10/2022    Procedure: ESOPHAGOGASTRODUODENOSCOPY with bxs;  Surgeon: Gustavo Guerrero MD;  Location: [facility] ENDOSCOPY;  Service: Gastroenterology;  Laterality: N/A;  Pre: gastroparesis with nausea, GERD  Post: gastric errosion, gastric polyps, gastritis   • HYSTERECTOMY     • LAPAROSCOPIC CHOLECYSTECTOMY  2009   • TUBAL ABDOMINAL LIGATION     • UPPER GASTROINTESTINAL ENDOSCOPY  11/26/2012    erythematous mucosa in stomach   • UPPER GASTROINTESTINAL ENDOSCOPY  06/11/2012    monoilial esophagitis, erythema         Current Outpatient Medications:   •  Adalimumab (Humira Pen) 40 MG/0.4ML Pen-injector Kit, Inject 40 mg under the skin into the appropriate area (abdomen or thighs) as directed Every 14 (Fourteen) Days. Rotate injection sites, Disp: 2 each, Rfl: 0  •  amLODIPine (NORVASC) 10 MG tablet, TAKE 1 TABLET BY MOUTH EVERY DAY, Disp: 90 tablet, Rfl: 1  •  cholecalciferol (VITAMIN D3) 25 MCG (1000 UT) tablet, Take 1,000 Units by mouth Daily., Disp: , Rfl:   •  Diclofenac Sodium (VOLTAREN) 1 % gel gel, apply (4G)  by topical route 4 times every day to the affected area(s), Disp: 300 g, Rfl: 5  •  DM-APAP-CPM (Coricidin HBP) -2 MG tablet, Take  by mouth., Disp: , Rfl:   •  estradiol (CLIMARA) 0.1 MG/24HR patch, Apply 1 (one) Patch Weekly on skin once a week, Disp: 12 patch, Rfl: 4  •  folic acid (FOLVITE) 1 MG tablet, TAKE 1 TABLET BY MOUTH ONCE DAILY, Disp: 90 tablet, Rfl: 3  •  methotrexate 2.5 MG tablet, TAKE 6 TABLETS BY MOUTH ONCE  A WEEK (Patient taking differently: Take 15 mg by mouth 1 (One) Time Per Week. Taking 4 tablets once a week), Disp: 72 tablet, Rfl: 1  •  methylcellulose, Laxative, (CITRUCEL) 500 MG tablet tablet, Take 2 tablets by mouth Daily., Disp: , Rfl:   •  methylPREDNISolone (MEDROL) 4 MG tablet, Take 1 tablet by mouth Daily., Disp: , Rfl:   •  pantoprazole (PROTONIX) 40 MG EC tablet, Take 1 tablet by mouth 2 (Two) Times a Day. (Patient taking differently: Take 40 mg by mouth Daily.), Disp: 180 tablet, Rfl: 3  •  PARoxetine (PAXIL) 10 MG tablet, Take 0.5 tablets by mouth every night at bedtime., Disp: 45 tablet, Rfl: 1  •  promethazine (PHENERGAN) 25 MG tablet, Take 1 tablet by mouth Every 6 (Six) Hours As Needed for Nausea or Vomiting., Disp: 40 tablet, Rfl: 4  •  rosuvastatin (Crestor) 5 MG tablet, Take 1 tablet by mouth Daily., Disp: 90 tablet, Rfl: 3    Allergies   Allergen Reactions   • Codeine GI Intolerance       Family History   Problem Relation Age of Onset   • Diabetes Mother    • Hypertension Mother    • COPD Mother    • Colon polyps Mother    • COPD Father    • Rheum arthritis Father    • Colon polyps Father    • No Known Problems Sister    • No Known Problems Sister    • No Known Problems Sister    • No Known Problems Brother    • Colon cancer Maternal Grandfather    • Malig Hyperthermia Neg Hx        Social History     Socioeconomic History   • Marital status:    • Number of children: 2   Tobacco Use   • Smoking status: Former     Packs/day: 0.50     Years: 30.00     Pack years: 15.00     Types: Cigarettes     Quit date: 2009     Years since quittin.0   • Smokeless tobacco: Never   Vaping Use   • Vaping Use: Never used   Substance and Sexual Activity   • Alcohol use: No   • Drug use: No   • Sexual activity: Yes     Partners: Male     Birth control/protection: Patch       Review of Systems   Gastrointestinal: Negative for abdominal pain.   All other systems reviewed and are negative.    Pertinent  positives and negatives documented in the HPI and all other systems reviewed and were found to be negative.  Vitals:    01/18/23 0804   BP: 129/80   Pulse: 91   Temp: 97.3 °F (36.3 °C)   SpO2: 99%       Physical Exam  Vitals reviewed.   Constitutional:       General: She is not in acute distress.     Appearance: Normal appearance. She is well-developed. She is not diaphoretic.   HENT:      Head: Normocephalic and atraumatic. Hair is normal.      Right Ear: Hearing, tympanic membrane, ear canal and external ear normal. No decreased hearing noted. No drainage.      Left Ear: Hearing, tympanic membrane, ear canal and external ear normal. No decreased hearing noted.      Nose: Nose normal. No nasal deformity.      Mouth/Throat:      Mouth: Mucous membranes are moist.   Eyes:      General: Lids are normal.         Right eye: No discharge.         Left eye: No discharge.      Extraocular Movements: Extraocular movements intact.      Conjunctiva/sclera: Conjunctivae normal.      Pupils: Pupils are equal, round, and reactive to light.   Neck:      Thyroid: No thyromegaly.      Vascular: No JVD.      Trachea: No tracheal deviation.   Cardiovascular:      Rate and Rhythm: Normal rate and regular rhythm.      Pulses: Normal pulses.      Heart sounds: Normal heart sounds. No murmur heard.    No friction rub. No gallop.   Pulmonary:      Effort: Pulmonary effort is normal. No respiratory distress.      Breath sounds: Normal breath sounds. No wheezing or rales.   Chest:      Chest wall: No tenderness.   Abdominal:      General: Bowel sounds are normal. There is no distension.      Palpations: Abdomen is soft. There is no mass.      Tenderness: There is no abdominal tenderness. There is no guarding or rebound.      Hernia: No hernia is present.   Musculoskeletal:         General: No tenderness or deformity. Normal range of motion.      Cervical back: Normal range of motion and neck supple.   Lymphadenopathy:      Cervical: No  cervical adenopathy.   Skin:     General: Skin is warm and dry.      Findings: No erythema or rash.   Neurological:      Mental Status: She is alert and oriented to person, place, and time.      Cranial Nerves: No cranial nerve deficit.      Motor: No abnormal muscle tone.      Coordination: Coordination normal.      Deep Tendon Reflexes: Reflexes are normal and symmetric. Reflexes normal.   Psychiatric:         Mood and Affect: Mood normal.         Behavior: Behavior normal.         Thought Content: Thought content normal.         Judgment: Judgment normal.         Diagnoses and all orders for this visit:    1. Gastroparesis (Primary)    2. Gastroesophageal reflux disease without esophagitis    3. Adenomatous polyp of colon, unspecified part of colon      Assessment:  1. Gastroesophageal reflux disease. On Protonix 40 mg/day.   2.  Gastroparesis. She is on a low dose steroids.   3.  History of colon polyps.  Her last colonoscopy was in 6 of 2022.  I had recommended a repeat colonoscopy in 5 years.  4.  constipation - does well on fiber daily.     Recommendations:  1. F/u 1 yr    Return in about 1 year (around 1/18/2024).    Gustavo Guerrero MD  1/18/2023

## 2023-02-09 RX ORDER — AMLODIPINE BESYLATE 10 MG/1
TABLET ORAL
Qty: 90 TABLET | Refills: 1 | Status: SHIPPED | OUTPATIENT
Start: 2023-02-09

## 2023-02-09 NOTE — TELEPHONE ENCOUNTER
Rx Refill Note  Requested Prescriptions     Pending Prescriptions Disp Refills   • amLODIPine (NORVASC) 10 MG tablet [Pharmacy Med Name: amLODIPine Besylate Oral Tablet 10 MG] 90 tablet 0     Sig: TAKE 1 TABLET BY MOUTH EVERY DAY      Last office visit with prescribing clinician: 7/21/2022   Last telemedicine visit with prescribing clinician: 7/24/2023   Next office visit with prescribing clinician: 7/24/2023                         Would you like a call back once the refill request has been completed: [] Yes [] No    If the office needs to give you a call back, can they leave a voicemail: [] Yes [] No    Kandice Rodriguez LPN  02/09/23, 09:03 EST

## 2023-02-10 RX ORDER — AMLODIPINE BESYLATE 10 MG/1
TABLET ORAL
Qty: 90 TABLET | Refills: 0 | OUTPATIENT
Start: 2023-02-10

## 2023-03-23 NOTE — TELEPHONE ENCOUNTER
----- Message from JUSTIN Allen sent at 3/2/2021  9:21 AM EST -----  Please call the patient and let her know her liver enzymes are perfectly normal.  Very good news.  All other labs are normal as well.   [FreeTextEntry1] : pt presents for medication follow up [de-identified] : Presenting in office for follow up examination, he was seen by cardiologist this week and had ct scan of heart. His cardiologist may want to increase his atorvastatin based off of his results. He is currently doing well no complaints.

## 2023-03-30 ENCOUNTER — TELEPHONE (OUTPATIENT)
Dept: GASTROENTEROLOGY | Facility: CLINIC | Age: 68
End: 2023-03-30

## 2023-03-30 NOTE — TELEPHONE ENCOUNTER
Caller: Ritu Hanson    Relationship to patient: Self    Best call back number: [phone] (Mobile)    Patient is needing: PT IS CALLING BECAUSE FOR THE LAST 3 WEEKS SHE HAS BEEN EXPERIENCING EXTREME NAUSEA. ITS MORE FREQUENT THAN BEFORE, AND HAPPENS AFTER SHE EATS. THE SOONEST F/U APPT AVAIL IS IN MAY. PLEASE CALL PT BACK ASAP.

## 2023-03-30 NOTE — TELEPHONE ENCOUNTER
Caller: Ritu Hanson    Relationship to patient: Self    Best call back number: [phone]    Patient is needing:  MISSED A CALL FROM TRACY. PLEASE CALL HER BACK.

## 2023-03-31 NOTE — TELEPHONE ENCOUNTER
"Call to pt.  States for past 3 wks noting increasing issues with gastroparesis.  More nauseated than normal after eating.  Notes swelling/\"hardness\" under bilateral breasts.  Bowel pattern without problem, although did experience severe urgency yesterday.     States feels a little better today.      No longer following with [facility].      Requesting f/u appt with DR Guerrero - scheduled for 5/17 @ 8:15 am.  Message to DR Guerrero for any guidance in interim.      Pt states knows to go to ER if symptoms become OOC.     "

## 2023-04-01 ENCOUNTER — TELEPHONE (OUTPATIENT)
Dept: GASTROENTEROLOGY | Facility: CLINIC | Age: 68
End: 2023-04-01
Payer: MEDICARE

## 2023-04-01 DIAGNOSIS — R11.0 NAUSEA: ICD-10-CM

## 2023-04-01 DIAGNOSIS — R10.10 PAIN OF UPPER ABDOMEN: ICD-10-CM

## 2023-04-01 DIAGNOSIS — K31.84 GASTROPARESIS: Primary | ICD-10-CM

## 2023-04-01 RX ORDER — ONDANSETRON 4 MG/1
4 TABLET, ORALLY DISINTEGRATING ORAL EVERY 8 HOURS PRN
Status: DISCONTINUED | OUTPATIENT
Start: 2023-04-01 | End: 2023-04-03

## 2023-04-01 NOTE — TELEPHONE ENCOUNTER
C/o upper abdominal pain and nausea. No fevers, chills. No constipation, No diarrhea. No rectal bleeding or melena.  This is worse in the last 3 weeks. Weight stable. Denies NSAIDs use. On Humira and methotrexate. She tried Reglan and didn't help. She is afraid of Domperidone because of the cardiac side effects.   A: 1. Upper abdominal pain with nausea. Is this gastroparesis?  Rec: 1) Trial of Zofran 4 mg one po q 8 hrs prn nausea. I sent in a prescription for this.  [facility]

## 2023-04-03 ENCOUNTER — TELEPHONE (OUTPATIENT)
Dept: GASTROENTEROLOGY | Facility: CLINIC | Age: 68
End: 2023-04-03
Payer: MEDICARE

## 2023-04-03 RX ORDER — ONDANSETRON 4 MG/1
4 TABLET, FILM COATED ORAL EVERY 8 HOURS PRN
Qty: 30 TABLET | Refills: 3 | Status: SHIPPED | OUTPATIENT
Start: 2023-04-03

## 2023-04-03 NOTE — TELEPHONE ENCOUNTER
"See rx of 4/1/23 for zofran - receipt not confirmed.     See DR Guerrero notes of 4/1/23:\" Trial of Zofran 4 mg one po q 8 hrs prn nausea.\"    Escribe completed as above - receipt confirmed.  VM to pt - advise of same.    "

## 2023-04-03 NOTE — TELEPHONE ENCOUNTER
Caller: Ritu Hanson    Relationship: Self    Best call back number: [phone]    Do you require a callback: NO    PATIENT ADVISED PHARMACY HAS NOT REC RX FOR ZOFRAN.     PLEASE REVIEW MED REFILL TO [facility]

## 2023-04-04 ENCOUNTER — TELEPHONE (OUTPATIENT)
Dept: GASTROENTEROLOGY | Facility: CLINIC | Age: 68
End: 2023-04-04
Payer: MEDICARE

## 2023-04-04 NOTE — TELEPHONE ENCOUNTER
Called the pharmacy and they didn't receive this.    Gave the pharmacist, Dylan, a verbal order    vm left for pt to call back/ my chart message sent

## 2023-04-04 NOTE — TELEPHONE ENCOUNTER
----- Message from Ritu Hanson sent at 4/2/2023 12:11 PM EDT -----  Regarding: Prescription Question  Contact: [phone]  We talked Sat about sending script to [facility] they said didn't receive it...

## 2023-04-15 DIAGNOSIS — K31.84 GASTROPARESIS: ICD-10-CM

## 2023-04-15 DIAGNOSIS — K21.9 GASTROESOPHAGEAL REFLUX DISEASE WITHOUT ESOPHAGITIS: ICD-10-CM

## 2023-04-15 DIAGNOSIS — R11.0 NAUSEA: ICD-10-CM

## 2023-04-15 DIAGNOSIS — R10.10 PAIN OF UPPER ABDOMEN: Primary | ICD-10-CM

## 2023-04-17 RX ORDER — PANTOPRAZOLE SODIUM 40 MG/1
TABLET, DELAYED RELEASE ORAL
Qty: 180 TABLET | Refills: 0 | Status: SHIPPED | OUTPATIENT
Start: 2023-04-17

## 2023-05-17 ENCOUNTER — OFFICE VISIT (OUTPATIENT)
Dept: GASTROENTEROLOGY | Facility: CLINIC | Age: 68
End: 2023-05-17
Payer: MEDICARE

## 2023-05-17 VITALS
SYSTOLIC BLOOD PRESSURE: 147 MMHG | OXYGEN SATURATION: 98 % | BODY MASS INDEX: 25.83 KG/M2 | HEIGHT: 62 IN | WEIGHT: 140.4 LBS | TEMPERATURE: 96.9 F | HEART RATE: 83 BPM | DIASTOLIC BLOOD PRESSURE: 86 MMHG

## 2023-05-17 DIAGNOSIS — D12.6 ADENOMATOUS POLYP OF COLON, UNSPECIFIED PART OF COLON: ICD-10-CM

## 2023-05-17 DIAGNOSIS — K31.84 GASTROPARESIS: ICD-10-CM

## 2023-05-17 DIAGNOSIS — K76.0 FATTY LIVER: ICD-10-CM

## 2023-05-17 DIAGNOSIS — K21.9 GASTROESOPHAGEAL REFLUX DISEASE WITHOUT ESOPHAGITIS: Primary | ICD-10-CM

## 2023-05-17 DIAGNOSIS — R11.0 NAUSEA: ICD-10-CM

## 2023-05-17 PROCEDURE — 1160F RVW MEDS BY RX/DR IN RCRD: CPT | Performed by: INTERNAL MEDICINE

## 2023-05-17 PROCEDURE — 1159F MED LIST DOCD IN RCRD: CPT | Performed by: INTERNAL MEDICINE

## 2023-05-17 PROCEDURE — 99214 OFFICE O/P EST MOD 30 MIN: CPT | Performed by: INTERNAL MEDICINE

## 2023-05-17 NOTE — PROGRESS NOTES
Chief Complaint   Patient presents with   • gastroparesis       History of Present Illness:   68 y.o. female with a history of gastroesophageal reflux disease, gastroparesis (she has been seen by the  [facility]), and a history of colon polyps.  She last had an EGD and colonoscopy in 6 of 2022.  I recommended a repeat colonoscopy in 5 years.  I last saw her in 1/23:  Assessment:  1. Gastroesophageal reflux disease. On Protonix 40 mg/day.   2.  Gastroparesis. She is on a low dose steroids.   3.  History of colon polyps.  Her last colonoscopy was in 6 of 2022.  I had recommended a repeat colonoscopy in 5 years.  4.  constipation - does well on fiber daily.      Recommendations:  1. F/u 1 yr         I put a note in her chart on 4/1/2023:  C/o upper abdominal pain and nausea. No fevers, chills. No constipation, No diarrhea. No rectal bleeding or melena.  This is worse in the last 3 weeks. Weight stable. Denies NSAIDs use. On Humira and methotrexate. She tried Reglan and didn't help. She is afraid of Domperidone because of the cardiac side effects.   A: 1. Upper abdominal pain with nausea. Is this gastroparesis?  Rec: 1) Trial of Zofran 4 mg one po q 8 hrs prn nausea. I sent in a prescription for this.        NO abdominal pain but still has nausea. No fevers, chills. The zofran helped the abdominal pain. NO diarrhea. Sometimes she has anal pressure and may have some small amount of stool on the pad. This has happened for 1 mo. No consitpation. On Citrucel 1/day.        On Protonix 40 mg/day. NOt on anything for gastroparesis. [facility] y[facility] ago. Reglan didn't work. She didn't want to try Domperidone because of fear of cardiac side effects. Weight stable.       She last had a CT Abd/pelvis in 9/22.     Past Medical History:   Diagnosis Date   • Adenomatous polyp of colon 03/01/2021   • Anxiety    • Arthritis 04/2018   • Bladder incontinence    • Colon polyps 01/2015    HP & TA   • Dizziness     • GERD (gastroesophageal reflux disease)    • Hypertension 7/2020   • PAC (premature atrial contraction)    • Tachycardia        Past Surgical History:   Procedure Laterality Date   • APPENDECTOMY     • BILATERAL OOPHORECTOMY     • BLADDER SLING MODIFIED, ANTERIOR AND POSTERIOR VAGINAL REPAIR  2007,2011   • BREAST BIOPSY     • COLONOSCOPY  01/16/2015    TA w/low grade dysplasia HP x 3   • COLONOSCOPY N/A 05/06/2016    IH, multiple hyperplastic polyps, otherwise normal exam   • COLONOSCOPY N/A 06/10/2022    Procedure: COLONOSCOPY to cecum and TI with biopsies;  Surgeon: Gustavo Guerrero MD;  Location:  [facility];  Service: Gastroenterology;  Laterality: N/A;  Pre: H/x of polyps  Post: polyps, internal hemorrhoids   • ENDOSCOPY  01/16/2015    erythematous mucosa in stomach   • ENDOSCOPY N/A 06/10/2022    Procedure: ESOPHAGOGASTRODUODENOSCOPY with bxs;  Surgeon: Gustavo Guerrero MD;  Location:  [facility];  Service: Gastroenterology;  Laterality: N/A;  Pre: gastroparesis with nausea, GERD  Post: gastric errosion, gastric polyps, gastritis   • HYSTERECTOMY     • LAPAROSCOPIC CHOLECYSTECTOMY  2009   • TUBAL ABDOMINAL LIGATION     • UPPER GASTROINTESTINAL ENDOSCOPY  11/26/2012    erythematous mucosa in stomach   • UPPER GASTROINTESTINAL ENDOSCOPY  06/11/2012    monoilial esophagitis, erythema         Current Outpatient Medications:   •  Adalimumab (Humira Pen) 40 MG/0.4ML Pen-injector Kit, Inject 40 mg under the skin into the appropriate area (abdomen or thighs) as directed Every 14 (Fourteen) Days. Rotate injection sites, Disp: 2 each, Rfl: 0  •  amLODIPine (NORVASC) 10 MG tablet, TAKE 1 TABLET BY MOUTH EVERY DAY, Disp: 90 tablet, Rfl: 1  •  cholecalciferol (VITAMIN D3) 25 MCG (1000 UT) tablet, Take 1 tablet by mouth Daily., Disp: , Rfl:   •  Diclofenac Sodium (VOLTAREN) 1 % gel gel, apply (4G)  by topical route 4 times every day to the affected area(s), Disp: 300 g, Rfl: 5  •  DM-APAP-CPM (Coricidin HBP) -2  MG tablet, Take  by mouth., Disp: , Rfl:   •  estradiol (CLIMARA) 0.1 MG/24HR patch, Apply 1 (one) Patch Weekly on skin once a week, Disp: 12 patch, Rfl: 4  •  folic acid (FOLVITE) 1 MG tablet, TAKE 1 TABLET BY MOUTH ONCE DAILY, Disp: 90 tablet, Rfl: 3  •  methotrexate 2.5 MG tablet, TAKE 6 TABLETS BY MOUTH ONCE A WEEK (Patient taking differently: Take 6 tablets by mouth 1 (One) Time Per Week. Taking 4 tablets once a week), Disp: 72 tablet, Rfl: 1  •  methylcellulose, Laxative, (CITRUCEL) 500 MG tablet tablet, Take 2 tablets by mouth Daily., Disp: , Rfl:   •  methylPREDNISolone (MEDROL) 4 MG tablet, Take 1 tablet by mouth Daily., Disp: , Rfl:   •  ondansetron (Zofran) 4 MG tablet, Take 1 tablet by mouth Every 8 (Eight) Hours As Needed for Nausea or Vomiting., Disp: 30 tablet, Rfl: 3  •  pantoprazole (PROTONIX) 40 MG EC tablet, TAKE 1 TABLET BY MOUTH TWO TIMES A DAY, Disp: 180 tablet, Rfl: 0  •  PARoxetine (PAXIL) 10 MG tablet, Take 0.5 tablets by mouth every night at bedtime., Disp: 45 tablet, Rfl: 1  •  promethazine (PHENERGAN) 25 MG tablet, Take 1 tablet by mouth Every 6 (Six) Hours As Needed for Nausea or Vomiting., Disp: 40 tablet, Rfl: 4  •  rosuvastatin (Crestor) 5 MG tablet, Take 1 tablet by mouth Daily., Disp: 90 tablet, Rfl: 3    Allergies   Allergen Reactions   • Codeine GI Intolerance       Family History   Problem Relation Age of Onset   • Diabetes Mother    • Hypertension Mother    • COPD Mother    • Colon polyps Mother    • COPD Father    • Rheum arthritis Father    • Colon polyps Father    • No Known Problems Sister    • No Known Problems Sister    • No Known Problems Sister    • No Known Problems Brother    • Colon cancer Maternal Grandfather    • Malig Hyperthermia Neg Hx        Social History     Socioeconomic History   • Marital status:    • Number of children: 2   Tobacco Use   • Smoking status: Former     Packs/day: 0.50     Years: 30.00     Pack years: 15.00     Types: Cigarettes      Quit date: 2009     Years since quittin.3   • Smokeless tobacco: Never   Vaping Use   • Vaping Use: Never used   Substance and Sexual Activity   • Alcohol use: No   • Drug use: No   • Sexual activity: Yes     Partners: Male     Birth control/protection: Patch       Review of Systems   Gastrointestinal: Positive for nausea.   All other systems reviewed and are negative.    Pertinent positives and negatives documented in the HPI and all other systems reviewed and were found to be negative.  Vitals:    23 0805   BP: 147/86   Pulse: 83   Temp: 96.9 °F (36.1 °C)   SpO2: 98%       Physical Exam  Vitals reviewed.   Constitutional:       General: She is not in acute distress.     Appearance: Normal appearance. She is well-developed. She is not diaphoretic.   HENT:      Head: Normocephalic and atraumatic. Hair is normal.      Right Ear: Hearing, tympanic membrane, ear canal and external ear normal. No decreased hearing noted. No drainage.      Left Ear: Hearing, tympanic membrane, ear canal and external ear normal. No decreased hearing noted.      Nose: Nose normal. No nasal deformity.      Mouth/Throat:      Mouth: Mucous membranes are moist.   Eyes:      General: Lids are normal.         Right eye: No discharge.         Left eye: No discharge.      Extraocular Movements: Extraocular movements intact.      Conjunctiva/sclera: Conjunctivae normal.      Pupils: Pupils are equal, round, and reactive to light.   Neck:      Thyroid: No thyromegaly.      Vascular: No JVD.      Trachea: No tracheal deviation.   Cardiovascular:      Rate and Rhythm: Normal rate and regular rhythm.      Pulses: Normal pulses.      Heart sounds: Normal heart sounds. No murmur heard.    No friction rub. No gallop.   Pulmonary:      Effort: Pulmonary effort is normal. No respiratory distress.      Breath sounds: Normal breath sounds. No wheezing or rales.   Chest:      Chest wall: No tenderness.   Abdominal:      General: Bowel sounds  are normal. There is no distension.      Palpations: Abdomen is soft. There is no mass.      Tenderness: There is no abdominal tenderness. There is no guarding or rebound.      Hernia: No hernia is present.   Genitourinary:     Rectum: Normal. Guaiac result negative.      Comments: She has slightly decreased anal muscle tone.   Musculoskeletal:         General: No tenderness or deformity. Normal range of motion.      Cervical back: Normal range of motion and neck supple.   Lymphadenopathy:      Cervical: No cervical adenopathy.   Skin:     General: Skin is warm and dry.      Findings: No erythema or rash.   Neurological:      Mental Status: She is alert and oriented to person, place, and time.      Cranial Nerves: No cranial nerve deficit.      Motor: No abnormal muscle tone.      Coordination: Coordination normal.      Deep Tendon Reflexes: Reflexes are normal and symmetric. Reflexes normal.   Psychiatric:         Mood and Affect: Mood normal.         Behavior: Behavior normal.         Thought Content: Thought content normal.         Judgment: Judgment normal.         Diagnoses and all orders for this visit:    1. Gastroesophageal reflux disease without esophagitis (Primary)    2. Gastroparesis    3. Nausea    4. Adenomatous polyp of colon, unspecified part of colon    5. Fatty liver      Assessment:  1. Gastroesophageal reflux disease. On Protonix 40 mg/day.   2.  Gastroparesis.   3.  History of colon polyps.  Her last colonoscopy was in 6 of 2022.  I had recommended a repeat colonoscopy in 5 years.  4.  constipation - does well on fiber daily.   5.  RA - on MTX, humira and steroids. Sees Dr. Garth Ontiveros.  6.  Mild fatty liver noted on US abd  7. Some anal leakage with decreased anal muscle tone.  8.     Recommendations:  1. Labs? - to be done in Dr. Ontiveros's office later this week: CBC, CMP, amylase, lipase.  2.  Kegel Exercises   3. F/u 6 mos.     Return in about 6 months (around 11/17/2023).    Gustavo Guerrero  MD  5/17/2023

## 2023-05-18 LAB
AMYLASE SERPL-CCNC: 68 U/L (ref 31–110)
LIPASE SERPL-CCNC: 34 U/L (ref 14–72)

## 2023-07-24 ENCOUNTER — OFFICE VISIT (OUTPATIENT)
Dept: FAMILY MEDICINE CLINIC | Facility: CLINIC | Age: 68
End: 2023-07-24
Payer: MEDICARE

## 2023-07-24 VITALS
HEART RATE: 92 BPM | OXYGEN SATURATION: 100 % | WEIGHT: 136 LBS | SYSTOLIC BLOOD PRESSURE: 133 MMHG | HEIGHT: 62 IN | DIASTOLIC BLOOD PRESSURE: 75 MMHG | BODY MASS INDEX: 25.03 KG/M2 | TEMPERATURE: 97.5 F

## 2023-07-24 DIAGNOSIS — Z00.00 ENCOUNTER FOR ANNUAL WELLNESS VISIT (AWV) IN MEDICARE PATIENT: Primary | ICD-10-CM

## 2023-07-24 DIAGNOSIS — T14.8XXA BRUISING: ICD-10-CM

## 2023-07-24 DIAGNOSIS — I10 ESSENTIAL HYPERTENSION: ICD-10-CM

## 2023-07-24 DIAGNOSIS — E78.2 MIXED HYPERLIPIDEMIA: ICD-10-CM

## 2023-07-24 DIAGNOSIS — F41.9 ANXIETY: ICD-10-CM

## 2023-07-24 PROCEDURE — G0439 PPPS, SUBSEQ VISIT: HCPCS | Performed by: NURSE PRACTITIONER

## 2023-07-24 PROCEDURE — 1160F RVW MEDS BY RX/DR IN RCRD: CPT | Performed by: NURSE PRACTITIONER

## 2023-07-24 PROCEDURE — 1170F FXNL STATUS ASSESSED: CPT | Performed by: NURSE PRACTITIONER

## 2023-07-24 PROCEDURE — 1159F MED LIST DOCD IN RCRD: CPT | Performed by: NURSE PRACTITIONER

## 2023-07-24 RX ORDER — AMLODIPINE BESYLATE 10 MG/1
10 TABLET ORAL DAILY
Qty: 90 TABLET | Refills: 1 | Status: SHIPPED | OUTPATIENT
Start: 2023-07-24

## 2023-07-24 RX ORDER — PAROXETINE 10 MG/1
5 TABLET, FILM COATED ORAL
Qty: 45 TABLET | Refills: 1 | Status: SHIPPED | OUTPATIENT
Start: 2023-07-24

## 2023-07-24 NOTE — PROGRESS NOTES
The ABCs of the Annual Wellness Visit  Subsequent Medicare Wellness Visit    Subjective    Ritu Hanson is a 68 y.o. female who presents for a Subsequent Medicare Wellness Visit.    The following portions of the patient's history were reviewed and   updated as appropriate: allergies, current medications, past family history, past medical history, past social history, past surgical history, and problem list.    Compared to one year ago, the patient feels her physical   health is the same.    Compared to one year ago, the patient feels her mental   health is the same.    Recent Hospitalizations:  She was not admitted to the hospital during the last year.       Current Medical Providers:  Patient Care Team:  Kiara Villanueva APRN as PCP - General (Nurse Practitioner)  Gustavo Guerrero MD as Consulting Physician (Gastroenterology)    Outpatient Medications Prior to Visit   Medication Sig Dispense Refill    Adalimumab (Humira Pen) 40 MG/0.4ML Pen-injector Kit Inject 40 mg under the skin into the appropriate area (abdomen or thighs) as directed Every 14 (Fourteen) Days. Rotate injection sites 2 each 0    cholecalciferol (VITAMIN D3) 25 MCG (1000 UT) tablet Take 1 tablet by mouth Daily.      Diclofenac Sodium (VOLTAREN) 1 % gel gel apply (4G)  by topical route 4 times every day to the affected area(s) 300 g 5    DM-APAP-CPM (Coricidin HBP) -2 MG tablet Take  by mouth.      estradiol (CLIMARA) 0.1 MG/24HR patch Apply 1 (one) Patch Weekly on skin once a week 12 patch 4    folic acid (FOLVITE) 1 MG tablet TAKE 1 TABLET BY MOUTH ONCE DAILY 90 tablet 3    methotrexate 2.5 MG tablet TAKE 6 TABLETS BY MOUTH ONCE A WEEK (Patient taking differently: Take 6 tablets by mouth 1 (One) Time Per Week. Taking 4 tablets once a week) 72 tablet 1    methylcellulose, Laxative, (CITRUCEL) 500 MG tablet tablet Take 2 tablets by mouth Daily.      ondansetron (Zofran) 4 MG tablet Take 1 tablet by mouth Every 8 (Eight) Hours As Needed for  "Nausea or Vomiting. 30 tablet 3    pantoprazole (PROTONIX) 40 MG EC tablet TAKE 1 TABLET BY MOUTH TWO TIMES A  tablet 0    promethazine (PHENERGAN) 25 MG tablet Take 1 tablet by mouth Every 6 (Six) Hours As Needed for Nausea or Vomiting. 40 tablet 4    rosuvastatin (Crestor) 5 MG tablet Take 1 tablet by mouth Daily. 90 tablet 3    amLODIPine (NORVASC) 10 MG tablet TAKE 1 TABLET BY MOUTH EVERY DAY 90 tablet 1    PARoxetine (PAXIL) 10 MG tablet Take 0.5 tablets by mouth every night at bedtime. 45 tablet 1    methylPREDNISolone (MEDROL) 4 MG tablet Take 1 tablet by mouth Daily.       No facility-administered medications prior to visit.       No opioid medication identified on active medication list. I have reviewed chart for other potential  high risk medication/s and harmful drug interactions in the elderly.        Aspirin is not on active medication list.  Aspirin use is not indicated based on review of current medical condition/s. Risk of harm outweighs potential benefits.  .    Patient Active Problem List   Diagnosis    Pain in extremity    Gastroparesis    Anxiety    Degeneration of lumbar intervertebral disc    Annular tear of lumbar disc    Mixed hyperlipidemia    Rheumatoid arthritis involving multiple sites with positive rheumatoid factor    Fatty liver    Gastroesophageal reflux disease without esophagitis    Adenomatous polyp of colon     Advance Care Planning   Advance Care Planning     Advance Directive is not on file.  ACP discussion was held with the patient during this visit. Patient has an advance directive (not in EMR), copy requested.     Objective    Vitals:    07/24/23 0820   BP: 133/75   Pulse: 92   Temp: 97.5 °F (36.4 °C)   SpO2: 100%   Weight: 61.7 kg (136 lb)   Height: 157.5 cm (62\")     Estimated body mass index is 24.87 kg/m² as calculated from the following:    Height as of this encounter: 157.5 cm (62\").    Weight as of this encounter: 61.7 kg (136 lb).    BMI is within normal " parameters. No other follow-up for BMI required.      Does the patient have evidence of cognitive impairment? No          HEALTH RISK ASSESSMENT    Smoking Status:  Social History     Tobacco Use   Smoking Status Former    Packs/day: 0.50    Years: 30.00    Pack years: 15.00    Types: Cigarettes    Quit date: 2009    Years since quittin.5   Smokeless Tobacco Never     Alcohol Consumption:  Social History     Substance and Sexual Activity   Alcohol Use No     Fall Risk Screen:    STEADI Fall Risk Assessment was completed, and patient is at LOW risk for falls.Assessment completed on:2023    Depression Screenin/21/2022    10:34 AM   PHQ-2/PHQ-9 Depression Screening   Little Interest or Pleasure in Doing Things 0-->not at all   Feeling Down, Depressed or Hopeless 0-->not at all   PHQ-9: Brief Depression Severity Measure Score 0       Health Habits and Functional and Cognitive Screenin/24/2023     8:00 AM   Functional & Cognitive Status   Do you have difficulty preparing food and eating? No   Do you have difficulty bathing yourself, getting dressed or grooming yourself? No   Do you have difficulty using the toilet? No   Do you have difficulty moving around from place to place? No   Do you have trouble with steps or getting out of a bed or a chair? No   Current Diet Well Balanced Diet   Dental Exam Up to date   Eye Exam Up to date   Exercise (times per week) 3 times per week   Current Exercises Include Walking;House Cleaning;Yard Work   Do you need help using the phone?  No   Are you deaf or do you have serious difficulty hearing?  No   Do you need help to go to places out of walking distance? No   Do you need help shopping? No   Do you need help preparing meals?  No   Do you need help with housework?  No   Do you need help with laundry? No   Do you need help taking your medications? No   Do you need help managing money? No   Do you ever drive or ride in a car without wearing a seat belt?  No   Have you felt unusual stress, anger or loneliness in the last month? No   Who do you live with? Spouse   If you need help, do you have trouble finding someone available to you? No   Do you have difficulty concentrating, remembering or making decisions? No       Age-appropriate Screening Schedule:  Refer to the list below for future screening recommendations based on patient's age, sex and/or medical conditions. Orders for these recommended tests are listed in the plan section. The patient has been provided with a written plan.    Health Maintenance   Topic Date Due    DXA SCAN  Never done    LIPID PANEL  07/21/2023    COVID-19 Vaccine (6 - Pfizer series) 07/26/2023 (Originally 2/23/2023)    MAMMOGRAM  08/20/2023    INFLUENZA VACCINE  10/01/2023    PAP SMEAR  06/16/2024    ANNUAL WELLNESS VISIT  07/24/2024    COLORECTAL CANCER SCREENING  06/10/2027    TDAP/TD VACCINES (2 - Td or Tdap) 11/02/2027    HEPATITIS C SCREENING  Completed    Pneumococcal Vaccine 65+  Completed    ZOSTER VACCINE  Completed                  CMS Preventative Services Quick Reference  Risk Factors Identified During Encounter  None Identified  The above risks/problems have been discussed with the patient.  Pertinent information has been shared with the patient in the After Visit Summary.  An After Visit Summary and PPPS were made available to the patient.    Follow Up:   Next Medicare Wellness visit to be scheduled in 1 year.       Additional E&M Note during same encounter follows:  Patient has multiple medical problems which are significant and separately identifiable that require additional work above and beyond the Medicare Wellness Visit.      Chief Complaint  Establish Care (New pt, AWV)    Subjective        HPI  Ritu Hanson is also being seen today to transfer care from Dr Fields for HTN, HLD, anxiety.    No side effects from BP meds or statin.    Still has GI issues and followed by gastro-seem to be stable.    Had bone density and  "mammo-taking vitamin D-limited calcium.    Feels like anxiety and sleep well controlled with current paxil.  Sleeps well.      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  Looks forward to going to work.      No health concerns or health goals this yr other than to continue staying healthy and active.     PHQ-9 Total Score:  0  GIACOMO-7 Score:  1      Review of Systems   Constitutional:  Negative for activity change, appetite change and fatigue.   HENT:  Negative for hearing loss and trouble swallowing.    Respiratory:  Negative for cough and shortness of breath.    Cardiovascular:  Negative for chest pain, palpitations and leg swelling.   Gastrointestinal:  Positive for nausea. Negative for blood in stool and constipation (managed with current meds).   Genitourinary:  Negative for difficulty urinating and hematuria.   Musculoskeletal:  Positive for arthralgias and myalgias.   Skin:  Negative for rash.        Skin tears easily-none currently  Bruises easily   Neurological:  Negative for dizziness and weakness.   Hematological:  Bruises/bleeds easily.   Psychiatric/Behavioral:  Negative for dysphoric mood and sleep disturbance. The patient is not nervous/anxious.      Objective   Vital Signs:  /75   Pulse 92   Temp 97.5 °F (36.4 °C)   Ht 157.5 cm (62\")   Wt 61.7 kg (136 lb)   SpO2 100%   BMI 24.87 kg/m²     Physical Exam  Vitals and nursing note reviewed.   Constitutional:       General: She is not in acute distress.     Appearance: She is well-developed. She is not ill-appearing.   HENT:      Head: Normocephalic and atraumatic.      Right Ear: Tympanic membrane, ear canal and external ear normal.      Left Ear: Tympanic membrane, ear canal and external ear normal.      Mouth/Throat:      Mouth: Mucous membranes are moist.      Pharynx: Uvula midline. No posterior oropharyngeal erythema.   Eyes:      General: No scleral icterus.        Right eye: No discharge.         Left eye: No discharge.      " Conjunctiva/sclera: Conjunctivae normal.   Neck:      Thyroid: No thyromegaly.   Cardiovascular:      Rate and Rhythm: Normal rate and regular rhythm.      Heart sounds: Normal heart sounds. No murmur heard.  Pulmonary:      Effort: Pulmonary effort is normal.      Breath sounds: Normal breath sounds.   Abdominal:      General: Bowel sounds are normal.      Palpations: Abdomen is soft.      Tenderness: There is no abdominal tenderness.   Musculoskeletal:         General: No deformity.      Cervical back: Neck supple.      Comments: Gait smooth and steady   Lymphadenopathy:      Cervical: No cervical adenopathy.   Skin:     General: Skin is warm and dry.      Findings: Bruising present.   Neurological:      General: No focal deficit present.      Mental Status: She is alert and oriented to person, place, and time.   Psychiatric:         Mood and Affect: Mood normal.         Behavior: Behavior normal.         Thought Content: Thought content normal.      Comments: Very pleasant, conversant                       Assessment and Plan   Diagnoses and all orders for this visit:    1. Encounter for annual wellness visit (AWV) in Medicare patient (Primary)    2. Essential hypertension  -     amLODIPine (NORVASC) 10 MG tablet; Take 1 tablet by mouth Daily.  Dispense: 90 tablet; Refill: 1    3. Mixed hyperlipidemia  -     Lipid Panel With LDL / HDL Ratio    4. Anxiety  -     PARoxetine (PAXIL) 10 MG tablet; Take 0.5 tablets by mouth every night at bedtime.  Dispense: 45 tablet; Refill: 1    5. Bruising    Appropriate health maintenance and prevention topics specific for this patient were discussed today.  Additionally, health goals, and health concerns addressed as appropriate.  Pt was encouraged to stay up to date on recommended screenings and vaccines based on USPSTF guidelines.     HTN:  BP is excellent today.  Continue current medication.    CMP done by rheumatology reviewed showing normal renal fx and electrolytes.      HLD:  on statin.  Last lipid was last yr.  Was elevated.  Not sure if statin adjusted.  Will recheck fasting this week and adjust as needed if elevated.  Higher CVD risk with RA.      Anxiety:  controlled well with current very low dose paxil.  Will continue.     Bruising:  reviewed CBC per rheumatology that showed normal plt.  Likely related to medications.  No asa.  Recommend trial x 3 months vitamin C to see if helpful.   Avoids juices, some veges due to GI issues.      Followed by GYN and UTD on dexa, mammo, pap.  Requested records.  Discussed adequate calcium and vitamin D in diet and supplementation.     Followed by Gastro and GI issues currently stable.  Chronic nausea controlled with meds.  Has GERD, gastroparesis.  UTD on CRC screen-5 yr recall.     Recommend sun protection.  Dentures, no recent dental visits or problems.          Follow Up   No follow-ups on file.  Patient was given instructions and counseling regarding her condition or for health maintenance advice. Please see specific information pulled into the AVS if appropriate.

## 2023-07-26 LAB
CHOLEST SERPL-MCNC: 174 MG/DL (ref 0–200)
HDLC SERPL-MCNC: 92 MG/DL (ref 40–60)
LDLC SERPL CALC-MCNC: 70 MG/DL (ref 0–100)
LDLC/HDLC SERPL: 0.75 {RATIO}
TRIGL SERPL-MCNC: 63 MG/DL (ref 0–150)
VLDLC SERPL CALC-MCNC: 12 MG/DL (ref 5–40)

## 2023-07-27 RX ORDER — ROSUVASTATIN CALCIUM 5 MG/1
5 TABLET, COATED ORAL DAILY
Qty: 90 TABLET | Refills: 1 | Status: SHIPPED | OUTPATIENT
Start: 2023-07-27

## 2023-10-10 DIAGNOSIS — R11.0 NAUSEA: ICD-10-CM

## 2023-10-10 DIAGNOSIS — K21.9 GASTROESOPHAGEAL REFLUX DISEASE WITHOUT ESOPHAGITIS: ICD-10-CM

## 2023-10-10 DIAGNOSIS — K31.84 GASTROPARESIS: ICD-10-CM

## 2023-10-10 DIAGNOSIS — R10.10 PAIN OF UPPER ABDOMEN: ICD-10-CM

## 2023-10-10 RX ORDER — PANTOPRAZOLE SODIUM 40 MG/1
TABLET, DELAYED RELEASE ORAL
Qty: 180 TABLET | Refills: 0 | Status: SHIPPED | OUTPATIENT
Start: 2023-10-10

## 2023-12-27 NOTE — PROGRESS NOTES
"Initial /82 (BP Location: Right arm, Patient Position: Chair, Cuff Size: Adult Regular)   Pulse 90   Temp 97  F (36.1  C) (Tympanic)   Resp 18   Ht 1.6 m (5' 3\")   Wt 98.7 kg (217 lb 8 oz)   LMP 04/07/2023 (Within Weeks)   BMI 38.53 kg/m   Estimated body mass index is 38.53 kg/m  as calculated from the following:    Height as of this encounter: 1.6 m (5' 3\").    Weight as of this encounter: 98.7 kg (217 lb 8 oz). .    Mary Ren, SARAH    " Chief Complaint   Patient presents with   • gastroparesis   • Heartburn   • fatty liver   • Nausea       History of Present Illness:   67 y.o. female with a history of gastroesophageal reflux disease, gastroparesis, and a history of colon polyps.  She last had an EGD and colonoscopy in 6 of 2022.  After reviewing pathology I had made the following recommendations:  06/25/22       Tell her that pathology from the EGD did show some erosion in the stomach but no evidence of Helicobacter pylori lining the stomach.       The colon polyps that were removed were not cancerous and not precancerous.  The other colon biopsies came back normal.  I recommend a repeat colonoscopy in 5 years.       She still has nausea occaisonally. Phenergan 25 mg works when it happens. [facility] to [facility]. We tried various meds but she doesn't remember which ones. No abdominal or chest pain. No fevers, chills. No diarrhea or constiopation. No rectal bleeding or melena. Her weight has been stable. She doesn't know if Protonix 40 mg/day works.        Dr. Ontiveros sees her for RA and does routine labs. We reviewed her most recent labs from Dr. Ontiveros.     Past Medical History:   Diagnosis Date   • Adenomatous polyp of colon 03/01/2021   • Anxiety    • Arthritis 04/2018   • Bladder incontinence    • Colon polyps 01/2015    HP & TA   • Dizziness    • GERD (gastroesophageal reflux disease)    • Hypertension 7/2020   • PAC (premature atrial contraction)    • Tachycardia        Past Surgical History:   Procedure Laterality Date   • APPENDECTOMY     • BILATERAL OOPHORECTOMY     • BLADDER SLING MODIFIED, ANTERIOR AND POSTERIOR VAGINAL REPAIR  2007,2011   • BREAST BIOPSY     • COLONOSCOPY  01/16/2015    TA w/low grade dysplasia HP x 3   • COLONOSCOPY N/A 05/06/2016    IH, multiple hyperplastic polyps, otherwise normal exam   • COLONOSCOPY N/A 06/10/2022    Procedure: COLONOSCOPY to cecum and TI with biopsies;  Surgeon: Gustavo Guererro MD;   Location:  [facility];  Service: Gastroenterology;  Laterality: N/A;  Pre: H/x of polyps  Post: polyps, internal hemorrhoids   • ENDOSCOPY  01/16/2015    erythematous mucosa in stomach   • ENDOSCOPY N/A 06/10/2022    Procedure: ESOPHAGOGASTRODUODENOSCOPY with bxs;  Surgeon: Gustavo Guerrero MD;  Location: [facility];  Service: Gastroenterology;  Laterality: N/A;  Pre: gastroparesis with nausea, GERD  Post: gastric errosion, gastric polyps, gastritis   • HYSTERECTOMY     • LAPAROSCOPIC CHOLECYSTECTOMY  2009   • TUBAL ABDOMINAL LIGATION     • UPPER GASTROINTESTINAL ENDOSCOPY  11/26/2012    erythematous mucosa in stomach   • UPPER GASTROINTESTINAL ENDOSCOPY  06/11/2012    monoilial esophagitis, erythema         Current Outpatient Medications:   •  Adalimumab (Humira Pen) 40 MG/0.4ML Pen-injector Kit, Inject 40 mg under the skin into the appropriate area (abdomen or thighs) as directed Every 14 (Fourteen) Days. Rotate injection sites, Disp: 2 each, Rfl: 0  •  amLODIPine (NORVASC) 10 MG tablet, Take 1 tablet by mouth Daily., Disp: 90 tablet, Rfl: 0  •  cholecalciferol (VITAMIN D3) 25 MCG (1000 UT) tablet, Take 1,000 Units by mouth Daily., Disp: , Rfl:   •  Diclofenac Sodium (VOLTAREN) 1 % gel gel, apply (4G)  by topical route 4 times every day to the affected area(s), Disp: 300 g, Rfl: 5  •  estradiol (CLIMARA) 0.1 MG/24HR patch, Apply 1 (one) Patch Weekly on skin once a week, Disp: 12 patch, Rfl: 4  •  folic acid (FOLVITE) 1 MG tablet, TAKE 1 TABLET BY MOUTH ONCE DAILY, Disp: 90 tablet, Rfl: 3  •  methotrexate 2.5 MG tablet, TAKE 6 TABLETS BY MOUTH ONCE A WEEK (Patient taking differently: Take 15 mg by mouth 1 (One) Time Per Week. Taking 4 tablets once a week), Disp: 72 tablet, Rfl: 1  •  methylcellulose, Laxative, (CITRUCEL) 500 MG tablet tablet, Take 2 tablets by mouth Daily., Disp: , Rfl:   •  pantoprazole (PROTONIX) 40 MG EC tablet, Take 1 tablet by mouth 2 (Two) Times a Day. (Patient taking differently:  Take 40 mg by mouth Daily.), Disp: 180 tablet, Rfl: 3  •  PARoxetine (PAXIL) 10 MG tablet, Take 0.5 tablets by mouth every night at bedtime., Disp: 45 tablet, Rfl: 1    Current Facility-Administered Medications:   •  promethazine (PHENERGAN) tablet 25 mg, 25 mg, Oral, Q6H PRN, Gustavo Guerrero MD    Allergies   Allergen Reactions   • Codeine GI Intolerance       Family History   Problem Relation Age of Onset   • Diabetes Mother    • Hypertension Mother    • COPD Mother    • Colon polyps Mother    • COPD Father    • Rheum arthritis Father    • Colon polyps Father    • No Known Problems Sister    • No Known Problems Sister    • No Known Problems Sister    • No Known Problems Brother    • Colon cancer Maternal Grandfather    • Malig Hyperthermia Neg Hx        Social History     Socioeconomic History   • Marital status:    • Number of children: 2   Tobacco Use   • Smoking status: Former Smoker     Packs/day: 0.50     Years: 30.00     Pack years: 15.00     Types: Cigarettes     Quit date: 2009     Years since quittin.5   • Smokeless tobacco: Never Used   Vaping Use   • Vaping Use: Never used   Substance and Sexual Activity   • Alcohol use: No   • Drug use: No   • Sexual activity: Yes     Partners: Male     Birth control/protection: Patch       Review of Systems   Gastrointestinal: Positive for abdominal pain.   All other systems reviewed and are negative.    Pertinent positives and negatives documented in the HPI and all other systems reviewed and were found to be negative.  Vitals:    22 1522   BP: 127/84   Pulse: 83   Temp: 97.5 °F (36.4 °C)       Physical Exam  Vitals reviewed.   Constitutional:       General: She is not in acute distress.     Appearance: Normal appearance. She is well-developed. She is not diaphoretic.   HENT:      Head: Normocephalic and atraumatic. Hair is normal.      Right Ear: Hearing, tympanic membrane, ear canal and external ear normal. No decreased hearing noted. No  drainage.      Left Ear: Hearing, tympanic membrane, ear canal and external ear normal. No decreased hearing noted.      Nose: Nose normal. No nasal deformity.      Mouth/Throat:      Mouth: Mucous membranes are moist.   Eyes:      General: Lids are normal.         Right eye: No discharge.         Left eye: No discharge.      Extraocular Movements: Extraocular movements intact.      Conjunctiva/sclera: Conjunctivae normal.      Pupils: Pupils are equal, round, and reactive to light.   Neck:      Thyroid: No thyromegaly.      Vascular: No JVD.      Trachea: No tracheal deviation.   Cardiovascular:      Rate and Rhythm: Normal rate and regular rhythm.      Pulses: Normal pulses.      Heart sounds: Normal heart sounds. No murmur heard.    No friction rub. No gallop.   Pulmonary:      Effort: Pulmonary effort is normal. No respiratory distress.      Breath sounds: Normal breath sounds. No wheezing or rales.   Chest:      Chest wall: No tenderness.   Abdominal:      General: Bowel sounds are normal. There is no distension.      Palpations: Abdomen is soft. There is no mass.      Tenderness: There is no abdominal tenderness. There is no guarding or rebound.      Hernia: No hernia is present.   Musculoskeletal:         General: No tenderness or deformity. Normal range of motion.      Cervical back: Normal range of motion and neck supple.   Lymphadenopathy:      Cervical: No cervical adenopathy.   Skin:     General: Skin is warm and dry.      Findings: No erythema or rash.   Neurological:      Mental Status: She is alert and oriented to person, place, and time.      Cranial Nerves: No cranial nerve deficit.      Motor: No abnormal muscle tone.      Coordination: Coordination normal.      Deep Tendon Reflexes: Reflexes are normal and symmetric. Reflexes normal.   Psychiatric:         Mood and Affect: Mood normal.         Behavior: Behavior normal.         Thought Content: Thought content normal.         Judgment: Judgment  normal.         Diagnoses and all orders for this visit:    1. Gastroparesis (Primary)  -     promethazine (PHENERGAN) tablet 25 mg    2. Gastroesophageal reflux disease without esophagitis    3. Adenomatous polyp of colon, unspecified part of colon    4. Fatty liver    5. Nausea  -     promethazine (PHENERGAN) tablet 25 mg  -     CT Abdomen Pelvis With Contrast; Future    6. Pain of upper abdomen  -     CT Abdomen Pelvis With Contrast; Future      Assessment:  1.  Gastroesophageal reflux disease.  2.  Gastroparesis. She is on a low dose steroids.   3.  History of colon polyps.  Her last colonoscopy was in 6 of 2022.  I had recommended a repeat colonoscopy in 5 years.  4.  Epigastric pain    Recommendations:  1. CT abd/pelvis with pancreatic protocol.  2. F/u 6 mos.   3. Continue Protonix 40 mg/day.     Return in about 6 months (around 1/27/2023).    Gustavo Guerrero MD  7/27/2022

## 2024-02-05 DIAGNOSIS — I10 ESSENTIAL HYPERTENSION: ICD-10-CM

## 2024-02-05 RX ORDER — AMLODIPINE BESYLATE 10 MG/1
10 TABLET ORAL DAILY
Qty: 90 TABLET | Refills: 0 | Status: SHIPPED | OUTPATIENT
Start: 2024-02-05

## 2024-02-05 NOTE — TELEPHONE ENCOUNTER
Rx Refill Note  Requested Prescriptions     Pending Prescriptions Disp Refills    amLODIPine (NORVASC) 10 MG tablet [Pharmacy Med Name: amLODIPine Besylate Oral Tablet 10 MG] 90 tablet 0     Sig: TAKE 1 TABLET BY MOUTH EVERY DAY      Last office visit with prescribing clinician: 7/24/2023   Last telemedicine visit with prescribing clinician: Visit date not found   Next office visit with prescribing clinician: Visit date not found                         Would you like a call back once the refill request has been completed: [] Yes [] No    If the office needs to give you a call back, can they leave a voicemail: [] Yes [] No    Dylan Jacobsen MA  02/05/24, 14:11 EST

## 2024-02-18 ENCOUNTER — HOSPITAL ENCOUNTER (EMERGENCY)
Facility: HOSPITAL | Age: 69
Discharge: HOME | End: 2024-02-18
Attending: EMERGENCY MEDICINE | Admitting: EMERGENCY MEDICINE
Payer: MEDICARE

## 2024-02-18 ENCOUNTER — APPOINTMENT (OUTPATIENT)
Dept: GENERAL RADIOLOGY | Facility: HOSPITAL | Age: 69
End: 2024-02-18
Payer: MEDICARE

## 2024-02-18 VITALS
DIASTOLIC BLOOD PRESSURE: 63 MMHG | HEART RATE: 94 BPM | BODY MASS INDEX: 24.29 KG/M2 | RESPIRATION RATE: 18 BRPM | WEIGHT: 132 LBS | SYSTOLIC BLOOD PRESSURE: 126 MMHG | HEIGHT: 62 IN | OXYGEN SATURATION: 99 % | TEMPERATURE: 98.2 F

## 2024-02-18 DIAGNOSIS — M94.0 COSTOCHONDRITIS: Primary | ICD-10-CM

## 2024-02-18 LAB
ALBUMIN SERPL-MCNC: 4.2 G/DL (ref 3.5–5.2)
ALBUMIN/GLOB SERPL: 2.1 G/DL
ALP SERPL-CCNC: 81 U/L (ref 39–117)
ALT SERPL W P-5'-P-CCNC: 18 U/L (ref 1–33)
ANION GAP SERPL CALCULATED.3IONS-SCNC: 9.7 MMOL/L (ref 5–15)
AST SERPL-CCNC: 19 U/L (ref 1–32)
BASOPHILS # BLD AUTO: 0.02 10*3/MM3 (ref 0–0.2)
BASOPHILS NFR BLD AUTO: 0.2 % (ref 0–1.5)
BILIRUB SERPL-MCNC: 0.4 MG/DL (ref 0–1.2)
BUN SERPL-MCNC: 11 MG/DL (ref 8–23)
BUN/CREAT SERPL: 16.7 (ref 7–25)
CALCIUM SPEC-SCNC: 10 MG/DL (ref 8.6–10.5)
CHLORIDE SERPL-SCNC: 102 MMOL/L (ref 98–107)
CO2 SERPL-SCNC: 27.3 MMOL/L (ref 22–29)
CREAT SERPL-MCNC: 0.66 MG/DL (ref 0.57–1)
D DIMER PPP FEU-MCNC: 0.15 MCGFEU/ML (ref 0–0.66)
DEPRECATED RDW RBC AUTO: 49.4 FL (ref 37–54)
EGFRCR SERPLBLD CKD-EPI 2021: 96.9 ML/MIN/1.73
EOSINOPHIL # BLD AUTO: 0.09 10*3/MM3 (ref 0–0.4)
EOSINOPHIL NFR BLD AUTO: 1.1 % (ref 0.3–6.2)
ERYTHROCYTE [DISTWIDTH] IN BLOOD BY AUTOMATED COUNT: 14.4 % (ref 12.3–15.4)
GLOBULIN UR ELPH-MCNC: 2 GM/DL
GLUCOSE SERPL-MCNC: 86 MG/DL (ref 65–99)
HCT VFR BLD AUTO: 43.5 % (ref 34–46.6)
HGB BLD-MCNC: 14.1 G/DL (ref 12–15.9)
IMM GRANULOCYTES # BLD AUTO: 0.01 10*3/MM3 (ref 0–0.05)
IMM GRANULOCYTES NFR BLD AUTO: 0.1 % (ref 0–0.5)
LYMPHOCYTES # BLD AUTO: 2.3 10*3/MM3 (ref 0.7–3.1)
LYMPHOCYTES NFR BLD AUTO: 28.5 % (ref 19.6–45.3)
MCH RBC QN AUTO: 30.4 PG (ref 26.6–33)
MCHC RBC AUTO-ENTMCNC: 32.4 G/DL (ref 31.5–35.7)
MCV RBC AUTO: 93.8 FL (ref 79–97)
MONOCYTES # BLD AUTO: 0.91 10*3/MM3 (ref 0.1–0.9)
MONOCYTES NFR BLD AUTO: 11.3 % (ref 5–12)
NEUTROPHILS NFR BLD AUTO: 4.73 10*3/MM3 (ref 1.7–7)
NEUTROPHILS NFR BLD AUTO: 58.8 % (ref 42.7–76)
PLATELET # BLD AUTO: 316 10*3/MM3 (ref 140–450)
PMV BLD AUTO: 10.3 FL (ref 6–12)
POTASSIUM SERPL-SCNC: 3.9 MMOL/L (ref 3.5–5.2)
PROT SERPL-MCNC: 6.2 G/DL (ref 6–8.5)
QT INTERVAL: 405 MS
QTC INTERVAL: 470 MS
RBC # BLD AUTO: 4.64 10*6/MM3 (ref 3.77–5.28)
SODIUM SERPL-SCNC: 139 MMOL/L (ref 136–145)
TROPONIN T SERPL HS-MCNC: <6 NG/L
WBC NRBC COR # BLD AUTO: 8.06 10*3/MM3 (ref 3.4–10.8)

## 2024-02-18 PROCEDURE — 93005 ELECTROCARDIOGRAM TRACING: CPT | Performed by: PHYSICIAN ASSISTANT

## 2024-02-18 PROCEDURE — 85379 FIBRIN DEGRADATION QUANT: CPT | Performed by: PHYSICIAN ASSISTANT

## 2024-02-18 PROCEDURE — 85025 COMPLETE CBC W/AUTO DIFF WBC: CPT | Performed by: PHYSICIAN ASSISTANT

## 2024-02-18 PROCEDURE — 36415 COLL VENOUS BLD VENIPUNCTURE: CPT

## 2024-02-18 PROCEDURE — 99283 EMERGENCY DEPT VISIT LOW MDM: CPT | Performed by: PHYSICIAN ASSISTANT

## 2024-02-18 PROCEDURE — 80053 COMPREHEN METABOLIC PANEL: CPT | Performed by: PHYSICIAN ASSISTANT

## 2024-02-18 PROCEDURE — 99284 EMERGENCY DEPT VISIT MOD MDM: CPT

## 2024-02-18 PROCEDURE — 93010 ELECTROCARDIOGRAM REPORT: CPT | Performed by: INTERNAL MEDICINE

## 2024-02-18 PROCEDURE — 71046 X-RAY EXAM CHEST 2 VIEWS: CPT

## 2024-02-18 PROCEDURE — 84484 ASSAY OF TROPONIN QUANT: CPT | Performed by: PHYSICIAN ASSISTANT

## 2024-02-18 RX ORDER — NAPROXEN 500 MG/1
500 TABLET ORAL 2 TIMES DAILY WITH MEALS
Qty: 14 TABLET | Refills: 0 | Status: SHIPPED | OUTPATIENT
Start: 2024-02-18 | End: 2024-02-25

## 2024-02-18 RX ORDER — LIDOCAINE 50 MG/G
1 PATCH TOPICAL EVERY 24 HOURS
Qty: 7 PATCH | Refills: 0 | Status: SHIPPED | OUTPATIENT
Start: 2024-02-18 | End: 2024-02-25

## 2024-02-18 RX ORDER — METHYLPREDNISOLONE 4 MG/1
2 TABLET ORAL DAILY
COMMUNITY
Start: 2023-12-26

## 2024-02-18 NOTE — FSED PROVIDER NOTE
Subjective   History of Present Illness    Patient is complaining left-sided rib pain for about 3 weeks.  The pain has been persistent since then and her boss advised her to come here for further evaluation.  In addition, she is complaining of mild intermittent shortness of breath.  The rib pain is worse with deep breath and with palpation.  Denies any specific injury that caused the pain.  Denies any rash in the area.  Denies any calf pain or lower extremity swelling. Denies any hormone use.    In addition, patient reports that she works part-time at the [employer] and may injured her ribs at that time.  Denies any specific injury.    Review of Systems   Constitutional:  Negative for activity change and appetite change.   Eyes:  Negative for pain.   Respiratory:  Positive for shortness of breath. Negative for cough.    Gastrointestinal:  Negative for nausea and vomiting.   Musculoskeletal:  Positive for arthralgias.   Skin:  Negative for color change.   Neurological:  Negative for dizziness.   All other systems reviewed and are negative.      Past Medical History:   Diagnosis Date    Adenomatous polyp of colon 03/01/2021    Anxiety     Arthritis 04/2018    Bladder incontinence     Colon polyps 01/2015    HP & TA    Dizziness     Fatty liver 03/01/2021    GERD (gastroesophageal reflux disease)     Hypertension 7/2020    Mixed hyperlipidemia 09/05/2018    PAC (premature atrial contraction)     Rheumatoid arthritis     Tachycardia        Allergies   Allergen Reactions    Codeine GI Intolerance       Past Surgical History:   Procedure Laterality Date    APPENDECTOMY      BILATERAL OOPHORECTOMY      BLADDER SLING MODIFIED, ANTERIOR AND POSTERIOR VAGINAL REPAIR  2007,2011    BREAST BIOPSY      COLONOSCOPY  01/16/2015    TA w/low grade dysplasia HP x 3    COLONOSCOPY N/A 05/06/2016    IH, multiple hyperplastic polyps, otherwise normal exam    COLONOSCOPY N/A 06/10/2022    Procedure: COLONOSCOPY to cecum and TI with  biopsies;  Surgeon: Gustavo Guerrero MD;  Location: [facility] ENDOSCOPY;  Service: Gastroenterology;  Laterality: N/A;  Pre: H/x of polyps  Post: polyps, internal hemorrhoids    ENDOSCOPY  01/16/2015    erythematous mucosa in stomach    ENDOSCOPY N/A 06/10/2022    Procedure: ESOPHAGOGASTRODUODENOSCOPY with bxs;  Surgeon: Gustavo Guerrero MD;  Location: [facility] ENDOSCOPY;  Service: Gastroenterology;  Laterality: N/A;  Pre: gastroparesis with nausea, GERD  Post: gastric errosion, gastric polyps, gastritis    HYSTERECTOMY      LAPAROSCOPIC CHOLECYSTECTOMY  2009    TUBAL ABDOMINAL LIGATION      UPPER GASTROINTESTINAL ENDOSCOPY  11/26/2012    erythematous mucosa in stomach    UPPER GASTROINTESTINAL ENDOSCOPY  06/11/2012    monoilial esophagitis, erythema       Family History   Problem Relation Age of Onset    Diabetes Mother     Hypertension Mother     COPD Mother     Colon polyps Mother     COPD Father     Rheum arthritis Father     Colon polyps Father     No Known Problems Sister     No Known Problems Sister     No Known Problems Sister     No Known Problems Brother     Colon cancer Maternal Grandfather     Malig Hyperthermia Neg Hx        Social History     Socioeconomic History    Marital status:     Number of children: 2   Tobacco Use    Smoking status: Former     Packs/day: 0.50     Years: 30.00     Additional pack years: 0.00     Total pack years: 15.00     Types: Cigarettes     Quit date: 1/1/2009     Years since quitting: 15.1    Smokeless tobacco: Never   Vaping Use    Vaping Use: Never used   Substance and Sexual Activity    Alcohol use: No    Drug use: No    Sexual activity: Yes     Partners: Male     Birth control/protection: Patch           Objective   Physical Exam  Vitals and nursing note reviewed.   Constitutional:       Appearance: Normal appearance. She is normal weight.   HENT:      Head: Normocephalic and atraumatic.      Nose: Nose normal.      Mouth/Throat:      Mouth: Mucous membranes are moist.    Eyes:      Pupils: Pupils are equal, round, and reactive to light.   Cardiovascular:      Rate and Rhythm: Normal rate and regular rhythm.      Pulses: Normal pulses.      Heart sounds: Normal heart sounds.   Pulmonary:      Effort: Pulmonary effort is normal.      Breath sounds: Normal breath sounds.   Musculoskeletal:         General: Normal range of motion.      Cervical back: Normal range of motion.      Right lower leg: No edema.      Left lower leg: No edema.   Skin:     General: Skin is warm.      Comments: L anterior rib: reproducible pain; no rashes noted   Neurological:      General: No focal deficit present.      Mental Status: She is alert.   Psychiatric:         Behavior: Behavior is cooperative.         Procedures           ED Course  ED Course as of 02/18/24 1220   Sun Feb 18, 2024   1216 I discussed with patient regarding unremarkable results including unremarkable EKG, D-dimer, and troponin.  The pain is reproducible.  Will treat as musculoskeletal/costochondritis.  Patient agrees with the plan of care and all questions addressed at this time. [SS]      ED Course User Index  [SS] Vania Werner PA-C                                           Medical Decision Making  Problems Addressed:  Costochondritis: complicated acute illness or injury    Amount and/or Complexity of Data Reviewed  Labs: ordered.  Radiology: ordered.  ECG/medicine tests: ordered.    Risk  Prescription drug management.        Final diagnoses:   Costochondritis       ED Disposition  ED Disposition       ED Disposition   Discharge    Condition   Stable    Comment   --               Kiara Villanueva, APRN  [address]  [place] [id number] [phone]               Medication List        New Prescriptions      lidocaine 5 %  Commonly known as: LIDODERM  Place 1 patch on the skin as directed by provider Daily for 7 days. Remove & Discard patch within 12 hours or as directed by MD     naproxen 500 MG  tablet  Commonly known as: NAPROSYN  Take 1 tablet by mouth 2 (Two) Times a Day With Meals for 7 days.            Changed      methotrexate 2.5 MG tablet  TAKE 6 TABLETS BY MOUTH ONCE A WEEK  What changed: additional instructions               Where to Get Your Medications        These medications were sent to [id number] - [place], [place] - [address] [place] - [phone]  - [phone]   [address] [place] [address]      Phone: [phone]   lidocaine 5 %  naproxen 500 MG tablet

## 2024-02-18 NOTE — DISCHARGE INSTRUCTIONS
Light activity for the next 2 to 3 days.  Take medications as prescribed.  Follow-up with your primary care doctor next week to recheck your symptoms.  Advised that you apply the lidocaine patches to the affected area.

## 2024-03-05 NOTE — TELEPHONE ENCOUNTER
Rx Refill Note  Requested Prescriptions     Pending Prescriptions Disp Refills    rosuvastatin (CRESTOR) 5 MG tablet [Pharmacy Med Name: Rosuvastatin Calcium Oral Tablet 5 MG] 90 tablet 0     Sig: TAKE 1 TABLET BY MOUTH EVERY DAY      Last office visit with prescribing clinician: 7/24/2023   Last telemedicine visit with prescribing clinician: Visit date not found   Next office visit with prescribing clinician: 5/20/2024                         Would you like a call back once the refill request has been completed: [] Yes [] No    If the office needs to give you a call back, can they leave a voicemail: [] Yes [] No    Zina Barnett Rep  03/05/24, 09:18 EST

## 2024-03-06 RX ORDER — ROSUVASTATIN CALCIUM 5 MG/1
5 TABLET, COATED ORAL DAILY
Qty: 90 TABLET | Refills: 0 | Status: SHIPPED | OUTPATIENT
Start: 2024-03-06

## 2024-04-12 DIAGNOSIS — R11.0 NAUSEA: ICD-10-CM

## 2024-04-12 DIAGNOSIS — K21.9 GASTROESOPHAGEAL REFLUX DISEASE WITHOUT ESOPHAGITIS: ICD-10-CM

## 2024-04-12 DIAGNOSIS — K31.84 GASTROPARESIS: ICD-10-CM

## 2024-04-12 DIAGNOSIS — R10.10 PAIN OF UPPER ABDOMEN: ICD-10-CM

## 2024-04-12 RX ORDER — PANTOPRAZOLE SODIUM 40 MG/1
40 TABLET, DELAYED RELEASE ORAL 2 TIMES DAILY
Qty: 180 TABLET | Refills: 0 | OUTPATIENT
Start: 2024-04-12

## 2024-04-12 NOTE — TELEPHONE ENCOUNTER
Caller: Ritu Hanson ANUP    Relationship: Self    Best call back number: [phone]     Requested Prescriptions:   Requested Prescriptions     Pending Prescriptions Disp Refills    pantoprazole (PROTONIX) 40 MG EC tablet 180 tablet 0     Sig: Take 1 tablet by mouth 2 (Two) Times a Day.        Pharmacy where request should be sent:  [id number] [address] - [phone]  - [phone]  [phone]     Last office visit with prescribing clinician: 5/17/2023   Last telemedicine visit with prescribing clinician: Visit date not found   Next office visit with prescribing clinician: Visit date not found     Additional details provided by patient: PT IS NEEDING A REFILL. PT IS COMPLETELY OUT OF MEDICATION    Does the patient have less than a 3 day supply:  [x] Yes  [] No    Would you like a call back once the refill request has been completed: [] Yes [x] No    If the office needs to give you a call back, can they leave a voicemail: [] Yes [x] No    Zina Earl Rep   04/12/24 09:04 EDT

## 2024-04-12 NOTE — TELEPHONE ENCOUNTER
"Provider\": DR BE    Caller: JESSICA IYER    Relationship to Patient: SELF    Pharmacy: [facility]     Phone Number: [phone]    Reason for Call: PT CALLED SCHEDULED APPT 4/24. CAN WE PLEASE SUBMIT THE REFILL REQUEST FOR PANTOPRAZOLE 40MG.   ORIGINAL MESSAGE TODAY.  "

## 2024-04-15 RX ORDER — PANTOPRAZOLE SODIUM 40 MG/1
40 TABLET, DELAYED RELEASE ORAL 2 TIMES DAILY
Qty: 180 TABLET | Refills: 0 | Status: SHIPPED | OUTPATIENT
Start: 2024-04-15

## 2024-04-22 DIAGNOSIS — I10 ESSENTIAL HYPERTENSION: ICD-10-CM

## 2024-04-22 NOTE — TELEPHONE ENCOUNTER
Rx Refill Note  Requested Prescriptions     Pending Prescriptions Disp Refills    amLODIPine (NORVASC) 10 MG tablet [Pharmacy Med Name: amLODIPine Besylate Oral Tablet 10 MG] 90 tablet 1     Sig: TAKE 1 TABLET BY MOUTH EVERY DAY      Last office visit with prescribing clinician: 7/24/2023   Last telemedicine visit with prescribing clinician: Visit date not found   Next office visit with prescribing clinician: 5/20/2024                         Would you like a call back once the refill request has been completed: [] Yes [] No    If the office needs to give you a call back, can they leave a voicemail: [] Yes [] No    Zina Barnett Rep  04/22/24, 09:16 EDT

## 2024-04-23 RX ORDER — AMLODIPINE BESYLATE 10 MG/1
10 TABLET ORAL DAILY
Qty: 90 TABLET | Refills: 0 | Status: SHIPPED | OUTPATIENT
Start: 2024-04-23

## 2024-04-24 ENCOUNTER — OFFICE VISIT (OUTPATIENT)
Dept: GASTROENTEROLOGY | Facility: CLINIC | Age: 69
End: 2024-04-24
Payer: MEDICARE

## 2024-04-24 VITALS
TEMPERATURE: 97.1 F | HEART RATE: 94 BPM | HEIGHT: 62 IN | BODY MASS INDEX: 24.66 KG/M2 | SYSTOLIC BLOOD PRESSURE: 126 MMHG | DIASTOLIC BLOOD PRESSURE: 78 MMHG | WEIGHT: 134 LBS

## 2024-04-24 DIAGNOSIS — K21.9 GASTROESOPHAGEAL REFLUX DISEASE WITHOUT ESOPHAGITIS: ICD-10-CM

## 2024-04-24 DIAGNOSIS — D12.6 ADENOMATOUS POLYP OF COLON, UNSPECIFIED PART OF COLON: ICD-10-CM

## 2024-04-24 DIAGNOSIS — K76.0 FATTY LIVER: ICD-10-CM

## 2024-04-24 DIAGNOSIS — K31.84 GASTROPARESIS: Primary | ICD-10-CM

## 2024-04-24 PROCEDURE — 99213 OFFICE O/P EST LOW 20 MIN: CPT | Performed by: INTERNAL MEDICINE

## 2024-04-24 PROCEDURE — 1159F MED LIST DOCD IN RCRD: CPT | Performed by: INTERNAL MEDICINE

## 2024-04-24 PROCEDURE — 1160F RVW MEDS BY RX/DR IN RCRD: CPT | Performed by: INTERNAL MEDICINE

## 2024-04-24 NOTE — PROGRESS NOTES
Chief Complaint   Patient presents with    Heartburn       History of Present Illness:   68 y.o. female with a history of gastroesophageal reflux disease, gastroparesis (she has been seen by the  [facility]), and a history of colon polyps.  She last had an EGD and colonoscopy in 6 of 2022.  I recommended a repeat colonoscopy in 5 years.  I last saw her in 5/23:  Assessment:  1. Gastroesophageal reflux disease. On Protonix 40 mg/day.   2.  Gastroparesis.   3.  History of colon polyps.  Her last colonoscopy was in 6 of 2022.  I had recommended a repeat colonoscopy in 5 years.  4.  constipation - does well on fiber daily.   5.  RA - on MTX, humira and steroids. Sees Dr. Garth Ontiveros.  6.  Mild fatty liver noted on US abd  7. Some anal leakage with decreased anal muscle tone.  8.      Recommendations:  1. Labs? - to be done in Dr. Ontiveros's office later this week: CBC, CMP, amylase, lipase.  2.  Kegel Exercises   3. F/u 6 mos.         Doing well. Not on anything for gastroparesis. She feels good. Gets occas nausea. NO abdominal or chest pain. No diarrhea or constipation. The kegel exercises may help. Doing well at [employer]. Weight stable. On Humira, MTX, medrol 4 mg/day and works well.     Past Medical History:   Diagnosis Date    Adenomatous polyp of colon 03/01/2021    Anxiety     Arthritis 04/2018    Bladder incontinence     Colon polyps 01/2015    HP & TA    Dizziness     Fatty liver 03/01/2021    GERD (gastroesophageal reflux disease)     Hypertension 7/2020    Mixed hyperlipidemia 09/05/2018    PAC (premature atrial contraction)     Rheumatoid arthritis     Tachycardia        Past Surgical History:   Procedure Laterality Date    APPENDECTOMY      BILATERAL OOPHORECTOMY      BLADDER SLING MODIFIED, ANTERIOR AND POSTERIOR VAGINAL REPAIR  2007,2011    BREAST BIOPSY      COLONOSCOPY  01/16/2015    TA w/low grade dysplasia HP x 3    COLONOSCOPY N/A 05/06/2016    IH, multiple hyperplastic polyps, otherwise  normal exam    COLONOSCOPY N/A 06/10/2022    Procedure: COLONOSCOPY to cecum and TI with biopsies;  Surgeon: Gustavo Guerrero MD;  Location:  [facility];  Service: Gastroenterology;  Laterality: N/A;  Pre: H/x of polyps  Post: polyps, internal hemorrhoids    ENDOSCOPY  01/16/2015    erythematous mucosa in stomach    ENDOSCOPY N/A 06/10/2022    Procedure: ESOPHAGOGASTRODUODENOSCOPY with bxs;  Surgeon: Gustavo Guerrero MD;  Location:  [facility];  Service: Gastroenterology;  Laterality: N/A;  Pre: gastroparesis with nausea, GERD  Post: gastric errosion, gastric polyps, gastritis    HYSTERECTOMY      LAPAROSCOPIC CHOLECYSTECTOMY  2009    TUBAL ABDOMINAL LIGATION      UPPER GASTROINTESTINAL ENDOSCOPY  11/26/2012    erythematous mucosa in stomach    UPPER GASTROINTESTINAL ENDOSCOPY  06/11/2012    monoilial esophagitis, erythema         Current Outpatient Medications:     Adalimumab (Humira Pen) 40 MG/0.4ML Pen-injector Kit, Inject 40 mg under the skin into the appropriate area (abdomen or thighs) as directed Every 14 (Fourteen) Days. Rotate injection sites, Disp: 2 each, Rfl: 0    amLODIPine (NORVASC) 10 MG tablet, TAKE 1 TABLET BY MOUTH EVERY DAY, Disp: 90 tablet, Rfl: 0    cholecalciferol (VITAMIN D3) 25 MCG (1000 UT) tablet, Take 1 tablet by mouth Daily., Disp: , Rfl:     Diclofenac Sodium (VOLTAREN) 1 % gel gel, apply (4G)  by topical route 4 times every day to the affected area(s), Disp: 300 g, Rfl: 5    estradiol (CLIMARA) 0.1 MG/24HR patch, Apply 1 (one) Patch Weekly on skin once a week, Disp: 12 patch, Rfl: 4    folic acid (FOLVITE) 1 MG tablet, TAKE 1 TABLET BY MOUTH ONCE DAILY, Disp: 90 tablet, Rfl: 3    methotrexate 2.5 MG tablet, TAKE 6 TABLETS BY MOUTH ONCE A WEEK (Patient taking differently: Take 6 tablets by mouth 1 (One) Time Per Week. Taking 4 tablets once a week), Disp: 72 tablet, Rfl: 1    methylcellulose, Laxative, (CITRUCEL) 500 MG tablet tablet, Take 2 tablets by mouth Daily., Disp: , Rfl:      methylPREDNISolone (MEDROL) 4 MG tablet, Take 0.5 tablets by mouth Daily. with food, Disp: , Rfl:     pantoprazole (PROTONIX) 40 MG EC tablet, Take 1 tablet by mouth 2 (Two) Times a Day., Disp: 180 tablet, Rfl: 0    PARoxetine (PAXIL) 10 MG tablet, Take 0.5 tablets by mouth every night at bedtime., Disp: 45 tablet, Rfl: 1    rosuvastatin (CRESTOR) 5 MG tablet, TAKE 1 TABLET BY MOUTH EVERY DAY, Disp: 90 tablet, Rfl: 0    DM-APAP-CPM (Coricidin HBP) -2 MG tablet, Take  by mouth. (Patient not taking: Reported on 4/24/2024), Disp: , Rfl:     ondansetron (Zofran) 4 MG tablet, Take 1 tablet by mouth Every 8 (Eight) Hours As Needed for Nausea or Vomiting. (Patient not taking: Reported on 4/24/2024), Disp: 30 tablet, Rfl: 3    promethazine (PHENERGAN) 25 MG tablet, Take 1 tablet by mouth Every 6 (Six) Hours As Needed for Nausea or Vomiting. (Patient not taking: Reported on 4/24/2024), Disp: 40 tablet, Rfl: 4    Allergies   Allergen Reactions    Codeine GI Intolerance       Family History   Problem Relation Age of Onset    Diabetes Mother     Hypertension Mother     COPD Mother     Colon polyps Mother     COPD Father     Rheum arthritis Father     Colon polyps Father     No Known Problems Sister     No Known Problems Sister     No Known Problems Sister     No Known Problems Brother     Colon cancer Maternal Grandfather     Malig Hyperthermia Neg Hx        Social History     Socioeconomic History    Marital status:     Number of children: 2   Tobacco Use    Smoking status: Former     Current packs/day: 0.00     Average packs/day: 0.5 packs/day for 30.0 years (15.0 ttl pk-yrs)     Types: Cigarettes     Start date: 1/1/1979     Quit date: 1/1/2009     Years since quitting: 15.3    Smokeless tobacco: Never   Vaping Use    Vaping status: Never Used   Substance and Sexual Activity    Alcohol use: No    Drug use: No    Sexual activity: Yes     Partners: Male     Birth control/protection: Patch       Review of Systems    Gastrointestinal:  Negative for abdominal pain.   All other systems reviewed and are negative.    Pertinent positives and negatives documented in the HPI and all other systems reviewed and were found to be negative.  Vitals:    04/24/24 1455   BP: 126/78   Pulse: 94   Temp: 97.1 °F (36.2 °C)       Physical Exam  Vitals reviewed.   Constitutional:       General: She is not in acute distress.     Appearance: Normal appearance. She is well-developed. She is not diaphoretic.   HENT:      Head: Normocephalic and atraumatic. Hair is normal.      Right Ear: Hearing, tympanic membrane, ear canal and external ear normal. No decreased hearing noted. No drainage.      Left Ear: Hearing, tympanic membrane, ear canal and external ear normal. No decreased hearing noted.      Nose: Nose normal. No nasal deformity.      Mouth/Throat:      Mouth: Mucous membranes are moist.   Eyes:      General: Lids are normal.         Right eye: No discharge.         Left eye: No discharge.      Extraocular Movements: Extraocular movements intact.      Conjunctiva/sclera: Conjunctivae normal.      Pupils: Pupils are equal, round, and reactive to light.   Neck:      Thyroid: No thyromegaly.      Vascular: No JVD.      Trachea: No tracheal deviation.   Cardiovascular:      Rate and Rhythm: Normal rate and regular rhythm.      Pulses: Normal pulses.      Heart sounds: Normal heart sounds. No murmur heard.     No friction rub. No gallop.   Pulmonary:      Effort: Pulmonary effort is normal. No respiratory distress.      Breath sounds: Normal breath sounds. No wheezing or rales.   Chest:      Chest wall: No tenderness.   Abdominal:      General: Bowel sounds are normal. There is no distension.      Palpations: Abdomen is soft. There is no mass.      Tenderness: There is no abdominal tenderness. There is no guarding or rebound.      Hernia: No hernia is present.   Musculoskeletal:         General: No tenderness or deformity. Normal range of motion.       Cervical back: Normal range of motion and neck supple.   Lymphadenopathy:      Cervical: No cervical adenopathy.   Skin:     General: Skin is warm and dry.      Findings: No erythema or rash.   Neurological:      Mental Status: She is alert and oriented to person, place, and time.      Cranial Nerves: No cranial nerve deficit.      Motor: No abnormal muscle tone.      Coordination: Coordination normal.      Deep Tendon Reflexes: Reflexes are normal and symmetric. Reflexes normal.   Psychiatric:         Mood and Affect: Mood normal.         Behavior: Behavior normal.         Thought Content: Thought content normal.         Judgment: Judgment normal.         Diagnoses and all orders for this visit:    1. Gastroparesis (Primary)    2. Fatty liver    3. Gastroesophageal reflux disease without esophagitis    4. Adenomatous polyp of colon, unspecified part of colon      Assessment:  Gastroesophageal reflux disease. On Protonix 40 mg/day.    Gastroparesis. Reglan didn't help.    History of colon polyps.  Her last colonoscopy was in 6 of 2022.  I had recommended a repeat colonoscopy in 5 years.   constipation - does well on fiber daily.    RA - on MTX, humira and steroids. Sees Dr. Garth Ontiveros.   Mild fatty liver noted on US abd      Recommendations:  Continue pantoprazole   F/u 1 yr.     No follow-ups on file.    Gustavo Guerrero MD  4/24/2024

## 2024-05-20 ENCOUNTER — OFFICE VISIT (OUTPATIENT)
Dept: FAMILY MEDICINE CLINIC | Facility: CLINIC | Age: 69
End: 2024-05-20
Payer: MEDICARE

## 2024-05-20 VITALS
HEIGHT: 62 IN | SYSTOLIC BLOOD PRESSURE: 132 MMHG | OXYGEN SATURATION: 98 % | DIASTOLIC BLOOD PRESSURE: 78 MMHG | BODY MASS INDEX: 24.82 KG/M2 | HEART RATE: 78 BPM | WEIGHT: 134.9 LBS | TEMPERATURE: 97.8 F | RESPIRATION RATE: 14 BRPM

## 2024-05-20 DIAGNOSIS — M54.2 NECK PAIN: ICD-10-CM

## 2024-05-20 DIAGNOSIS — Z00.00 ENCOUNTER FOR ANNUAL WELLNESS VISIT (AWV) IN MEDICARE PATIENT: Primary | ICD-10-CM

## 2024-05-20 DIAGNOSIS — E78.2 MIXED HYPERLIPIDEMIA: Chronic | ICD-10-CM

## 2024-05-20 PROCEDURE — 99213 OFFICE O/P EST LOW 20 MIN: CPT | Performed by: NURSE PRACTITIONER

## 2024-05-20 NOTE — PROGRESS NOTES
The ABCs of the Annual Wellness Visit  Subsequent Medicare Wellness Visit    Subjective    Ritu Hanson is a 69 y.o. female who presents for a Subsequent Medicare Wellness Visit.    The following portions of the patient's history were reviewed and   updated as appropriate: allergies, current medications, past family history, past medical history, past social history, past surgical history, and problem list.    Compared to one year ago, the patient feels her physical   health is the same.    Compared to one year ago, the patient feels her mental   health is the same.    Recent Hospitalizations:  She was not admitted to the hospital during the last year.       Current Medical Providers:  Patient Care Team:  Kiara Villanueva APRN as PCP - General (Nurse Practitioner)  Gustavo Guerrero MD as Consulting Physician (Gastroenterology)  Garth Ontiveros MD as Consulting Physician (Rheumatology)    Outpatient Medications Prior to Visit   Medication Sig Dispense Refill    Adalimumab (Humira Pen) 40 MG/0.4ML Pen-injector Kit Inject 40 mg under the skin into the appropriate area (abdomen or thighs) as directed Every 14 (Fourteen) Days. Rotate injection sites 2 each 0    amLODIPine (NORVASC) 10 MG tablet TAKE 1 TABLET BY MOUTH EVERY DAY 90 tablet 0    cholecalciferol (VITAMIN D3) 25 MCG (1000 UT) tablet Take 1 tablet by mouth Daily.      Diclofenac Sodium (VOLTAREN) 1 % gel gel apply (4G)  by topical route 4 times every day to the affected area(s) 300 g 5    estradiol (CLIMARA) 0.1 MG/24HR patch Apply 1 (one) Patch Weekly on skin once a week 12 patch 4    folic acid (FOLVITE) 1 MG tablet TAKE 1 TABLET BY MOUTH ONCE DAILY 90 tablet 3    methotrexate 2.5 MG tablet TAKE 6 TABLETS BY MOUTH ONCE A WEEK (Patient taking differently: Take 6 tablets by mouth 1 (One) Time Per Week. Taking 4 tablets once a week) 72 tablet 1    methylcellulose, Laxative, (CITRUCEL) 500 MG tablet tablet Take 2 tablets by mouth Daily.       methylPREDNISolone (MEDROL) 4 MG tablet Take 0.5 tablets by mouth Daily. with food      pantoprazole (PROTONIX) 40 MG EC tablet Take 1 tablet by mouth 2 (Two) Times a Day. 180 tablet 0    PARoxetine (PAXIL) 10 MG tablet Take 0.5 tablets by mouth every night at bedtime. 45 tablet 1    rosuvastatin (CRESTOR) 5 MG tablet TAKE 1 TABLET BY MOUTH EVERY DAY 90 tablet 0    DM-APAP-CPM (Coricidin HBP) -2 MG tablet Take  by mouth. (Patient not taking: Reported on 4/24/2024)      ondansetron (Zofran) 4 MG tablet Take 1 tablet by mouth Every 8 (Eight) Hours As Needed for Nausea or Vomiting. (Patient not taking: Reported on 4/24/2024) 30 tablet 3    promethazine (PHENERGAN) 25 MG tablet Take 1 tablet by mouth Every 6 (Six) Hours As Needed for Nausea or Vomiting. (Patient not taking: Reported on 4/24/2024) 40 tablet 4     No facility-administered medications prior to visit.       No opioid medication identified on active medication list. I have reviewed chart for other potential  high risk medication/s and harmful drug interactions in the elderly.        Aspirin is not on active medication list.  Aspirin use is not indicated based on review of current medical condition/s. Risk of harm outweighs potential benefits.  .    Patient Active Problem List   Diagnosis    Pain in extremity    Gastroparesis    Anxiety    Degeneration of lumbar intervertebral disc    Annular tear of lumbar disc    Mixed hyperlipidemia    Rheumatoid arthritis involving multiple sites with positive rheumatoid factor    Fatty liver    Gastroesophageal reflux disease without esophagitis    Adenomatous polyp of colon     Advance Care Planning   Advance Care Planning     Advance Directive is not on file.  ACP discussion was held with the patient during this visit. Patient has an advance directive (not in EMR), copy requested.     Objective    Vitals:    05/20/24 0820   BP: 132/78   Pulse: 78   Resp: 14   Temp: 97.8 °F (36.6 °C)   TempSrc: Infrared   SpO2:  "98%   Weight: 61.2 kg (134 lb 14.4 oz)   Height: 157.5 cm (62\")   PainSc: 0-No pain     Estimated body mass index is 24.67 kg/m² as calculated from the following:    Height as of this encounter: 157.5 cm (62\").    Weight as of this encounter: 61.2 kg (134 lb 14.4 oz).    BMI is within normal parameters. No other follow-up for BMI required.      Does the patient have evidence of cognitive impairment? No    Lab Results   Component Value Date    CHLPL 171 2024    TRIG 59 2024    HDL 94 (H) 2024    LDL 65 2024    VLDL 12 2024        HEALTH RISK ASSESSMENT    Smoking Status:  Social History     Tobacco Use   Smoking Status Former    Current packs/day: 0.00    Average packs/day: 0.5 packs/day for 30.0 years (15.0 ttl pk-yrs)    Types: Cigarettes    Start date: 1979    Quit date: 2009    Years since quitting: 15.4    Passive exposure: Never   Smokeless Tobacco Never     Alcohol Consumption:  Social History     Substance and Sexual Activity   Alcohol Use No     Fall Risk Screen:    ALBAADI Fall Risk Assessment was completed, and patient is at LOW risk for falls.Assessment completed on:2024    Depression Screenin/20/2024    10:00 AM   PHQ-2/PHQ-9 Depression Screening   Little Interest or Pleasure in Doing Things 0-->not at all   Feeling Down, Depressed or Hopeless 0-->not at all   Trouble Falling or Staying Asleep, or Sleeping Too Much 0-->not at all   Feeling Tired or Having Little Energy 1-->several days   Poor Appetite or Overeating 0-->not at all   Feeling Bad about Yourself - or that You are a Failure or Have Let Yourself or Your Family Down 0-->not at all   Trouble Concentrating on Things, Such as Reading the Newspaper or Watching Television 0-->not at all   Moving or Speaking So Slowly that Other People Could Have Noticed? Or the Opposite - Being So Fidgety 0-->not at all   Thoughts that You Would be Better Off Dead or of Hurting Yourself in Some Way 0-->not at all "   PHQ-9: Brief Depression Severity Measure Score 1       Health Habits and Functional and Cognitive Screenin/13/2024     1:48 PM   Functional & Cognitive Status   Do you have difficulty preparing food and eating? No   Do you have difficulty bathing yourself, getting dressed or grooming yourself? No   Do you have difficulty using the toilet? No   Do you have difficulty moving around from place to place? No   Do you have trouble with steps or getting out of a bed or a chair? No   Current Diet Well Balanced Diet   Dental Exam Up to date   Eye Exam Up to date   Exercise (times per week) 4 times per week   Current Exercises Include House Cleaning;Walking;Yard Work   Do you need help using the phone?  No   Are you deaf or do you have serious difficulty hearing?  No   Do you need help to go to places out of walking distance? No   Do you need help shopping? No   Do you need help preparing meals?  No   Do you need help with housework?  No   Do you need help with laundry? No   Do you need help taking your medications? No   Do you need help managing money? No   Do you ever drive or ride in a car without wearing a seat belt? No   Have you felt unusual stress, anger or loneliness in the last month? No   Who do you live with? Spouse   If you need help, do you have trouble finding someone available to you? No   Have you been bothered in the last four weeks by sexual problems? No   Do you have difficulty concentrating, remembering or making decisions? No       Age-appropriate Screening Schedule:  Refer to the list below for future screening recommendations based on patient's age, sex and/or medical conditions. Orders for these recommended tests are listed in the plan section. The patient has been provided with a written plan.    Health Maintenance   Topic Date Due    RSV Vaccine - Adults (1 - 1-dose 60+ series) 2024 (Originally 2017)    COVID-19 Vaccine (2023-24 season) 2024 (Originally 3/6/2024)    PAP  SMEAR  06/16/2024    INFLUENZA VACCINE  08/01/2024    ANNUAL WELLNESS VISIT  05/20/2025    LIPID PANEL  05/30/2025    MAMMOGRAM  07/05/2025    DXA SCAN  07/05/2025    COLORECTAL CANCER SCREENING  06/10/2027    TDAP/TD VACCINES (2 - Td or Tdap) 11/02/2027    HEPATITIS C SCREENING  Completed    Pneumococcal Vaccine 65+  Completed    ZOSTER VACCINE  Completed                  CMS Preventative Services Quick Reference  Risk Factors Identified During Encounter  Immunizations Discussed/Encouraged: COVID19 and RSV (Respiratory Syncytial Virus)  The above risks/problems have been discussed with the patient.  Pertinent information has been shared with the patient in the After Visit Summary.  An After Visit Summary and PPPS were made available to the patient.    Follow Up:   Next Medicare Wellness visit to be scheduled in 1 year.       Additional E&M Note during same encounter follows:  Patient has multiple medical problems which are significant and separately identifiable that require additional work above and beyond the Medicare Wellness Visit.      Chief Complaint  Medicare Wellness-subsequent    Subjective        HPI  Ritu Hanson is also being seen today for AWV    History of Present Illness  The patient is a 69-year-old female who is here for her Medicare wellness.    The patient has been experiencing daily headaches for the past 3 to 4 weeks, which she suspects may be stress-related. The headaches are localized at the back of her neck, not behind her eyes, and often awaken her from sleep. Despite changing her pillow, the headaches persist. She also reports ongoing marital issues. She has no history of allergies but wears a zero-turn mask when mowing her yard. The headache subsided over the weekend, but persists. Ibuprofen has been ineffective in managing the pain.    The patient's blood pressure is consistently within the normal range.    The patient received laboratory tests from her rheumatologist, Dr. Sun  "Sterns last week. She sustained a pulled muscle in her rib and sustained bruises from friction from wearing sleeves at work. The bruises on her arms have since resolved. She denies experiencing any pain associated with her rheumatoid arthritis.    The patient is responding well to Crestor. She experiences nausea approximately every 2 to 3 days, which she attributes to her gastroparesis. She denies vomiting. She is not currently taking Zofran. She maintains good hydration. Her physical and mental health have remained stable since the previous year. She has not required overnight hospital admissions in the past year. She does not take baby aspirin. She has an advance directive at home. She denies any memory issues. She maintains a good sleep schedule. She denies experiencing shortness of breath or cough. She visits the dentist regularly. She has dentures but does not wear her lower dentures. She denies dysphagia. She denies hematuria or hematochezia. She denies urinary issues. She denies abnormal vaginal bleeding or discharge. She uses sun protection. She has an upcoming appointment with a dermatologist.    Supplemental Information  She is doing well on paroxetine 5 mg at night. She is still on steroids, methotrexate, folic acid, estradiol patch, amlodipine, and Humira.   Her sister had a heart murmur in her 30s.   She received her COVID-19 and influenza vaccines in 09/2023.            Objective   Vital Signs:  /78   Pulse 78   Temp 97.8 °F (36.6 °C) (Infrared)   Resp 14   Ht 157.5 cm (62\")   Wt 61.2 kg (134 lb 14.4 oz)   SpO2 98%   BMI 24.67 kg/m²     Physical Exam  Vitals and nursing note reviewed.   Constitutional:       General: She is not in acute distress.     Appearance: She is well-developed. She is not ill-appearing.   HENT:      Head: Normocephalic and atraumatic.      Right Ear: Tympanic membrane, ear canal and external ear normal.      Left Ear: Tympanic membrane, ear canal and external ear " normal.      Mouth/Throat:      Mouth: Mucous membranes are moist.      Pharynx: Uvula midline. No posterior oropharyngeal erythema.   Eyes:      General: No scleral icterus.        Right eye: No discharge.         Left eye: No discharge.      Conjunctiva/sclera: Conjunctivae normal.   Neck:      Thyroid: No thyromegaly.   Cardiovascular:      Rate and Rhythm: Normal rate and regular rhythm.   Pulmonary:      Effort: Pulmonary effort is normal.      Breath sounds: Normal breath sounds.   Abdominal:      General: Bowel sounds are normal.      Palpations: Abdomen is soft.      Tenderness: There is no abdominal tenderness.   Musculoskeletal:         General: No deformity.      Cervical back: Neck supple.      Comments: Gait smooth and steady   Lymphadenopathy:      Cervical: No cervical adenopathy.   Skin:     General: Skin is warm and dry.   Neurological:      General: No focal deficit present.      Mental Status: She is alert and oriented to person, place, and time.   Psychiatric:         Mood and Affect: Mood normal.         Behavior: Behavior normal.         Thought Content: Thought content normal.         Physical Exam                  Results  Laboratory Studies  Inflammatory marker was good. CBC was good, no anemia. Platelets were good. White count was good. Liver looks good. Electrolytes look good. Kidney function looks excellent.              Assessment and Plan   Diagnoses and all orders for this visit:    1. Encounter for annual wellness visit (AWV) in Medicare patient (Primary)    2. Mixed hyperlipidemia  -     Lipid Panel With LDL / HDL Ratio; Future    3. Neck pain        Assessment & Plan  1. Medicare wellness.  The patient's blood pressure is well-regulated. Her mammogram and colonoscopy are current. The patient was advised to receive her COVID-19 booster in the upcoming spring season. A comprehensive discussion regarding brain health and hearing loss was conducted. A multivitamin was recommended. The  patient was encouraged to engage in cognitive stimulation activities such as puzzles, games, and music. A lipid panel was ordered.    Appropriate health maintenance and prevention topics specific for this patient were discussed today.  Additionally, health goals, and health concerns addressed as appropriate.  Pt was encouraged to stay up to date on recommended screenings and vaccines based on USPSTF guidelines.       2. Neck pain-seem muscular-likely causing TTHA  The patient's neck pain is likely due to tension. The patient was advised to perform stretches for neck pain on YouTube. If her symptoms do not improve, she will inform us.  No red flags in exam or history.  Patient is aware of signs and symptoms of complications that require evaluation.    3.  HLD  Check lipids and adjust statin as indicated by labs.    Reviewed labs per rheumatology and discussed with patient.  No concerns labs look good.            Follow Up   Return in about 6 months (around 11/20/2024) for Recheck.  Patient was given instructions and counseling regarding her condition or for health maintenance advice. Please see specific information pulled into the AVS if appropriate.    Patient or patient representative verbalized consent for the use of Ambient Listening during the visit with  JUSTIN Smith for chart documentation. 6/3/2024  17:58 EDT

## 2024-05-28 ENCOUNTER — TELEPHONE (OUTPATIENT)
Dept: FAMILY MEDICINE CLINIC | Facility: CLINIC | Age: 69
End: 2024-05-28

## 2024-05-28 DIAGNOSIS — E78.2 MIXED HYPERLIPIDEMIA: ICD-10-CM

## 2024-05-28 NOTE — TELEPHONE ENCOUNTER
Caller: Ritu Hanson    Relationship: Self    Best call back number: [phone]     What is the best time to reach you: ANYTIME    Who are you requesting to speak with (clinical staff, provider,  specific staff member): CLINICAL     What was the call regarding: PATIENT CALLING STATING THAT SHE IS NEEDING HER LAB SENT TO THE [facility]. SHE WENT THIS MORNING AND THEY DIDN'T HAVE ANYTHING    Is it okay if the provider responds through MyChart: NO

## 2024-05-28 NOTE — TELEPHONE ENCOUNTER
Called LVM letting pt know I have faxed over lab orders to [facility] in [facility] and to give our office a call if she has any questions or concerns

## 2024-05-30 LAB
CHOLEST SERPL-MCNC: 171 MG/DL (ref 0–200)
HDLC SERPL-MCNC: 94 MG/DL (ref 40–60)
LDLC SERPL CALC-MCNC: 65 MG/DL (ref 0–100)
LDLC/HDLC SERPL: 0.69 {RATIO}
TRIGL SERPL-MCNC: 59 MG/DL (ref 0–150)
VLDLC SERPL CALC-MCNC: 12 MG/DL (ref 5–40)

## 2024-05-31 RX ORDER — ROSUVASTATIN CALCIUM 5 MG/1
5 TABLET, COATED ORAL DAILY
Qty: 90 TABLET | Refills: 1 | Status: SHIPPED | OUTPATIENT
Start: 2024-05-31

## 2024-07-24 DIAGNOSIS — I10 ESSENTIAL HYPERTENSION: ICD-10-CM

## 2024-07-24 NOTE — TELEPHONE ENCOUNTER
Rx Refill Note  Requested Prescriptions     Pending Prescriptions Disp Refills    amLODIPine (NORVASC) 10 MG tablet [Pharmacy Med Name: amLODIPine Besylate Oral Tablet 10 MG] 90 tablet 1     Sig: TAKE 1 TABLET BY MOUTH EVERY DAY      Last office visit with prescribing clinician: 5/20/2024   Last telemedicine visit with prescribing clinician: Visit date not found   Next office visit with prescribing clinician: Visit date not found                         Would you like a call back once the refill request has been completed: [] Yes [] No    If the office needs to give you a call back, can they leave a voicemail: [] Yes [] No    Zina Barnett Rep  07/24/24, 09:41 EDT

## 2024-07-25 RX ORDER — AMLODIPINE BESYLATE 10 MG/1
10 TABLET ORAL DAILY
Qty: 90 TABLET | Refills: 1 | Status: SHIPPED | OUTPATIENT
Start: 2024-07-25

## 2024-08-14 ENCOUNTER — TELEPHONE (OUTPATIENT)
Dept: GASTROENTEROLOGY | Facility: CLINIC | Age: 69
End: 2024-08-14

## 2024-08-14 ENCOUNTER — OFFICE VISIT (OUTPATIENT)
Dept: GASTROENTEROLOGY | Facility: CLINIC | Age: 69
End: 2024-08-14
Payer: MEDICARE

## 2024-08-14 VITALS
HEIGHT: 62 IN | TEMPERATURE: 97.8 F | DIASTOLIC BLOOD PRESSURE: 78 MMHG | SYSTOLIC BLOOD PRESSURE: 132 MMHG | HEART RATE: 85 BPM | WEIGHT: 134.4 LBS | BODY MASS INDEX: 24.73 KG/M2

## 2024-08-14 DIAGNOSIS — R11.0 NAUSEA: Primary | ICD-10-CM

## 2024-08-14 DIAGNOSIS — K31.84 GASTROPARESIS: ICD-10-CM

## 2024-08-14 PROCEDURE — 99214 OFFICE O/P EST MOD 30 MIN: CPT | Performed by: INTERNAL MEDICINE

## 2024-08-14 RX ORDER — PROMETHAZINE HYDROCHLORIDE 25 MG/1
12.5 TABLET ORAL EVERY 6 HOURS PRN
Qty: 40 TABLET | Refills: 4 | Status: SHIPPED | OUTPATIENT
Start: 2024-08-14

## 2024-08-14 NOTE — TELEPHONE ENCOUNTER
Hub staff attempted to follow warm transfer process and was unsuccessful     Caller: Ritu Hanson    Relationship to patient: Self    Best call back number: [phone]     Patient is needing: PHARMACY WAS SUPPOSED TO GET 2 PRESCRIPTIONS AND ONLY GOT ONE, THE ZOFRAN IS MISSING

## 2024-08-14 NOTE — PROGRESS NOTES
Chief Complaint   Patient presents with    Nausea       History of Present Illness:   69 y.o. female with a history of gastroesophageal reflux disease, gastroparesis (she has been seen by the  [facility]), and a history of colon polyps.  She last had an EGD and colonoscopy in 6 of 2022.  I recommended a repeat colonoscopy in 5 years.  I last saw her in 4/24:  Assessment:  Gastroesophageal reflux disease. On Protonix 40 mg/day.    Gastroparesis. Reglan didn't help.    History of colon polyps.  Her last colonoscopy was in 6 of 2022.  I had recommended a repeat colonoscopy in 5 years.   constipation - does well on fiber daily.    RA - on MTX, humira and steroids. Sees Dr. Garth Ontiveros.   Mild fatty liver noted on US abd        Recommendations:  Continue pantoprazole   F/u 1 yr.        C/o x 1.5 mo nausea. On phenergan 25 mg, she takes 6.25 mg po q 6 hrs. Makes her sleepy. No abd pain or chest pain. Weight stable. No constipation or diarrhea. NO rectal bleeding or melena. Denies NSAIDs use. NOnsmoker. No ETOH. Last [facility] was yrs [facility]. I reviewed her 5/24 labs from Dr. AMY Ontiveros.     Past Medical History:   Diagnosis Date    Adenomatous polyp of colon 03/01/2021    Anxiety     Arthritis 04/2018    Bladder incontinence     Colon polyps 01/2015    HP & TA    Dizziness     Fatty liver 03/01/2021    GERD (gastroesophageal reflux disease)     Hypertension 7/2020    Mixed hyperlipidemia 09/05/2018    PAC (premature atrial contraction)     Rheumatoid arthritis     Tachycardia        Past Surgical History:   Procedure Laterality Date    APPENDECTOMY      BILATERAL OOPHORECTOMY      BLADDER SLING MODIFIED, ANTERIOR AND POSTERIOR VAGINAL REPAIR  2007,2011    BREAST BIOPSY      COLONOSCOPY  01/16/2015    TA w/low grade dysplasia HP x 3    COLONOSCOPY N/A 05/06/2016    IH, multiple hyperplastic polyps, otherwise normal exam    COLONOSCOPY N/A 06/10/2022    Procedure: COLONOSCOPY to cecum and TI with  biopsies;  Surgeon: Gustavo Guerrero MD;  Location: [facility] ENDOSCOPY;  Service: Gastroenterology;  Laterality: N/A;  Pre: H/x of polyps  Post: polyps, internal hemorrhoids    ENDOSCOPY  01/16/2015    erythematous mucosa in stomach    ENDOSCOPY N/A 06/10/2022    Procedure: ESOPHAGOGASTRODUODENOSCOPY with bxs;  Surgeon: Gustavo Guerrero MD;  Location: [facility] ENDOSCOPY;  Service: Gastroenterology;  Laterality: N/A;  Pre: gastroparesis with nausea, GERD  Post: gastric errosion, gastric polyps, gastritis    HYSTERECTOMY      LAPAROSCOPIC CHOLECYSTECTOMY  2009    TUBAL ABDOMINAL LIGATION      UPPER GASTROINTESTINAL ENDOSCOPY  11/26/2012    erythematous mucosa in stomach    UPPER GASTROINTESTINAL ENDOSCOPY  06/11/2012    monoilial esophagitis, erythema         Current Outpatient Medications:     Adalimumab (Humira Pen) 40 MG/0.4ML Pen-injector Kit, Inject 40 mg under the skin into the appropriate area (abdomen or thighs) as directed Every 14 (Fourteen) Days. Rotate injection sites, Disp: 2 each, Rfl: 0    amLODIPine (NORVASC) 10 MG tablet, TAKE 1 TABLET BY MOUTH EVERY DAY, Disp: 90 tablet, Rfl: 1    cholecalciferol (VITAMIN D3) 25 MCG (1000 UT) tablet, Take 1 tablet by mouth Daily., Disp: , Rfl:     Diclofenac Sodium (VOLTAREN) 1 % gel gel, apply (4G)  by topical route 4 times every day to the affected area(s), Disp: 300 g, Rfl: 5    DM-APAP-CPM (Coricidin HBP) -2 MG tablet, Take  by mouth., Disp: , Rfl:     estradiol (CLIMARA) 0.1 MG/24HR patch, Apply 1 (one) Patch Weekly on skin once a week, Disp: 12 patch, Rfl: 4    folic acid (FOLVITE) 1 MG tablet, TAKE 1 TABLET BY MOUTH ONCE DAILY, Disp: 90 tablet, Rfl: 3    methotrexate 2.5 MG tablet, TAKE 6 TABLETS BY MOUTH ONCE A WEEK (Patient taking differently: Take 6 tablets by mouth 1 (One) Time Per Week. Taking 4 tablets once a week), Disp: 72 tablet, Rfl: 1    methylcellulose, Laxative, (CITRUCEL) 500 MG tablet tablet, Take 2 tablets by mouth Daily., Disp: , Rfl:      methylPREDNISolone (MEDROL) 4 MG tablet, Take 0.5 tablets by mouth Daily. with food, Disp: , Rfl:     ondansetron (Zofran) 4 MG tablet, Take 1 tablet by mouth Every 8 (Eight) Hours As Needed for Nausea or Vomiting., Disp: 30 tablet, Rfl: 3    pantoprazole (PROTONIX) 40 MG EC tablet, Take 1 tablet by mouth 2 (Two) Times a Day., Disp: 180 tablet, Rfl: 0    PARoxetine (PAXIL) 10 MG tablet, Take 0.5 tablets by mouth every night at bedtime., Disp: 45 tablet, Rfl: 1    promethazine (PHENERGAN) 25 MG tablet, Take 0.5 tablets by mouth Every 6 (Six) Hours As Needed for Nausea or Vomiting., Disp: 40 tablet, Rfl: 4    rosuvastatin (CRESTOR) 5 MG tablet, Take 1 tablet by mouth Daily., Disp: 90 tablet, Rfl: 1    Allergies   Allergen Reactions    Codeine GI Intolerance       Family History   Problem Relation Age of Onset    Diabetes Mother     Hypertension Mother     COPD Mother     Colon polyps Mother     COPD Father     Rheum arthritis Father     Colon polyps Father     No Known Problems Sister     No Known Problems Sister     No Known Problems Sister     No Known Problems Brother     Colon cancer Maternal Grandfather     Malig Hyperthermia Neg Hx        Social History     Socioeconomic History    Marital status:     Number of children: 2   Tobacco Use    Smoking status: Former     Current packs/day: 0.00     Average packs/day: 0.5 packs/day for 30.0 years (15.0 ttl pk-yrs)     Types: Cigarettes     Start date: 1/1/1979     Quit date: 1/1/2009     Years since quitting: 15.6     Passive exposure: Never    Smokeless tobacco: Never   Vaping Use    Vaping status: Never Used   Substance and Sexual Activity    Alcohol use: No    Drug use: No    Sexual activity: Yes     Partners: Male     Birth control/protection: Patch       Review of Systems   Gastrointestinal:  Positive for nausea. Negative for abdominal pain.   All other systems reviewed and are negative.    Pertinent positives and negatives documented in the HPI and all  other systems reviewed and were found to be negative.  Vitals:    08/14/24 0857   BP: 132/78   Pulse: 85   Temp: 97.8 °F (36.6 °C)       Physical Exam  Vitals reviewed.   Constitutional:       General: She is not in acute distress.     Appearance: Normal appearance. She is well-developed. She is not diaphoretic.   HENT:      Head: Normocephalic and atraumatic. Hair is normal.      Right Ear: Hearing, tympanic membrane, ear canal and external ear normal. No decreased hearing noted. No drainage.      Left Ear: Hearing, tympanic membrane, ear canal and external ear normal. No decreased hearing noted.      Nose: Nose normal. No nasal deformity.      Mouth/Throat:      Mouth: Mucous membranes are moist.   Eyes:      General: Lids are normal.         Right eye: No discharge.         Left eye: No discharge.      Extraocular Movements: Extraocular movements intact.      Conjunctiva/sclera: Conjunctivae normal.      Pupils: Pupils are equal, round, and reactive to light.   Neck:      Thyroid: No thyromegaly.      Vascular: No JVD.      Trachea: No tracheal deviation.   Cardiovascular:      Rate and Rhythm: Normal rate and regular rhythm.      Pulses: Normal pulses.      Heart sounds: Normal heart sounds. No murmur heard.     No friction rub. No gallop.   Pulmonary:      Effort: Pulmonary effort is normal. No respiratory distress.      Breath sounds: Normal breath sounds. No wheezing or rales.   Chest:      Chest wall: No tenderness.   Abdominal:      General: Bowel sounds are normal. There is no distension.      Palpations: Abdomen is soft. There is no mass.      Tenderness: There is no abdominal tenderness. There is no guarding or rebound.      Hernia: No hernia is present.   Musculoskeletal:         General: No tenderness or deformity. Normal range of motion.      Cervical back: Normal range of motion and neck supple.   Lymphadenopathy:      Cervical: No cervical adenopathy.   Skin:     General: Skin is warm and dry.       Findings: No erythema or rash.   Neurological:      Mental Status: She is alert and oriented to person, place, and time.      Cranial Nerves: No cranial nerve deficit.      Motor: No abnormal muscle tone.      Coordination: Coordination normal.      Deep Tendon Reflexes: Reflexes are normal and symmetric. Reflexes normal.   Psychiatric:         Mood and Affect: Mood normal.         Behavior: Behavior normal.         Thought Content: Thought content normal.         Judgment: Judgment normal.         Diagnoses and all orders for this visit:    1. Nausea (Primary)  -     promethazine (PHENERGAN) 25 MG tablet; Take 0.5 tablets by mouth Every 6 (Six) Hours As Needed for Nausea or Vomiting.  Dispense: 40 tablet; Refill: 4  -     FL Upper GI Double Contrast & SBFT; Future    2. Gastroparesis  -     promethazine (PHENERGAN) 25 MG tablet; Take 0.5 tablets by mouth Every 6 (Six) Hours As Needed for Nausea or Vomiting.  Dispense: 40 tablet; Refill: 4  -     FL Upper GI Double Contrast & SBFT; Future      Assessment:  Nausea  H/o gastroparesis. Reglan didn't work and she was scared of Domperidone      Recommendations:  Take Zofran 4 mg po q 8 hrs prn.    UGI with SBFT.  F/u 3 mos.     No follow-ups on file.    Gustavo Guerrero MD  8/14/2024

## 2024-08-15 ENCOUNTER — TELEPHONE (OUTPATIENT)
Dept: GASTROENTEROLOGY | Facility: CLINIC | Age: 69
End: 2024-08-15

## 2024-08-15 NOTE — TELEPHONE ENCOUNTER
Pt called and she saw Dr herrera yesterday and she said that he told her that he was going to prescribe Zofran for her.  I don't see it in the chart and she said that her pharmacy doesn't have it yet.  Will you check on it and call her back.  [phone]

## 2024-08-16 RX ORDER — ONDANSETRON 4 MG/1
4 TABLET, FILM COATED ORAL EVERY 8 HOURS PRN
Qty: 45 TABLET | Refills: 3 | Status: SHIPPED | OUTPATIENT
Start: 2024-08-16

## 2024-09-18 ENCOUNTER — HOSPITAL ENCOUNTER (OUTPATIENT)
Dept: GENERAL RADIOLOGY | Facility: HOSPITAL | Age: 69
Discharge: HOME | End: 2024-09-18
Admitting: INTERNAL MEDICINE
Payer: MEDICARE

## 2024-09-18 DIAGNOSIS — R11.0 NAUSEA: ICD-10-CM

## 2024-09-18 DIAGNOSIS — K31.84 GASTROPARESIS: ICD-10-CM

## 2024-09-18 PROCEDURE — 74248 X-RAY SM INT F-THRU STD: CPT

## 2024-09-18 PROCEDURE — 74246 X-RAY XM UPR GI TRC 2CNTRST: CPT

## 2024-09-18 RX ADMIN — BARIUM SULFATE 183 ML: 960 POWDER, FOR SUSPENSION ORAL at 08:15

## 2024-09-18 RX ADMIN — BARIUM SULFATE 700 MG: 700 TABLET ORAL at 08:15

## 2024-09-18 RX ADMIN — BARIUM SULFATE 135 ML: 980 POWDER, FOR SUSPENSION ORAL at 08:48

## 2024-09-18 RX ADMIN — ANTACID/ANTIFLATULENT 1 PACKET: 380; 550; 10; 10 GRANULE, EFFERVESCENT ORAL at 08:15

## 2024-10-06 DIAGNOSIS — R11.0 NAUSEA: ICD-10-CM

## 2024-10-06 DIAGNOSIS — K21.9 GASTROESOPHAGEAL REFLUX DISEASE WITHOUT ESOPHAGITIS: ICD-10-CM

## 2024-10-06 DIAGNOSIS — K31.84 GASTROPARESIS: ICD-10-CM

## 2024-10-06 DIAGNOSIS — R10.10 PAIN OF UPPER ABDOMEN: ICD-10-CM

## 2024-10-07 RX ORDER — PANTOPRAZOLE SODIUM 40 MG/1
40 TABLET, DELAYED RELEASE ORAL 2 TIMES DAILY
Qty: 180 TABLET | Refills: 0 | Status: SHIPPED | OUTPATIENT
Start: 2024-10-07

## 2024-10-08 ENCOUNTER — TELEPHONE (OUTPATIENT)
Dept: GASTROENTEROLOGY | Facility: CLINIC | Age: 69
End: 2024-10-08
Payer: MEDICARE

## 2024-10-08 NOTE — TELEPHONE ENCOUNTER
----- Message from Gustavo Guerrero sent at 10/7/2024  2:47 PM EDT -----  10/07/24       Tell her that her upper GI with small bowel follow-through shows a small hiatal hernia, a diverticulum in the fundus of the stomach, and gastric fold thickening with a normal small bowel follow-through.  She last had an EGD in 6 of 2022 when you had some stomach erosions.  When I see her in the office next month we can discuss whether I should do a repeat EGD to further evaluate her nausea and to biopsy the thickened stomach folds?  Please send a copy of this report to her PCP.  Brie. kjh

## 2024-10-08 NOTE — TELEPHONE ENCOUNTER
Called pt and advised of Dr Blanca' note. Verb understanding.     Results sent to pcp thru [facility].

## 2024-11-13 ENCOUNTER — OFFICE VISIT (OUTPATIENT)
Dept: GASTROENTEROLOGY | Facility: CLINIC | Age: 69
End: 2024-11-13
Payer: MEDICARE

## 2024-11-13 ENCOUNTER — TELEPHONE (OUTPATIENT)
Dept: GASTROENTEROLOGY | Facility: CLINIC | Age: 69
End: 2024-11-13
Payer: MEDICARE

## 2024-11-13 VITALS
BODY MASS INDEX: 25.25 KG/M2 | TEMPERATURE: 99 F | WEIGHT: 137.2 LBS | SYSTOLIC BLOOD PRESSURE: 117 MMHG | HEIGHT: 62 IN | DIASTOLIC BLOOD PRESSURE: 77 MMHG

## 2024-11-13 DIAGNOSIS — K31.84 GASTROPARESIS: ICD-10-CM

## 2024-11-13 DIAGNOSIS — R93.5 ABNORMAL CT OF THE ABDOMEN: ICD-10-CM

## 2024-11-13 DIAGNOSIS — R11.0 NAUSEA: Primary | ICD-10-CM

## 2024-11-13 DIAGNOSIS — D12.6 ADENOMATOUS POLYP OF COLON, UNSPECIFIED PART OF COLON: ICD-10-CM

## 2024-11-13 PROCEDURE — 99214 OFFICE O/P EST MOD 30 MIN: CPT | Performed by: INTERNAL MEDICINE

## 2024-11-13 NOTE — PROGRESS NOTES
Chief Complaint   Patient presents with    Nausea       History of Present Illness:   69 y.o. female with a history of gastroesophageal reflux disease, gastroparesis (she has been seen by the  [facility]), and a history of colon polyps.  She last had an EGD and colonoscopy in 6 of 2022.  I recommended a repeat colonoscopy in 5 years.  I last saw her in 8/24. My assessment and plan then was:  Assessment:  Nausea  H/o gastroparesis. Reglan didn't work and she was scared of Domperidone        Recommendations:  Take Zofran 4 mg po q 8 hrs prn.   UGI with SBFT.  F/u 3 mos.        In 9/24 she had an UGI with sbft that showed:  IMPRESSION:  1. Mild intermittent tertiary wave formations observed in the lower half  of the esophagus.  2. Small sliding hiatal hernia with small to moderate volume of  recumbent gastroesophageal reflux observed refluxing to the upper  thoracic esophagus.  3. Diverticulum within the gastric fundus measures up to approximately 3  cm and previously measured 2.5 cm on prior UGI examination 10/18/2014.  4. Gastritis demonstrated by gastric fold thickening.  5. Normal small bowel follow-through with contrast seen reaching the  cecum at approximately 15 minutes.     This report was finalized on 9/19/2024 4:38 PM by Dr. Dillon Weeks M.D        She is to get labs Friday by Dr. Garth Ontiveros.        Still has nausea x many years. No abd or chest pain. She still works at [employer]. On protonix 40 mg/day, zofran prn and phenergan prn. Weight stable. +constipation. No diarrhea. Some rectal bleeding, no melena.     Past Medical History:   Diagnosis Date    Adenomatous polyp of colon 03/01/2021    Anxiety     Arthritis 04/2018    Bladder incontinence     Colon polyps 01/2015    HP & TA    Dizziness     Fatty liver 03/01/2021    GERD (gastroesophageal reflux disease)     Hypertension 7/2020    Mixed hyperlipidemia 09/05/2018    PAC (premature atrial contraction)     Rheumatoid arthritis      Tachycardia        Past Surgical History:   Procedure Laterality Date    APPENDECTOMY      BILATERAL OOPHORECTOMY      BLADDER SLING MODIFIED, ANTERIOR AND POSTERIOR VAGINAL REPAIR  2007,2011    BREAST BIOPSY      COLONOSCOPY  01/16/2015    TA w/low grade dysplasia HP x 3    COLONOSCOPY N/A 05/06/2016    IH, multiple hyperplastic polyps, otherwise normal exam    COLONOSCOPY N/A 06/10/2022    Procedure: COLONOSCOPY to cecum and TI with biopsies;  Surgeon: Gustavo Guerrero MD;  Location:  [facility];  Service: Gastroenterology;  Laterality: N/A;  Pre: H/x of polyps  Post: polyps, internal hemorrhoids    ENDOSCOPY  01/16/2015    erythematous mucosa in stomach    ENDOSCOPY N/A 06/10/2022    Procedure: ESOPHAGOGASTRODUODENOSCOPY with bxs;  Surgeon: Gustavo Guerrero MD;  Location:  [facility];  Service: Gastroenterology;  Laterality: N/A;  Pre: gastroparesis with nausea, GERD  Post: gastric errosion, gastric polyps, gastritis    HYSTERECTOMY      LAPAROSCOPIC CHOLECYSTECTOMY  2009    TUBAL ABDOMINAL LIGATION      UPPER GASTROINTESTINAL ENDOSCOPY  11/26/2012    erythematous mucosa in stomach    UPPER GASTROINTESTINAL ENDOSCOPY  06/11/2012    monoilial esophagitis, erythema         Current Outpatient Medications:     Adalimumab (Humira Pen) 40 MG/0.4ML Pen-injector Kit, Inject 40 mg under the skin into the appropriate area (abdomen or thighs) as directed Every 14 (Fourteen) Days. Rotate injection sites, Disp: 2 each, Rfl: 0    amLODIPine (NORVASC) 10 MG tablet, TAKE 1 TABLET BY MOUTH EVERY DAY, Disp: 90 tablet, Rfl: 1    cholecalciferol (VITAMIN D3) 25 MCG (1000 UT) tablet, Take 1 tablet by mouth Daily., Disp: , Rfl:     Diclofenac Sodium (VOLTAREN) 1 % gel gel, apply (4G)  by topical route 4 times every day to the affected area(s), Disp: 300 g, Rfl: 5    DM-APAP-CPM (Coricidin HBP) -2 MG tablet, Take  by mouth., Disp: , Rfl:     estradiol (CLIMARA) 0.1 MG/24HR patch, Apply 1 (one) Patch Weekly on skin once a  week, Disp: 12 patch, Rfl: 4    folic acid (FOLVITE) 1 MG tablet, TAKE 1 TABLET BY MOUTH ONCE DAILY, Disp: 90 tablet, Rfl: 3    methotrexate 2.5 MG tablet, TAKE 6 TABLETS BY MOUTH ONCE A WEEK (Patient taking differently: Take 6 tablets by mouth 1 (One) Time Per Week. Taking 4 tablets once a week), Disp: 72 tablet, Rfl: 1    methylcellulose, Laxative, (CITRUCEL) 500 MG tablet tablet, Take 2 tablets by mouth Daily., Disp: , Rfl:     methylPREDNISolone (MEDROL) 4 MG tablet, Take 0.5 tablets by mouth Daily. with food, Disp: , Rfl:     ondansetron (Zofran) 4 MG tablet, Take 1 tablet by mouth Every 8 (Eight) Hours As Needed for Nausea or Vomiting., Disp: 45 tablet, Rfl: 3    pantoprazole (PROTONIX) 40 MG EC tablet, TAKE 1 TABLET BY MOUTH 2 TIMES A DAY, Disp: 180 tablet, Rfl: 0    PARoxetine (PAXIL) 10 MG tablet, Take 0.5 tablets by mouth every night at bedtime., Disp: 45 tablet, Rfl: 1    promethazine (PHENERGAN) 25 MG tablet, Take 0.5 tablets by mouth Every 6 (Six) Hours As Needed for Nausea or Vomiting., Disp: 40 tablet, Rfl: 4    rosuvastatin (CRESTOR) 5 MG tablet, Take 1 tablet by mouth Daily., Disp: 90 tablet, Rfl: 1    Allergies   Allergen Reactions    Codeine GI Intolerance       Family History   Problem Relation Age of Onset    Diabetes Mother     Hypertension Mother     COPD Mother     Colon polyps Mother     COPD Father     Rheum arthritis Father     Colon polyps Father     No Known Problems Sister     No Known Problems Sister     No Known Problems Sister     No Known Problems Brother     Colon cancer Maternal Grandfather     Malig Hyperthermia Neg Hx        Social History     Socioeconomic History    Marital status:     Number of children: 2   Tobacco Use    Smoking status: Former     Current packs/day: 0.00     Average packs/day: 0.5 packs/day for 30.0 years (15.0 ttl pk-yrs)     Types: Cigarettes     Start date: 1/1/1979     Quit date: 1/1/2009     Years since quitting: 15.8     Passive exposure: Never     Smokeless tobacco: Never   Vaping Use    Vaping status: Never Used   Substance and Sexual Activity    Alcohol use: No    Drug use: No    Sexual activity: Yes     Partners: Male     Birth control/protection: Patch       Review of Systems   Gastrointestinal:  Positive for constipation.   All other systems reviewed and are negative.    Pertinent positives and negatives documented in the HPI and all other systems reviewed and were found to be negative.  Vitals:    11/13/24 0952   BP: 117/77   Temp: 99 °F (37.2 °C)       Physical Exam  Vitals reviewed.   Constitutional:       General: She is not in acute distress.     Appearance: Normal appearance. She is well-developed. She is not diaphoretic.   HENT:      Head: Normocephalic and atraumatic. Hair is normal.      Right Ear: Hearing, tympanic membrane, ear canal and external ear normal. No decreased hearing noted. No drainage.      Left Ear: Hearing, tympanic membrane, ear canal and external ear normal. No decreased hearing noted.      Nose: Nose normal. No nasal deformity.      Mouth/Throat:      Mouth: Mucous membranes are moist.   Eyes:      General: Lids are normal.         Right eye: No discharge.         Left eye: No discharge.      Extraocular Movements: Extraocular movements intact.      Conjunctiva/sclera: Conjunctivae normal.      Pupils: Pupils are equal, round, and reactive to light.   Neck:      Thyroid: No thyromegaly.      Vascular: No JVD.      Trachea: No tracheal deviation.   Cardiovascular:      Rate and Rhythm: Normal rate and regular rhythm.      Pulses: Normal pulses.      Heart sounds: Normal heart sounds. No murmur heard.     No friction rub. No gallop.   Pulmonary:      Effort: Pulmonary effort is normal. No respiratory distress.      Breath sounds: Normal breath sounds. No wheezing or rales.   Chest:      Chest wall: No tenderness.   Abdominal:      General: Bowel sounds are normal. There is no distension.      Palpations: Abdomen is soft.  There is no mass.      Tenderness: There is no abdominal tenderness. There is no guarding or rebound.      Hernia: No hernia is present.   Genitourinary:     Comments: She did not want a rectal exam.   Musculoskeletal:         General: No tenderness or deformity. Normal range of motion.      Cervical back: Normal range of motion and neck supple.   Lymphadenopathy:      Cervical: No cervical adenopathy.   Skin:     General: Skin is warm and dry.      Findings: No erythema or rash.   Neurological:      Mental Status: She is alert and oriented to person, place, and time.      Cranial Nerves: No cranial nerve deficit.      Motor: No abnormal muscle tone.      Coordination: Coordination normal.      Deep Tendon Reflexes: Reflexes are normal and symmetric. Reflexes normal.   Psychiatric:         Mood and Affect: Mood normal.         Behavior: Behavior normal.         Thought Content: Thought content normal.         Judgment: Judgment normal.         Diagnoses and all orders for this visit:    1. Nausea (Primary)  -     Case Request; Standing  -     Case Request    2. Gastroparesis  -     Case Request; Standing  -     Case Request    3. Adenomatous polyp of colon, unspecified part of colon    4. Abnormal CT of the abdomen  -     Case Request; Standing  -     Case Request    Other orders  -     Implement Anesthesia orders day of procedure.; Standing  -     Follow Anesthesia Guidelines / Protocol; Future      Assessment:  Nausea  H/o gastroparesis. Reglan didn't work and she was scared of Domperidone  Abnormal gastric fold thickening on UGI series.  history of colon polyps.  She last had an EGD and colonoscopy in 6 of 2022. I said repeat cs in 5 yrs.       Recommendations:  EGD      No follow-ups on file.    Gustavo Guerrero MD  11/13/2024

## 2024-11-13 NOTE — TELEPHONE ENCOUNTER
TYESHA DOWLING IN SCHEDULING PT SCHEDULED 11/15/2024 ARRIVING AT 0700AM EGD PREP INFORMATION HANDED TO PT OK FOR HUB TO RELAY

## 2024-11-15 ENCOUNTER — ANESTHESIA (OUTPATIENT)
Dept: GASTROENTEROLOGY | Facility: HOSPITAL | Age: 69
End: 2024-11-15
Payer: MEDICARE

## 2024-11-15 ENCOUNTER — ANESTHESIA EVENT (OUTPATIENT)
Dept: GASTROENTEROLOGY | Facility: HOSPITAL | Age: 69
End: 2024-11-15
Payer: MEDICARE

## 2024-11-15 ENCOUNTER — HOSPITAL ENCOUNTER (OUTPATIENT)
Facility: HOSPITAL | Age: 69
Setting detail: HOSPITAL OUTPATIENT SURGERY
Discharge: HOME | End: 2024-11-15
Attending: INTERNAL MEDICINE | Admitting: INTERNAL MEDICINE
Payer: MEDICARE

## 2024-11-15 VITALS
SYSTOLIC BLOOD PRESSURE: 116 MMHG | HEIGHT: 63 IN | BODY MASS INDEX: 24.31 KG/M2 | DIASTOLIC BLOOD PRESSURE: 63 MMHG | TEMPERATURE: 98.2 F | WEIGHT: 137.2 LBS | OXYGEN SATURATION: 96 % | HEART RATE: 70 BPM | RESPIRATION RATE: 16 BRPM

## 2024-11-15 DIAGNOSIS — R93.5 ABNORMAL CT OF THE ABDOMEN: ICD-10-CM

## 2024-11-15 DIAGNOSIS — K31.84 GASTROPARESIS: ICD-10-CM

## 2024-11-15 DIAGNOSIS — R11.0 NAUSEA: ICD-10-CM

## 2024-11-15 PROCEDURE — 25010000002 PROPOFOL 10 MG/ML EMULSION: Performed by: NURSE ANESTHETIST, CERTIFIED REGISTERED

## 2024-11-15 PROCEDURE — 43239 EGD BIOPSY SINGLE/MULTIPLE: CPT | Performed by: INTERNAL MEDICINE

## 2024-11-15 PROCEDURE — 88305 TISSUE EXAM BY PATHOLOGIST: CPT | Performed by: INTERNAL MEDICINE

## 2024-11-15 PROCEDURE — 25010000002 LIDOCAINE 2% SOLUTION: Performed by: NURSE ANESTHETIST, CERTIFIED REGISTERED

## 2024-11-15 RX ORDER — ONDANSETRON 2 MG/ML
4 INJECTION INTRAMUSCULAR; INTRAVENOUS ONCE AS NEEDED
Status: DISCONTINUED | OUTPATIENT
Start: 2024-11-15 | End: 2024-11-15 | Stop reason: HOSPADM

## 2024-11-15 RX ORDER — HYDRALAZINE HYDROCHLORIDE 20 MG/ML
10 INJECTION INTRAMUSCULAR; INTRAVENOUS
Status: DISCONTINUED | OUTPATIENT
Start: 2024-11-15 | End: 2024-11-15 | Stop reason: HOSPADM

## 2024-11-15 RX ORDER — NALOXONE HCL 0.4 MG/ML
0.2 VIAL (ML) INJECTION AS NEEDED
Status: DISCONTINUED | OUTPATIENT
Start: 2024-11-15 | End: 2024-11-15 | Stop reason: HOSPADM

## 2024-11-15 RX ORDER — LIDOCAINE HYDROCHLORIDE 10 MG/ML
0.5 INJECTION, SOLUTION INFILTRATION; PERINEURAL ONCE AS NEEDED
Status: DISCONTINUED | OUTPATIENT
Start: 2024-11-15 | End: 2024-11-15 | Stop reason: HOSPADM

## 2024-11-15 RX ORDER — FENTANYL CITRATE 50 UG/ML
50 INJECTION, SOLUTION INTRAMUSCULAR; INTRAVENOUS
Status: DISCONTINUED | OUTPATIENT
Start: 2024-11-15 | End: 2024-11-15 | Stop reason: HOSPADM

## 2024-11-15 RX ORDER — FENTANYL CITRATE 50 UG/ML
25 INJECTION, SOLUTION INTRAMUSCULAR; INTRAVENOUS
Status: DISCONTINUED | OUTPATIENT
Start: 2024-11-15 | End: 2024-11-15 | Stop reason: HOSPADM

## 2024-11-15 RX ORDER — DROPERIDOL 2.5 MG/ML
0.62 INJECTION, SOLUTION INTRAMUSCULAR; INTRAVENOUS ONCE AS NEEDED
Status: DISCONTINUED | OUTPATIENT
Start: 2024-11-15 | End: 2024-11-15 | Stop reason: HOSPADM

## 2024-11-15 RX ORDER — LABETALOL HYDROCHLORIDE 5 MG/ML
10 INJECTION, SOLUTION INTRAVENOUS
Status: DISCONTINUED | OUTPATIENT
Start: 2024-11-15 | End: 2024-11-15 | Stop reason: HOSPADM

## 2024-11-15 RX ORDER — PROPOFOL 10 MG/ML
VIAL (ML) INTRAVENOUS AS NEEDED
Status: DISCONTINUED | OUTPATIENT
Start: 2024-11-15 | End: 2024-11-15 | Stop reason: SURG

## 2024-11-15 RX ORDER — SODIUM CHLORIDE 0.9 % (FLUSH) 0.9 %
10 SYRINGE (ML) INJECTION AS NEEDED
Status: DISCONTINUED | OUTPATIENT
Start: 2024-11-15 | End: 2024-11-15 | Stop reason: HOSPADM

## 2024-11-15 RX ORDER — LIDOCAINE HYDROCHLORIDE 20 MG/ML
INJECTION, SOLUTION INFILTRATION; PERINEURAL AS NEEDED
Status: DISCONTINUED | OUTPATIENT
Start: 2024-11-15 | End: 2024-11-15 | Stop reason: SURG

## 2024-11-15 RX ORDER — EPHEDRINE SULFATE 50 MG/ML
10 INJECTION, SOLUTION INTRAVENOUS ONCE AS NEEDED
Status: DISCONTINUED | OUTPATIENT
Start: 2024-11-15 | End: 2024-11-15 | Stop reason: HOSPADM

## 2024-11-15 RX ORDER — SODIUM CHLORIDE, SODIUM LACTATE, POTASSIUM CHLORIDE, CALCIUM CHLORIDE 600; 310; 30; 20 MG/100ML; MG/100ML; MG/100ML; MG/100ML
20 INJECTION, SOLUTION INTRAVENOUS ONCE
Status: DISCONTINUED | OUTPATIENT
Start: 2024-11-15 | End: 2024-11-15 | Stop reason: HOSPADM

## 2024-11-15 RX ORDER — FLUMAZENIL 0.1 MG/ML
0.2 INJECTION INTRAVENOUS AS NEEDED
Status: DISCONTINUED | OUTPATIENT
Start: 2024-11-15 | End: 2024-11-15 | Stop reason: HOSPADM

## 2024-11-15 RX ORDER — DIPHENHYDRAMINE HYDROCHLORIDE 50 MG/ML
12.5 INJECTION INTRAMUSCULAR; INTRAVENOUS
Status: DISCONTINUED | OUTPATIENT
Start: 2024-11-15 | End: 2024-11-15 | Stop reason: HOSPADM

## 2024-11-15 RX ADMIN — PROPOFOL 70 MG: 10 INJECTION, EMULSION INTRAVENOUS at 08:38

## 2024-11-15 RX ADMIN — PROPOFOL 40 MG: 10 INJECTION, EMULSION INTRAVENOUS at 08:42

## 2024-11-15 RX ADMIN — LIDOCAINE HYDROCHLORIDE 100 MG: 20 INJECTION, SOLUTION INFILTRATION; PERINEURAL at 08:38

## 2024-11-15 NOTE — ANESTHESIA PREPROCEDURE EVALUATION
Anesthesia Evaluation     no history of anesthetic complications:   NPO Solid Status: > 8 hours  NPO Liquid Status: > 2 hours           Airway   Mallampati: II  Neck ROM: full  no difficulty expected  Dental - normal exam     Pulmonary - normal exam   (+) a smoker Former,  (-) COPD, asthma, sleep apnea    PE comment: nonlabored  Cardiovascular - normal exam  Exercise tolerance: good (4-7 METS)    Rhythm: regular  Rate: normal    (+) hypertension, dysrhythmias PAC, Tachycardia, hyperlipidemia  (-) valvular problems/murmurs, past MI, CAD, angina      Neuro/Psych  (+) dizziness/light headedness, psychiatric history Anxiety  (-) seizures, TIA, CVA  GI/Hepatic/Renal/Endo    (+) GERD, liver disease fatty liver disease  (-) no renal disease, diabetes, no thyroid disorder    Musculoskeletal     (+) arthralgias  Abdominal    Substance History      OB/GYN          Other   arthritis, autoimmune disease rheumatoid arthritis,                 Anesthesia Plan    ASA 2     MAC       Anesthetic plan, risks, benefits, and alternatives have been provided, discussed and informed consent has been obtained with: patient.    CODE STATUS:

## 2024-11-15 NOTE — DISCHARGE INSTRUCTIONS
For the next 24 hours patient needs to be with a responsible adult.    For 24 hours DO NOT drive, operate machinery, appliances, drink alcohol, make important decisions or sign legal documents.    Start with a light or bland diet if you are feeling sick to your stomach otherwise advance to regular diet as tolerated.    Follow recommendations on procedure report if provided by your doctor.    Call Dr Guerrero for problems [phone]    Problems may include but not limited to: large amounts of bleeding, trouble breathing, repeated vomiting, severe unrelieved pain, fever or chills.

## 2024-11-15 NOTE — H&P
Patient presents with    Nausea         History of Present Illness:   69 y.o. female with a history of gastroesophageal reflux disease, gastroparesis (she has been seen by the  [facility]), and a history of colon polyps.  She last had an EGD and colonoscopy in 6 of 2022.  I recommended a repeat colonoscopy in 5 years.  I last saw her in 8/24. My assessment and plan then was:  Assessment:  Nausea  H/o gastroparesis. Reglan didn't work and she was scared of Domperidone        Recommendations:  Take Zofran 4 mg po q 8 hrs prn.   UGI with SBFT.  F/u 3 mos.        In 9/24 she had an UGI with sbft that showed:  IMPRESSION:  1. Mild intermittent tertiary wave formations observed in the lower half  of the esophagus.  2. Small sliding hiatal hernia with small to moderate volume of  recumbent gastroesophageal reflux observed refluxing to the upper  thoracic esophagus.  3. Diverticulum within the gastric fundus measures up to approximately 3  cm and previously measured 2.5 cm on prior UGI examination 10/18/2014.  4. Gastritis demonstrated by gastric fold thickening.  5. Normal small bowel follow-through with contrast seen reaching the  cecum at approximately 15 minutes.     This report was finalized on 9/19/2024 4:38 PM by Dr. Dillon Weeks M.D        She is to get labs Friday by Dr. Garth Ontiveros.        Still has nausea x many years. No abd or chest pain. She still works at [employer]. On protonix 40 mg/day, zofran prn and phenergan prn. Weight stable. +constipation. No diarrhea. Some rectal bleeding, no melena.      Medical History        Past Medical History:   Diagnosis Date    Adenomatous polyp of colon 03/01/2021    Anxiety      Arthritis 04/2018    Bladder incontinence      Colon polyps 01/2015     HP & TA    Dizziness      Fatty liver 03/01/2021    GERD (gastroesophageal reflux disease)      Hypertension 7/2020    Mixed hyperlipidemia 09/05/2018    PAC (premature atrial contraction)      Rheumatoid  arthritis      Tachycardia              Surgical History         Past Surgical History:   Procedure Laterality Date    APPENDECTOMY        BILATERAL OOPHORECTOMY        BLADDER SLING MODIFIED, ANTERIOR AND POSTERIOR VAGINAL REPAIR   2007,2011    BREAST BIOPSY        COLONOSCOPY   01/16/2015     TA w/low grade dysplasia HP x 3    COLONOSCOPY N/A 05/06/2016     IH, multiple hyperplastic polyps, otherwise normal exam    COLONOSCOPY N/A 06/10/2022     Procedure: COLONOSCOPY to cecum and TI with biopsies;  Surgeon: Gustavo Guerrero MD;  Location:  [facility];  Service: Gastroenterology;  Laterality: N/A;  Pre: H/x of polyps  Post: polyps, internal hemorrhoids    ENDOSCOPY   01/16/2015     erythematous mucosa in stomach    ENDOSCOPY N/A 06/10/2022     Procedure: ESOPHAGOGASTRODUODENOSCOPY with bxs;  Surgeon: Gustavo Guerrero MD;  Location:  [facility];  Service: Gastroenterology;  Laterality: N/A;  Pre: gastroparesis with nausea, GERD  Post: gastric errosion, gastric polyps, gastritis    HYSTERECTOMY        LAPAROSCOPIC CHOLECYSTECTOMY   2009    TUBAL ABDOMINAL LIGATION        UPPER GASTROINTESTINAL ENDOSCOPY   11/26/2012     erythematous mucosa in stomach    UPPER GASTROINTESTINAL ENDOSCOPY   06/11/2012     monoilial esophagitis, erythema              Current Medications      Current Outpatient Medications:     Adalimumab (Humira Pen) 40 MG/0.4ML Pen-injector Kit, Inject 40 mg under the skin into the appropriate area (abdomen or thighs) as directed Every 14 (Fourteen) Days. Rotate injection sites, Disp: 2 each, Rfl: 0    amLODIPine (NORVASC) 10 MG tablet, TAKE 1 TABLET BY MOUTH EVERY DAY, Disp: 90 tablet, Rfl: 1    cholecalciferol (VITAMIN D3) 25 MCG (1000 UT) tablet, Take 1 tablet by mouth Daily., Disp: , Rfl:     Diclofenac Sodium (VOLTAREN) 1 % gel gel, apply (4G)  by topical route 4 times every day to the affected area(s), Disp: 300 g, Rfl: 5    DM-APAP-CPM (Coricidin HBP) -2 MG tablet, Take  by mouth.,  Disp: , Rfl:     estradiol (CLIMARA) 0.1 MG/24HR patch, Apply 1 (one) Patch Weekly on skin once a week, Disp: 12 patch, Rfl: 4    folic acid (FOLVITE) 1 MG tablet, TAKE 1 TABLET BY MOUTH ONCE DAILY, Disp: 90 tablet, Rfl: 3    methotrexate 2.5 MG tablet, TAKE 6 TABLETS BY MOUTH ONCE A WEEK (Patient taking differently: Take 6 tablets by mouth 1 (One) Time Per Week. Taking 4 tablets once a week), Disp: 72 tablet, Rfl: 1    methylcellulose, Laxative, (CITRUCEL) 500 MG tablet tablet, Take 2 tablets by mouth Daily., Disp: , Rfl:     methylPREDNISolone (MEDROL) 4 MG tablet, Take 0.5 tablets by mouth Daily. with food, Disp: , Rfl:     ondansetron (Zofran) 4 MG tablet, Take 1 tablet by mouth Every 8 (Eight) Hours As Needed for Nausea or Vomiting., Disp: 45 tablet, Rfl: 3    pantoprazole (PROTONIX) 40 MG EC tablet, TAKE 1 TABLET BY MOUTH 2 TIMES A DAY, Disp: 180 tablet, Rfl: 0    PARoxetine (PAXIL) 10 MG tablet, Take 0.5 tablets by mouth every night at bedtime., Disp: 45 tablet, Rfl: 1    promethazine (PHENERGAN) 25 MG tablet, Take 0.5 tablets by mouth Every 6 (Six) Hours As Needed for Nausea or Vomiting., Disp: 40 tablet, Rfl: 4    rosuvastatin (CRESTOR) 5 MG tablet, Take 1 tablet by mouth Daily., Disp: 90 tablet, Rfl: 1        Allergies        Allergies   Allergen Reactions    Codeine GI Intolerance                  Family History   Problem Relation Age of Onset    Diabetes Mother      Hypertension Mother      COPD Mother      Colon polyps Mother      COPD Father      Rheum arthritis Father      Colon polyps Father      No Known Problems Sister      No Known Problems Sister      No Known Problems Sister      No Known Problems Brother      Colon cancer Maternal Grandfather      Malig Hyperthermia Neg Hx           Social History   Social History            Socioeconomic History    Marital status:     Number of children: 2   Tobacco Use    Smoking status: Former       Current packs/day: 0.00       Average packs/day: 0.5  packs/day for 30.0 years (15.0 ttl pk-yrs)       Types: Cigarettes       Start date: 1/1/1979       Quit date: 1/1/2009       Years since quitting: 15.8       Passive exposure: Never    Smokeless tobacco: Never   Vaping Use    Vaping status: Never Used   Substance and Sexual Activity    Alcohol use: No    Drug use: No    Sexual activity: Yes       Partners: Male       Birth control/protection: Patch            Review of Systems   Gastrointestinal:  Positive for constipation.   All other systems reviewed and are negative.     Pertinent positives and negatives documented in the HPI and all other systems reviewed and were found to be negative.      Vitals:     11/13/24 0952   BP: 117/77   Temp: 99 °F (37.2 °C)         Physical Exam  Vitals reviewed.   Constitutional:       General: She is not in acute distress.     Appearance: Normal appearance. She is well-developed. She is not diaphoretic.   HENT:      Head: Normocephalic and atraumatic. Hair is normal.      Right Ear: Hearing, tympanic membrane, ear canal and external ear normal. No decreased hearing noted. No drainage.      Left Ear: Hearing, tympanic membrane, ear canal and external ear normal. No decreased hearing noted.      Nose: Nose normal. No nasal deformity.      Mouth/Throat:      Mouth: Mucous membranes are moist.   Eyes:      General: Lids are normal.         Right eye: No discharge.         Left eye: No discharge.      Extraocular Movements: Extraocular movements intact.      Conjunctiva/sclera: Conjunctivae normal.      Pupils: Pupils are equal, round, and reactive to light.   Neck:      Thyroid: No thyromegaly.      Vascular: No JVD.      Trachea: No tracheal deviation.   Cardiovascular:      Rate and Rhythm: Normal rate and regular rhythm.      Pulses: Normal pulses.      Heart sounds: Normal heart sounds. No murmur heard.     No friction rub. No gallop.   Pulmonary:      Effort: Pulmonary effort is normal. No respiratory distress.      Breath  sounds: Normal breath sounds. No wheezing or rales.   Chest:      Chest wall: No tenderness.   Abdominal:      General: Bowel sounds are normal. There is no distension.      Palpations: Abdomen is soft. There is no mass.      Tenderness: There is no abdominal tenderness. There is no guarding or rebound.      Hernia: No hernia is present.   Genitourinary:     Comments: She did not want a rectal exam.   Musculoskeletal:         General: No tenderness or deformity. Normal range of motion.      Cervical back: Normal range of motion and neck supple.   Lymphadenopathy:      Cervical: No cervical adenopathy.   Skin:     General: Skin is warm and dry.      Findings: No erythema or rash.   Neurological:      Mental Status: She is alert and oriented to person, place, and time.      Cranial Nerves: No cranial nerve deficit.      Motor: No abnormal muscle tone.      Coordination: Coordination normal.      Deep Tendon Reflexes: Reflexes are normal and symmetric. Reflexes normal.   Psychiatric:         Mood and Affect: Mood normal.         Behavior: Behavior normal.         Thought Content: Thought content normal.         Judgment: Judgment normal.            Diagnoses and all orders for this visit:     1. Nausea (Primary)  -     Case Request; Standing  -     Case Request     2. Gastroparesis  -     Case Request; Standing  -     Case Request     3. Adenomatous polyp of colon, unspecified part of colon     4. Abnormal CT of the abdomen  -     Case Request; Standing  -     Case Request     Other orders  -     Implement Anesthesia orders day of procedure.; Standing  -     Follow Anesthesia Guidelines / Protocol; Future        Assessment:  Nausea  H/o gastroparesis. Reglan didn't work and she was scared of Domperidone  Abnormal gastric fold thickening on UGI series.  history of colon polyps.  She last had an EGD and colonoscopy in 6 of 2022. I said repeat cs in 5 yrs.         Recommendations:  EGD        No follow-ups on file.      11/15/24 - No change from the above H and P.    Gustavo Guerrero MD

## 2024-11-15 NOTE — ANESTHESIA POSTPROCEDURE EVALUATION
"Patient: Ritu Hanson    Procedure Summary       Date: 11/15/24 Room / Location: [facility] END[facility]COPY 5 / [facility]    Anesthesia Start: 0833 Anesthesia Stop: 0848    Procedure: ESOPHAGOGASTRODUODENOSCOPY WITH BIOPSY (Esophagus) Diagnosis:       Nausea      Gastroparesis      Abnormal CT of the abdomen      (Nausea [R11.0])      (Gastroparesis [K31.84])      (Abnormal CT of the abdomen [R93.5])    Surgeons: Gustavo Guerrero MD Provider: Dylan Castro MD    Anesthesia Type: MAC ASA Status: 2            Anesthesia Type: MAC    Vitals  Vitals Value Taken Time   /58 11/15/24 0858   Temp     Pulse 74 11/15/24 0902   Resp 16 11/15/24 0857   SpO2 97 % 11/15/24 0902   Vitals shown include unfiled device data.        Post Anesthesia Care and Evaluation    Patient location during evaluation: bedside  Patient participation: complete - patient participated  Level of consciousness: awake  Pain management: adequate    Airway patency: patent  Anesthetic complications: No anesthetic complications    Cardiovascular status: acceptable  Respiratory status: acceptable  Hydration status: acceptable    Comments: */58   Pulse 73   Temp 36.8 °C (98.2 °F) (Oral)   Resp 16   Ht 158.8 cm (62.5\")   Wt 62.2 kg (137 lb 3.2 oz)   LMP  (LMP Unknown)   SpO2 97%   BMI 24.69 kg/m²       "

## 2024-11-18 LAB
CYTO UR: NORMAL
LAB AP CASE REPORT: NORMAL
PATH REPORT.FINAL DX SPEC: NORMAL
PATH REPORT.GROSS SPEC: NORMAL

## 2024-12-05 ENCOUNTER — TELEPHONE (OUTPATIENT)
Dept: GASTROENTEROLOGY | Facility: CLINIC | Age: 69
End: 2024-12-05
Payer: MEDICARE

## 2024-12-05 NOTE — TELEPHONE ENCOUNTER
----- Message from Gustavo Guerrero sent at 11/29/2024  6:11 PM EST -----  11/29/24       Tell her that path from her EGD showed that the stomach polyps were not cancerous and not precancerous, which is good.   Thx. kjh

## 2024-12-05 NOTE — TELEPHONE ENCOUNTER
Hub staff attempted to follow warm transfer process and was unsuccessful     Caller: Ritu Hanson    Relationship to patient: Self    Best call back number: [phone]    Patient is needing: PT RETURNING MISSED CALL TO GUILLE KENT TO [facility]. PLEASE CALL BACK AFTER 4PM WHEN PT GETS OFF WORK.

## 2025-01-19 DIAGNOSIS — I10 ESSENTIAL HYPERTENSION: ICD-10-CM

## 2025-01-20 RX ORDER — AMLODIPINE BESYLATE 10 MG/1
10 TABLET ORAL DAILY
Qty: 90 TABLET | Refills: 0 | Status: SHIPPED | OUTPATIENT
Start: 2025-01-20

## 2025-01-20 RX ORDER — ROSUVASTATIN CALCIUM 5 MG/1
5 TABLET, COATED ORAL DAILY
Qty: 90 TABLET | Refills: 0 | Status: SHIPPED | OUTPATIENT
Start: 2025-01-20

## 2025-01-20 NOTE — TELEPHONE ENCOUNTER
Name: [id number] - [address] - [phone]  - [phone] FX      Relationship: Pharmacy      Best Callback Number: [phone]      HUB PROVIDED THE RELAY MESSAGE FROM THE OFFICE      PATIENT: SCHEDULED PER NOTE    ADDITIONAL INFORMATION:

## 2025-01-20 NOTE — TELEPHONE ENCOUNTER
Rx Refill Note  Requested Prescriptions     Pending Prescriptions Disp Refills    rosuvastatin (CRESTOR) 5 MG tablet [Pharmacy Med Name: Rosuvastatin Calcium Oral Tablet 5 MG] 90 tablet 1     Sig: TAKE 1 TABLET BY MOUTH EVERY DAY    amLODIPine (NORVASC) 10 MG tablet [Pharmacy Med Name: amLODIPine Besylate Oral Tablet 10 MG] 90 tablet 1     Sig: TAKE 1 TABLET BY MOUTH EVERY DAY      Last office visit with prescribing clinician: 5/20/2024   Last telemedicine visit with prescribing clinician: Visit date not found   Next office visit with prescribing clinician: 1/23/2025                         Would you like a call back once the refill request has been completed: [] Yes [] No    If the office needs to give you a call back, can they leave a voicemail: [] Yes [] No    Zina Barnett Rep  01/20/25, 14:43 EST

## 2025-01-23 ENCOUNTER — OFFICE VISIT (OUTPATIENT)
Dept: FAMILY MEDICINE CLINIC | Facility: CLINIC | Age: 70
End: 2025-01-23
Payer: MEDICARE

## 2025-01-23 VITALS
BODY MASS INDEX: 24.63 KG/M2 | HEIGHT: 63 IN | WEIGHT: 139 LBS | HEART RATE: 67 BPM | TEMPERATURE: 97.5 F | OXYGEN SATURATION: 98 % | SYSTOLIC BLOOD PRESSURE: 116 MMHG | DIASTOLIC BLOOD PRESSURE: 78 MMHG

## 2025-01-23 DIAGNOSIS — I10 ESSENTIAL HYPERTENSION: Primary | Chronic | ICD-10-CM

## 2025-01-23 DIAGNOSIS — E78.2 MIXED HYPERLIPIDEMIA: Chronic | ICD-10-CM
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
## 2025-01-23 DIAGNOSIS — M76.899 BICEPS FEMORIS TENDINITIS: ICD-10-CM

## 2025-01-23 DIAGNOSIS — F41.1 GAD (GENERALIZED ANXIETY DISORDER): Chronic | ICD-10-CM

## 2025-01-23 NOTE — PROGRESS NOTES
"Chief Complaint  Hyperlipidemia and Shoulder Pain (Right. Started a month ago)    Subjective        Ritu Hanson presents to [facility]  History of Present Illness    History of Present Illness  The patient presents for a 6-month follow-up.    She reports an incident of tripping over her house shoe in the kitchen approximately 2 months ago, resulting in a fall on her left side. This incident led to skin abrasions on her left side and knee, which have since healed. Additionally, she mentions a work-related injury where a box fell on her body 2 weeks ago. She has been managing the resulting wound with Neosporin and peroxide, and it is currently in the process of healing. She has been applying ice and heat to the area and keeping it wrapped with an Ace bandage.    She also reports experiencing pain in her arm, which she rates as a 6 or 7 on the pain scale. This pain, which has been present for about a month, is not associated with any specific activity or injury. She does not experience any elbow pain and reports that the pain has remained consistent. 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    She is currently on Crestor and Paxil, both of which she tolerates well. She received her blood pressure medication yesterday. She is up to date on her mammogram and vaccinations. She reports no shortness of breath, cough, chest pain, heart palpitations, new abdominal issues, hematuria, or hematochezia. She maintains adequate hydration and reports no ear or throat problems.    PHQ-9 Total Score: 00  GIACOMO-7 Score: GIACOMO 7 Total Score: 00    MEDICATIONS  Current    IMMUNIZATIONS  She is up to date on all her vaccines.       Objective   Vital Signs:  /78 (BP Location: Left arm, Patient Position: Sitting, Cuff Size: Adult)   Pulse 67   Temp 97.5 °F (36.4 °C) (Temporal)   Ht 158.8 cm (62.5\")   Wt 63 kg (139 lb)   SpO2 98%   " "BMI 25.02 kg/m²   Estimated body mass index is 25.02 kg/m² as calculated from the following:    Height as of this encounter: 158.8 cm (62.5\").    Weight as of this encounter: 63 kg (139 lb).       BMI is >= 25 and <30. (Overweight) The following options were offered after discussion;: Information on healthy weight added to patient's after visit summary.      Physical Exam  Vitals and nursing note reviewed.   Constitutional:       General: She is not in acute distress.     Appearance: She is well-developed. She is not ill-appearing or diaphoretic.   HENT:      Head: Normocephalic and atraumatic.   Eyes:      General:         Right eye: No discharge.         Left eye: No discharge.      Conjunctiva/sclera: Conjunctivae normal.   Cardiovascular:      Rate and Rhythm: Normal rate and regular rhythm.   Pulmonary:      Effort: Pulmonary effort is normal.      Breath sounds: Normal breath sounds.   Abdominal:      General: Bowel sounds are normal.      Palpations: Abdomen is soft.      Tenderness: There is no abdominal tenderness.   Musculoskeletal:         General: No deformity.      Comments: Gait smooth and steady  Small abrasion left knee   Skin:     General: Skin is warm and dry.   Neurological:      Mental Status: She is alert and oriented to person, place, and time.   Psychiatric:         Mood and Affect: Mood normal.         Behavior: Behavior normal.          Physical Exam      Vital Signs  Blood pressure is normal.     Result Review :            Results  Laboratory Studies  Cholesterol levels were good. Glucose levels were good. Kidney function was excellent. Sodium, potassium, chloride, calcium levels were good. Albumin and liver function were good. Platelet count was normal.                Assessment and Plan     Diagnoses and all orders for this visit:    1. Essential hypertension (Primary)    2. Mixed hyperlipidemia    3. GIACOMO (generalized anxiety disorder)    4. Biceps femoris tendinitis  -     Ambulatory " Referral to Sports Medicine    5. Fall, sequela        Assessment & Plan   Right bicep pain.  The etiology of the pain could be attributed to a potential tear or strain in the bicep, possibly indicative of tendinitis due to repetitive motion at work. A referral to sports medicine will be initiated for further evaluation. She is advised to abstain from lifting heavy objects until her appointment with sports medicine.     Health maintenance.  Her blood pressure readings have remained stable.  We will continue current medication.  No refill needed today.      Hyperlipidemia is well-managed with current Crestor which we will continue.    PHQ and GIACOMO were scores of 0.  We will continue current Paxil.  No refill needed today.    Fall precautions discussed      She is current with her mammogram screenings and vaccinations. Her last laboratory work was conducted on 11/21/2024, and the results were within normal limits.             Follow Up     No follow-ups on file.  Patient was given instructions and counseling regarding her condition or for health maintenance advice. Please see specific information pulled into the AVS if appropriate.    Patient or patient representative verbalized consent for the use of Ambient Listening during the visit with  JUSTIN Smith for chart documentation. 2/6/2025  08:11 MARKO López-GYN

## 2025-02-05 ENCOUNTER — OFFICE VISIT (OUTPATIENT)
Dept: SPORTS MEDICINE | Facility: CLINIC | Age: 70
End: 2025-02-05
Payer: MEDICARE

## 2025-02-05 VITALS
WEIGHT: 139 LBS | RESPIRATION RATE: 16 BRPM | HEART RATE: 87 BPM | HEIGHT: 63 IN | BODY MASS INDEX: 24.63 KG/M2 | SYSTOLIC BLOOD PRESSURE: 118 MMHG | OXYGEN SATURATION: 99 % | DIASTOLIC BLOOD PRESSURE: 70 MMHG

## 2025-02-05 DIAGNOSIS — M05.79 RHEUMATOID ARTHRITIS INVOLVING MULTIPLE SITES WITH POSITIVE RHEUMATOID FACTOR: ICD-10-CM

## 2025-02-05 DIAGNOSIS — M75.21 BICEPS TENDINITIS OF RIGHT UPPER EXTREMITY: ICD-10-CM

## 2025-02-05 DIAGNOSIS — M79.601 RIGHT ARM PAIN: Primary | ICD-10-CM

## 2025-02-05 PROCEDURE — 99214 OFFICE O/P EST MOD 30 MIN: CPT | Performed by: FAMILY MEDICINE

## 2025-02-05 PROCEDURE — 1159F MED LIST DOCD IN RCRD: CPT | Performed by: FAMILY MEDICINE

## 2025-02-05 PROCEDURE — 1160F RVW MEDS BY RX/DR IN RCRD: CPT | Performed by: FAMILY MEDICINE

## 2025-02-05 NOTE — PROGRESS NOTES
Ritu is a 69 y.o. year old female presents to [facility]    Chief Complaint   Patient presents with    Right Shoulder - Pain, Initial Evaluation     PCP referral for eval of RT biceps pain - reports pain shooting at time, NKI, pain present for about 1 month, not sure if she lifted something wrong at work or not - Ice and OTC meds with no relief - here for further evaluation and treatment      New patient to practice, seen at the request of Kiara Villanueva.    History of Present Illness  History of Present Illness  The patient presents for evaluation of right arm and shoulder pain.    She reports persistent pain in her right arm and shoulder, which has not shown any signs of improvement since its onset approximately 1 month ago. The pain is severe enough to disrupt her sleep, having done so on 2 occasions. She also experiences discomfort when attempting to lift objects. She has been managing the pain with ice application since its onset. She has been using her left arm more frequently to avoid exacerbating the pain in her right arm. She has a history of rheumatoid arthritis, which she believes may be contributing to her current symptoms. 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 She is usually the main  and uses a knife to open boxes. She retired after 25 years at [employer] and returned to work after a year due to boredom.    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 She is usually the main  and uses a knife to open boxes. She retired after 25 years at [employer] and returned to work after a year due to boredom.    I have reviewed the patient's medical, family, and social history in detail and updated the computerized patient record.    /70 (BP Location: Left arm, Patient Position: Sitting, Cuff Size: Adult)   Pulse 87    "Resp 16   Ht 158.8 cm (62.52\")   Wt 63 kg (139 lb)   LMP  (LMP Unknown)   SpO2 99%   BMI 25.00 kg/m²      Physical Exam    Vital signs reviewed.   General: No acute distress.  Eyes: conjunctiva clear; pupils equally round and reactive  ENT: external ears atraumatic  CV: no peripheral edema  Resp: normal respiratory effort, no use of accessory muscles  Skin: no rashes or wounds; normal turgor  Psych: mood and affect appropriate; recent and remote memory intact  Neuro: sensation to light touch intact    MSK Exam  Physical Exam  The right shoulder has a full range of motion. Tests for drop arm, empty can, Neer's sign, and Sheehan are all negative. Speed's sign is positive, with pain elicited at the distal bicep where it inserts into the elbow. The right elbow also has a full range of motion, with minimal tenderness along the lateral epicondyle and point tenderness at the distal biceps tendon at its insertion. Pain is present with resisted elbow flexion.    Right Shoulder X-Ray  Indication: Pain  Views: AP Internal and External Rotation    Findings:  No fracture  No bony lesion  Normal soft tissues  Normal joint spaces    No prior studies were available for comparison.        Results  Imaging  X-ray of the shoulder shows no abnormalities.         Diagnoses and all orders for this visit:    Right arm pain  -     XR Shoulder 2+ View Right  -     Ibuprofen 3 %, Baclofen 2 %, lidocaine 4 %; Apply 1-2 g topically to the appropriate area as directed 3 (Three) to 4 (Four) times daily.  -     Ambulatory Referral to Physical Therapy for Evaluation & Treatment    Biceps tendinitis of right upper extremity  -     Ibuprofen 3 %, Baclofen 2 %, lidocaine 4 %; Apply 1-2 g topically to the appropriate area as directed 3 (Three) to 4 (Four) times daily.  -     Ambulatory Referral to Physical Therapy for Evaluation & Treatment    Rheumatoid arthritis involving multiple sites with positive rheumatoid factor      Assessment & Plan  1. " Insertional tendinitis.  The condition is likely due to a forceful contraction of the bicep. There is no evidence of rupture or tear. Symptoms suggest biceps tendinitis, specifically at the insertion point of the biceps tendon in the elbow. The x-ray of the shoulder appears normal, indicating that the issue is localized to the elbow. A referral for physical therapy will be made, with sessions scheduled 1 to 2 times per week. A prescription for a topical compounded medication will be sent to [facility], to be applied up to 4 times daily. An elbow sleeve is recommended for use during work hours to help alleviate pain.    Follow-up  The patient will follow up in 8 weeks.          Follow Up     Patient was given instructions and counseling regarding her condition or for health maintenance advice. Please see specific information pulled into the AVS if appropriate.     Patient or patient representative verbalized consent for the use of Ambient Listening during the visit with  DARRYN Harmon Jr., DO for chart documentation. 2/5/2025  09:33 EST

## 2025-04-09 DIAGNOSIS — K21.9 GASTROESOPHAGEAL REFLUX DISEASE WITHOUT ESOPHAGITIS: ICD-10-CM

## 2025-04-09 DIAGNOSIS — K31.84 GASTROPARESIS: ICD-10-CM

## 2025-04-09 DIAGNOSIS — R10.10 PAIN OF UPPER ABDOMEN: ICD-10-CM

## 2025-04-09 DIAGNOSIS — R11.0 NAUSEA: ICD-10-CM

## 2025-04-09 RX ORDER — PANTOPRAZOLE SODIUM 40 MG/1
40 TABLET, DELAYED RELEASE ORAL 2 TIMES DAILY
Qty: 180 TABLET | Refills: 2 | Status: SHIPPED | OUTPATIENT
Start: 2025-04-09

## 2025-04-09 NOTE — TELEPHONE ENCOUNTER
Caller: HansonRitu    Relationship: Self    Best call back number: [phone]    Requested Prescriptions:   Requested Prescriptions     Pending Prescriptions Disp Refills    pantoprazole (PROTONIX) 40 MG EC tablet 180 tablet 0     Sig: Take 1 tablet by mouth 2 (Two) Times a Day.        Pharmacy where request should be sent: [id number] - [address] - [phone]  - [phone] FX     Last office visit with prescribing clinician: 11/13/2024   Last telemedicine visit with prescribing clinician: Visit date not found   Next office visit with prescribing clinician: Visit date not found     Additional details provided by patient:     Does the patient have less than a 3 day supply:  [x] Yes  [] No    Would you like a call back once the refill request has been completed: [x] Yes [] No    If the office needs to give you a call back, can they leave a voicemail: [x] Yes [] No    Zina Quiñones Rep   04/09/25 10:21 EDT

## 2025-04-13 DIAGNOSIS — I10 ESSENTIAL HYPERTENSION: ICD-10-CM

## 2025-04-14 RX ORDER — AMLODIPINE BESYLATE 10 MG/1
10 TABLET ORAL DAILY
Qty: 90 TABLET | Refills: 1 | Status: SHIPPED | OUTPATIENT
Start: 2025-04-14

## 2025-04-14 NOTE — TELEPHONE ENCOUNTER
Rx Refill Note  Requested Prescriptions     Pending Prescriptions Disp Refills    amLODIPine (NORVASC) 10 MG tablet [Pharmacy Med Name: amLODIPine Besylate Oral Tablet 10 MG] 90 tablet 1     Sig: TAKE 1 TABLET BY MOUTH EVERY DAY      Last office visit with prescribing clinician: 1/23/2025   Last telemedicine visit with prescribing clinician: Visit date not found   Next office visit with prescribing clinician: Visit date not found                         Would you like a call back once the refill request has been completed: [] Yes [] No    If the office needs to give you a call back, can they leave a voicemail: [] Yes [] No    Zina Barnett Rep  04/14/25, 08:28 EDT

## 2025-04-14 NOTE — TELEPHONE ENCOUNTER
Please contact patient, medication was refilled, but needs an appointment for 6-month follow-up in June-SW

## 2025-05-14 RX ORDER — ROSUVASTATIN CALCIUM 5 MG/1
5 TABLET, COATED ORAL DAILY
Qty: 90 TABLET | Refills: 0 | Status: SHIPPED | OUTPATIENT
Start: 2025-05-14

## 2025-05-14 NOTE — TELEPHONE ENCOUNTER
Rx Refill Note  Requested Prescriptions     Pending Prescriptions Disp Refills    rosuvastatin (CRESTOR) 5 MG tablet [Pharmacy Med Name: Rosuvastatin Calcium Oral Tablet 5 MG] 90 tablet 1     Sig: TAKE 1 TABLET BY MOUTH EVERY DAY      Last office visit with prescribing clinician: 1/23/2025   Last telemedicine visit with prescribing clinician: Visit date not found   Next office visit with prescribing clinician: 6/4/2025                         Would you like a call back once the refill request has been completed: [] Yes [] No    If the office needs to give you a call back, can they leave a voicemail: [] Yes [] No    Zina Barnett Rep  05/14/25, 10:40 EDT

## 2025-06-02 ENCOUNTER — TELEPHONE (OUTPATIENT)
Dept: FAMILY MEDICINE CLINIC | Facility: CLINIC | Age: 70
End: 2025-06-02
Payer: MEDICARE

## 2025-06-02 NOTE — TELEPHONE ENCOUNTER
Called LVM to see if we can reschedule appointment on 6/4 on a different day so we can do pt med refill and AWV

## 2025-06-03 ENCOUNTER — TELEPHONE (OUTPATIENT)
Dept: FAMILY MEDICINE CLINIC | Facility: CLINIC | Age: 70
End: 2025-06-03

## 2025-06-03 NOTE — TELEPHONE ENCOUNTER
Hub staff attempted to follow warm transfer process and was unsuccessful     Caller: Ritu Hanson    Relationship to patient: Self    Best call back number: [phone]    Patient is needing: PATIENT IS RETURNING CALL TO RESCHEDULE APPT 6/4 AND CHANGE IT TO A WELLNESS VISIT. PATIENT IS WANTING AN EARLY MORNING ON A WEDNESDAY AND HUB CAN NOT ACCOMODATE. PLEASE CALL.

## 2025-06-03 NOTE — TELEPHONE ENCOUNTER
Hub staff attempted to follow warm transfer process and was unsuccessful     Caller: Ritu Hanson    Relationship to patient: Self    Best call back number: [phone]     Patient is needing:PATIENT IS RETURNING A CALL

## 2025-07-16 ENCOUNTER — OFFICE VISIT (OUTPATIENT)
Dept: FAMILY MEDICINE CLINIC | Facility: CLINIC | Age: 70
End: 2025-07-16
Payer: MEDICARE

## 2025-07-16 VITALS
HEIGHT: 63 IN | OXYGEN SATURATION: 98 % | DIASTOLIC BLOOD PRESSURE: 60 MMHG | RESPIRATION RATE: 18 BRPM | SYSTOLIC BLOOD PRESSURE: 108 MMHG | HEART RATE: 81 BPM | TEMPERATURE: 97.5 F | WEIGHT: 138.2 LBS | BODY MASS INDEX: 24.49 KG/M2

## 2025-07-16 DIAGNOSIS — F41.1 GAD (GENERALIZED ANXIETY DISORDER): ICD-10-CM

## 2025-07-16 DIAGNOSIS — H61.23 BILATERAL IMPACTED CERUMEN: ICD-10-CM

## 2025-07-16 DIAGNOSIS — Z00.00 ENCOUNTER FOR ANNUAL WELLNESS VISIT (AWV) IN MEDICARE PATIENT: Primary | ICD-10-CM

## 2025-07-16 DIAGNOSIS — Z79.52 CURRENT CHRONIC USE OF SYSTEMIC STEROIDS: ICD-10-CM

## 2025-07-16 DIAGNOSIS — I10 ESSENTIAL HYPERTENSION: ICD-10-CM

## 2025-07-16 DIAGNOSIS — E78.2 MIXED HYPERLIPIDEMIA: ICD-10-CM

## 2025-07-16 DIAGNOSIS — M05.79 RHEUMATOID ARTHRITIS INVOLVING MULTIPLE SITES WITH POSITIVE RHEUMATOID FACTOR: ICD-10-CM

## 2025-07-16 RX ORDER — DIPHENOXYLATE HYDROCHLORIDE AND ATROPINE SULFATE 2.5; .025 MG/1; MG/1
1 TABLET ORAL DAILY
COMMUNITY

## 2025-07-16 RX ORDER — PREDNISONE 1 MG/1
1 TABLET ORAL DAILY
Qty: 30 TABLET | Refills: 0 | Status: SHIPPED | OUTPATIENT
Start: 2025-07-16

## 2025-07-16 NOTE — PROGRESS NOTES
The ABCs of the Annual Wellness Visit  Subsequent Medicare Wellness Visit    Subjective    Ritu Hanson is a 70 y.o. female who presents for a Subsequent Medicare Wellness Visit.    The following portions of the patient's history were reviewed and   updated as appropriate: allergies, current medications, past family history, past medical history, past social history, past surgical history, and problem list.    Compared to one year ago, the patient feels her physical   health is the same.    Compared to one year ago, the patient feels her mental   health is the same.    Recent Hospitalizations:  This patient has had a [facility] admission record on file within the last 365 days.    Current Medical Providers:  Patient Care Team:  Kiara Villanueva APRN as PCP - General (Nurse Practitioner)  Garth Ontiveros MD as Consulting Physician (Rheumatology)  Korey Kenny MD as Consulting Physician (Orthopedic Surgery)  Isabel Pinedo APRN as Nurse Practitioner (Obstetrics and Gynecology)    Outpatient Medications Prior to Visit   Medication Sig Dispense Refill    Adalimumab (Humira Pen) 40 MG/0.4ML Pen-injector Kit Inject 40 mg under the skin into the appropriate area (abdomen or thighs) as directed Every 14 (Fourteen) Days. Rotate injection sites 2 each 0    cholecalciferol (VITAMIN D3) 25 MCG (1000 UT) tablet Take 1 tablet by mouth Daily.      diclofenac (VOLTAREN) 50 MG EC tablet Take 1 tablet by mouth 3 (Three) Times a Day. Take with food and stop if upset stomach 21 tablet 0    estradiol (CLIMARA) 0.1 MG/24HR patch Apply 1 (one) Patch Weekly on skin once a week 12 patch 4    folic acid (FOLVITE) 1 MG tablet TAKE 1 TABLET BY MOUTH ONCE DAILY 90 tablet 3    Ibuprofen 3 %, Baclofen 2 %, lidocaine 4 % Apply 1-2 g topically to the appropriate area as directed 3 (Three) to 4 (Four) times daily. 240 g 3    lidocaine (LIDODERM) 5 % Place 1 patch on the skin as directed by provider Daily. Remove & Discard patch  within 12 hours or as directed by MD 14 patch 0    methocarbamol (ROBAXIN) 750 MG tablet Take 1 tablet by mouth 3 (Three) Times a Day. 21 tablet 0    methotrexate 2.5 MG tablet TAKE 6 TABLETS BY MOUTH ONCE A WEEK (Patient taking differently: Take 6 tablets by mouth 1 (One) Time Per Week. Taking 4 tablets once a week) 72 tablet 1    methylcellulose, Laxative, (CITRUCEL) 500 MG tablet tablet Take 2 tablets by mouth Daily.      methylPREDNISolone (MEDROL) 4 MG tablet Take 0.5 tablets by mouth Daily. with food      Multi-Vitamin tablet tablet Take 1 tablet by mouth Daily.      pantoprazole (PROTONIX) 40 MG EC tablet Take 1 tablet by mouth 2 (Two) Times a Day. 180 tablet 2    promethazine (PHENERGAN) 25 MG tablet Take 0.5 tablets by mouth Every 6 (Six) Hours As Needed for Nausea or Vomiting. 40 tablet 4    amLODIPine (NORVASC) 10 MG tablet TAKE 1 TABLET BY MOUTH EVERY DAY 90 tablet 1    PARoxetine (PAXIL) 10 MG tablet Take 0.5 tablets by mouth every night at bedtime. 45 tablet 1    rosuvastatin (CRESTOR) 5 MG tablet TAKE 1 TABLET BY MOUTH EVERY DAY 90 tablet 0     No facility-administered medications prior to visit.       No opioid medication identified on active medication list. I have reviewed chart for other potential  high risk medication/s and harmful drug interactions in the elderly.        Aspirin is not on active medication list.  Aspirin use is not indicated based on review of current medical condition/s. Risk of harm outweighs potential benefits.  .    Patient Active Problem List   Diagnosis    Pain in extremity    Gastroparesis    Anxiety    Degeneration of lumbar intervertebral disc    Annular tear of lumbar disc    Mixed hyperlipidemia    Rheumatoid arthritis involving multiple sites with positive rheumatoid factor    Fatty liver    Gastroesophageal reflux disease without esophagitis    Adenomatous polyp of colon    Nausea    Abnormal CT of the abdomen     Advance Care Planning   Advance Care Planning    "  Advance Directive is not on file.  ACP discussion was held with the patient during this visit. Patient has an advance directive (not in EMR), copy requested.     Objective    Vitals:    25 1321   BP: 108/60   Pulse: 81   Resp: 18   Temp: 97.5 °F (36.4 °C)   TempSrc: Temporal   SpO2: 98%   Weight: 62.7 kg (138 lb 3.2 oz)   Height: 160 cm (63\")   PainSc: 0-No pain     Estimated body mass index is 24.48 kg/m² as calculated from the following:    Height as of this encounter: 160 cm (63\").    Weight as of this encounter: 62.7 kg (138 lb 3.2 oz).    BMI is within normal parameters. No other follow-up for BMI required.      Does the patient have evidence of cognitive impairment? No    Lab Results   Component Value Date    CHLPL 152 2025    TRIG 64 2025    HDL 85 (H) 2025    LDL 54 2025    VLDL 13 2025        HEALTH RISK ASSESSMENT    Smoking Status:  Social History     Tobacco Use   Smoking Status Former    Current packs/day: 0.00    Average packs/day: 0.5 packs/day for 30.0 years (15.0 ttl pk-yrs)    Types: Cigarettes    Start date: 1979    Quit date: 2009    Years since quittin.5    Passive exposure: Never   Smokeless Tobacco Never     Alcohol Consumption:  Social History     Substance and Sexual Activity   Alcohol Use No     Fall Risk Screen:    BABS Fall Risk Assessment was completed, and patient is at LOW risk for falls.Assessment completed on:2025    Depression Screenin/16/2025     2:00 PM   PHQ-2/PHQ-9 Depression Screening   Little interest or pleasure in doing things Not at all   Feeling down, depressed, or hopeless Not at all   Trouble falling or staying asleep, or sleeping too much Not at all   Feeling tired or having little energy Not at all   Poor appetite or overeating Not at all   Feeling bad about yourself - or that you are a failure or have let yourself or your family down Not at all   Trouble concentrating on things, such as reading the " newspaper or watching television Not at all   Moving or speaking so slowly that other people could have noticed? Or the opposite - being so fidgety or restless that you have been moving around a lot more than usual. Not at all   Thoughts that you would be better off dead or hurting yourself in some way Not at all   Patient Health Questionnaire-9 Score 0   How difficult have these problems made it for you to do your work, take care of things at home, or get along with other people? Not difficult at all       Health Habits and Functional and Cognitive Screenin/9/2025    12:52 PM   Functional & Cognitive Status   Do you have difficulty preparing food and eating? No   Do you have difficulty bathing yourself, getting dressed or grooming yourself? No   Do you have difficulty using the toilet? No   Do you have difficulty moving around from place to place? No   Do you have trouble with steps or getting out of a bed or a chair? No   Current Diet Well Balanced Diet   Dental Exam Up to date   Eye Exam Up to date   Exercise (times per week) 3 times per week   Current Exercises Include Gardening;House Cleaning;Swimming;Walking;Yard Work   Do you need help using the phone?  No   Are you deaf or do you have serious difficulty hearing?  No   Do you need help to go to places out of walking distance? No   Do you need help shopping? No   Do you need help preparing meals?  No   Do you need help with housework?  No   Do you need help with laundry? No   Do you need help taking your medications? No   Do you need help managing money? No   Do you ever drive or ride in a car without wearing a seat belt? No   Have you felt unusual fatigue (could be tiredness), stress, anger or loneliness in the last month? No   Who do you live with? Spouse   If you need help, do you have trouble finding someone available to you? No   Have you been bothered in the last four weeks by sexual problems? No   Do you have difficulty concentrating,  remembering or making decisions? No       Age-appropriate Screening Schedule:  Refer to the list below for future screening recommendations based on patient's age, sex and/or medical conditions. Orders for these recommended tests are listed in the plan section. The patient has been provided with a written plan.    Health Maintenance   Topic Date Due    COVID-19 Vaccine (7 - 2024-25 season) 09/01/2024    DXA SCAN  07/05/2025    INFLUENZA VACCINE  10/01/2025    ANNUAL WELLNESS VISIT  07/16/2026    LIPID PANEL  07/21/2026    MAMMOGRAM  09/11/2026    COLORECTAL CANCER SCREENING  06/10/2027    TDAP/TD VACCINES (2 - Td or Tdap) 11/02/2027    HEPATITIS C SCREENING  Completed    Pneumococcal Vaccine 50+  Completed    ZOSTER VACCINE  Completed                  CMS Preventative Services Quick Reference  Risk Factors Identified During Encounter  None Identified  The above risks/problems have been discussed with the patient.  Pertinent information has been shared with the patient in the After Visit Summary.  An After Visit Summary and PPPS were made available to the patient.    Follow Up:   Next Medicare Wellness visit to be scheduled in 1 year.       Additional E&M Note during same encounter follows:  Patient has multiple medical problems which are significant and separately identifiable that require additional work above and beyond the Medicare Wellness Visit.      Chief Complaint  Medicare Wellness-subsequent    Subjective        HPI  Ritu Hanson is also being seen today for AWV, HTN, HLD, HM    History of Present Illness  The patient presents for evaluation of rheumatoid arthritis, cerumen impaction, and health maintenance.    She recently consulted with her rheumatologist and is awaiting her lab results. She has been on prednisone 2 mg for an extended period and was advised to discontinue it abruptly, which she is hesitant to do. She is considering reducing the dosage to every other day until she can stop completely.  Her Humira treatment has been disrupted due to paperwork issues, and she is concerned about the potential impact of discontinuing prednisone while not receiving Humira. She reports no pain and continues to take methotrexate as prescribed. She has noticed some bruising on her legs but reports no new skin issues. She also reports no headaches or dizziness.    She has not seen her cardiologist in several years and is unsure if she should schedule an appointment. She reports no chest pain, heart palpitations, unusual fatigue, or shortness of breath with exertion. She had a heart checkup at [facility] and was advised to undergo a heart workup as a precaution.    She reports no urinary issues and maintains good hydration. She reports no blood in her urine or stool, abnormal vaginal bleeding or discharge, pain during intercourse, or loss of libido. She has experienced some muscle aches and pains, which she attributes to her arthritis. She has arthritis in her hips. She reports no new skin issues, headaches, or dizziness. She does not require any medication refills at this time.    She has been experiencing some nausea, which she manages with ginger ale. She is not taking baby aspirin. She has an advanced directive and living will on file at [facility]. She reports no memory issues. She is up to date on all her female health screenings and has regular eye exams due to her steroid use. She reports no cataracts. She sees Dr. Pereira for dermatology and plans to schedule an appointment due to some dark spots on her skin. She is due for a bone density test this year. She is up to date on all her vaccines and colonoscopy. She reports no hearing issues and sleeps well. She reports no cough or shortness of breath.    She has a history of earwax buildup and uses peroxide and warm water for self-cleaning.    Social History:  Occupations: Works at [employer]  Hobbies: Organizing, puzzle games  Sleep: Reports good sleep    PAST  "SURGICAL HISTORY:   - Local removal of 11 lesions from hands at [facility]  - Heart workup at [facility]            Objective   Vital Signs:  /60   Pulse 81   Temp 97.5 °F (36.4 °C) (Temporal)   Resp 18   Ht 160 cm (63\")   Wt 62.7 kg (138 lb 3.2 oz)   SpO2 98%   BMI 24.48 kg/m²     Physical Exam  Vitals and nursing note reviewed.   Constitutional:       General: She is not in acute distress.     Appearance: She is well-developed. She is not ill-appearing.   HENT:      Head: Normocephalic and atraumatic.      Right Ear: Ear canal and external ear normal. There is impacted cerumen.      Left Ear: Ear canal and external ear normal. There is impacted cerumen.      Mouth/Throat:      Mouth: Mucous membranes are moist.      Pharynx: Uvula midline. No posterior oropharyngeal erythema.   Eyes:      General: No scleral icterus.        Right eye: No discharge.         Left eye: No discharge.      Conjunctiva/sclera: Conjunctivae normal.      Pupils: Pupils are equal, round, and reactive to light.   Neck:      Thyroid: No thyromegaly.   Cardiovascular:      Rate and Rhythm: Normal rate and regular rhythm.      Heart sounds: Normal heart sounds.   Pulmonary:      Effort: Pulmonary effort is normal.      Breath sounds: Normal breath sounds.   Abdominal:      General: Bowel sounds are normal. There is no distension.      Palpations: Abdomen is soft. There is no mass.      Tenderness: There is no abdominal tenderness. There is no guarding or rebound.      Hernia: No hernia is present.   Musculoskeletal:         General: No deformity.      Cervical back: Neck supple.      Comments: Gait smooth and steady  Enlarged joints and deformities secondary to RA   Lymphadenopathy:      Cervical: No cervical adenopathy.   Skin:     General: Skin is warm and dry.   Neurological:      General: No focal deficit present.      Mental Status: She is alert and oriented to person, place, and time.   Psychiatric:         Mood and " Affect: Mood normal.         Behavior: Behavior normal.         Thought Content: Thought content normal.         Physical Exam                  Results                Assessment and Plan   Diagnoses and all orders for this visit:    1. Encounter for annual wellness visit (AWV) in Medicare patient (Primary)    2. Essential hypertension  -     Microalbumin / Creatinine Urine Ratio - Urine, Clean Catch    3. Mixed hyperlipidemia  -     Lipid Panel With LDL / HDL Ratio    4. GIACOMO (generalized anxiety disorder)    5. Current chronic use of systemic steroids    6. Rheumatoid arthritis involving multiple sites with positive rheumatoid factor  -     predniSONE (DELTASONE) 1 MG tablet; Take 1 tablet by mouth Daily.  Dispense: 30 tablet; Refill: 0    7. Bilateral impacted cerumen  -     Ear Cerumen Removal        Assessment & Plan  1. Rheumatoid Arthritis.  - She has been on prednisone 2 mg (half a tablet of 4 mg) daily for an extended period. Her rheumatologist suggested discontinuing prednisone abruptly, but she is concerned about potential side effects.  - It is recommended to reduce the prednisone dosage gradually. A prescription for prednisone 1 mg tablets will be sent to the pharmacy.  - She should continue her current dose until she receives her Humira injection. Afterward, she can reduce the dose to 1 mg daily for a couple of weeks, then halve it to 0.5 mg daily for a week before discontinuing it completely.  - If she experiences any issues with prednisone, she should contact the office.    2. Cholesterol Management.  - Her cholesterol levels have not been checked since 05/2024.  - [facility]  - Once the results are available, her cholesterol medication will be adjusted as necessary.  - She will be provided with the lab order to ensure there is no issue with the test.    3. Cerumen Impaction.  - She has a significant amount of wax in both ears.  - Ear irrigation will be  performed today to remove the wax.  - She is advised to continue regular ear cleaning at home to prevent future buildup.    4. Health Maintenance.  - She is up to date on all her vaccines and screenings, including colonoscopy, which is due in 2027.  - She is advised to continue regular eye exams due to steroid use and follow up with dermatology for any concerning skin changes.  - She should ensure her bone density test is completed this year as scheduled.    5.  Hypertension-chronic-controlled  - Currently controlled-continue current amlodipine 10 mg  - Check urine micro    6.  GIACOMO-chronic-controlled  Continue current Paxil 10 mg    Cardiology follow-up.  - She has not seen her cardiologist in several years and is unsure if she should schedule an appointment.  - It is recommended to follow up with cardiology to ensure comprehensive management of her health, especially given her history of rheumatoid arthritis and steroid use.  She gets DEXA scans done at rheumatology            Follow Up   No follow-ups on file.  Patient was given instructions and counseling regarding her condition or for health maintenance advice. Please see specific information pulled into the AVS if appropriate.    Patient or patient representative verbalized consent for the use of Ambient Listening during the visit with  JUSTIN Smith for chart documentation. 7/26/2025  17:09 EDT

## 2025-07-16 NOTE — PROGRESS NOTES
Ear Cerumen Removal    Date/Time: 7/16/2025 4:12 PM    Performed by: Kiara Villanueva APRN  Authorized by: Kiara Villanueva APRN  Consent: Verbal consent obtained  Risks and benefits: risks, benefits and alternatives were discussed  Consent given by: patient  Patient understanding: patient states understanding of the procedure being performed  Patient consent: the patient's understanding of the procedure matches consent given  Patient identity confirmed: verbally with patient  Location details: left ear and right ear  Patient tolerance: patient tolerated the procedure well with no immediate complications  Procedure type: irrigation   Sedation:  Patient sedated: no

## 2025-07-22 LAB
ALBUMIN/CREAT UR: 42 MG/G CREAT (ref 0–29)
CHOLEST SERPL-MCNC: 152 MG/DL (ref 0–200)
CREAT UR-MCNC: 69 MG/DL
HDLC SERPL-MCNC: 85 MG/DL (ref 40–60)
LDLC SERPL CALC-MCNC: 54 MG/DL (ref 0–100)
LDLC/HDLC SERPL: 0.64 {RATIO}
MICROALBUMIN UR-MCNC: 29 UG/ML
TRIGL SERPL-MCNC: 64 MG/DL (ref 0–150)
VLDLC SERPL CALC-MCNC: 13 MG/DL (ref 5–40)

## 2025-07-23 ENCOUNTER — TELEPHONE (OUTPATIENT)
Dept: FAMILY MEDICINE CLINIC | Facility: CLINIC | Age: 70
End: 2025-07-23
Payer: MEDICARE

## 2025-07-23 DIAGNOSIS — F41.9 ANXIETY: ICD-10-CM

## 2025-07-23 DIAGNOSIS — I10 ESSENTIAL HYPERTENSION: ICD-10-CM

## 2025-07-23 RX ORDER — PAROXETINE 10 MG/1
5 TABLET, FILM COATED ORAL
Qty: 45 TABLET | Refills: 1 | Status: SHIPPED | OUTPATIENT
Start: 2025-07-23

## 2025-07-23 RX ORDER — ROSUVASTATIN CALCIUM 5 MG/1
5 TABLET, COATED ORAL DAILY
Qty: 90 TABLET | Refills: 0 | Status: SHIPPED | OUTPATIENT
Start: 2025-07-23

## 2025-07-23 RX ORDER — AMLODIPINE BESYLATE 10 MG/1
10 TABLET ORAL DAILY
Qty: 90 TABLET | Refills: 1 | Status: SHIPPED | OUTPATIENT
Start: 2025-07-23

## 2025-07-23 NOTE — TELEPHONE ENCOUNTER
"Relay     \"Total cholesterol and triglycerides remain good-total cholesterol has actually dropped by 20 points  HDL remains nice and high-above 60 is goal  LDL at goal  You have some protein in your urine-would discuss with rheumatologist to make sure not caused by any of your medications-SW\"  "

## 2025-07-23 NOTE — TELEPHONE ENCOUNTER
Name: Ritu Hanson      Relationship: Self      Best Callback Number: [phone]       HUB PROVIDED THE RELAY MESSAGE FROM THE OFFICE      PATIENT: VOICED UNDERSTANDING AND HAS NO FURTHER QUESTIONS AT THIS TIME    ADDITIONAL INFORMATION: PATIENT WILL FOLLOW UP WITH HER RHEUMATOLOGIST.  SHE ALSO ADVISED SHE HAD HAD OTHER LAB WORK DONE AND HAS SENT A COPY OF THOSE RESULTS VIA POSTAL MAIL

## 2025-08-11 ENCOUNTER — OFFICE VISIT (OUTPATIENT)
Dept: FAMILY MEDICINE CLINIC | Facility: CLINIC | Age: 70
End: 2025-08-11
Payer: MEDICARE

## 2025-08-11 VITALS
OXYGEN SATURATION: 99 % | DIASTOLIC BLOOD PRESSURE: 62 MMHG | HEART RATE: 91 BPM | SYSTOLIC BLOOD PRESSURE: 110 MMHG | BODY MASS INDEX: 24.98 KG/M2 | HEIGHT: 63 IN | WEIGHT: 141 LBS

## 2025-08-11 DIAGNOSIS — T88.7XXA MEDICATION SIDE EFFECT: ICD-10-CM

## 2025-08-11 DIAGNOSIS — I10 ESSENTIAL HYPERTENSION: ICD-10-CM

## 2025-08-11 DIAGNOSIS — M79.89 LEG SWELLING: Primary | ICD-10-CM

## 2025-08-11 PROCEDURE — 1126F AMNT PAIN NOTED NONE PRSNT: CPT | Performed by: NURSE PRACTITIONER

## 2025-08-11 PROCEDURE — 1159F MED LIST DOCD IN RCRD: CPT | Performed by: NURSE PRACTITIONER

## 2025-08-11 PROCEDURE — 99214 OFFICE O/P EST MOD 30 MIN: CPT | Performed by: NURSE PRACTITIONER

## 2025-08-11 PROCEDURE — 1160F RVW MEDS BY RX/DR IN RCRD: CPT | Performed by: NURSE PRACTITIONER

## (undated) DEVICE — CANNULA,OXY,ADULT,SUPER SOFT,W/14'TUB,UC: Brand: MEDLINE INDUSTRIES, INC.

## (undated) DEVICE — KT ORCA ORCAPOD DISP STRL

## (undated) DEVICE — BLCK/BITE BLOX W/DENTL/RIM W/STRAP 54F

## (undated) DEVICE — SENSR O2 OXIMAX FNGR A/ 18IN NONSTR

## (undated) DEVICE — LN SMPL CO2 SHTRM SD STREAM W/M LUER

## (undated) DEVICE — BITEBLOCK OMNI BLOC

## (undated) DEVICE — FRCP BX RADJAW4 NDL 2.8 240CM LG OG BX40

## (undated) DEVICE — ADAPT CLN BIOGUARD AIR/H2O DISP

## (undated) DEVICE — TUBING, SUCTION, 1/4" X 10', STRAIGHT: Brand: MEDLINE

## (undated) DEVICE — CANN O2 ETCO2 FITS ALL CONN CO2 SMPL A/ 7IN DISP LF

## (undated) DEVICE — MSK PROC CURAPLEX O2 2/ADAPT 7FT